# Patient Record
Sex: MALE | Race: BLACK OR AFRICAN AMERICAN | Employment: UNEMPLOYED | URBAN - METROPOLITAN AREA
[De-identification: names, ages, dates, MRNs, and addresses within clinical notes are randomized per-mention and may not be internally consistent; named-entity substitution may affect disease eponyms.]

---

## 2018-06-06 ENCOUNTER — OFFICE VISIT (OUTPATIENT)
Dept: FAMILY MEDICINE CLINIC | Age: 38
End: 2018-06-06

## 2018-06-06 VITALS
WEIGHT: 226.4 LBS | SYSTOLIC BLOOD PRESSURE: 120 MMHG | HEART RATE: 90 BPM | OXYGEN SATURATION: 95 % | TEMPERATURE: 98.5 F | RESPIRATION RATE: 12 BRPM | HEIGHT: 69 IN | DIASTOLIC BLOOD PRESSURE: 90 MMHG | BODY MASS INDEX: 33.53 KG/M2

## 2018-06-06 DIAGNOSIS — R81 GLYCOSURIA: ICD-10-CM

## 2018-06-06 DIAGNOSIS — H92.01 RIGHT EAR PAIN: ICD-10-CM

## 2018-06-06 DIAGNOSIS — R03.0 ELEVATED BLOOD PRESSURE READING: Primary | ICD-10-CM

## 2018-06-06 RX ORDER — METFORMIN HYDROCHLORIDE 1000 MG/1
TABLET ORAL
Refills: 0 | COMMUNITY
Start: 2018-05-19 | End: 2018-06-25 | Stop reason: SINTOL

## 2018-06-06 NOTE — MR AVS SNAPSHOT
[address] 
[phone] Patient: Francisca Rey MRN: [id number] OKS:[id number]/[id number] Visit Information Date & Time Provider Department Dept. Phone Encounter #  
 6/6/2018  1:30 PM Anju Bahena, [address] [place] [address] [id number] Follow-up Instructions Return in about 1 week (around 6/13/2018) for Result review. Upcoming Health Maintenance Date Due DTaP/Tdap/Td series (1 - Tdap) 8/23/2000 Influenza Age 5 to Adult 8/1/2018 Allergies as of 6/6/2018  Review Complete On: 6/6/2018 By: CRUZ West No Known Allergies Current Immunizations  Never Reviewed No immunizations on file. Not reviewed this visit You Were Diagnosed With   
  
 Codes Comments Elevated blood pressure reading    -  Primary ICD-10-CM: R03.0 ICD-9-CM: 796.2 Glycosuria     ICD-10-CM: R81 
ICD-9-CM: 791.5 Right ear pain     ICD-10-CM: H92.01 
ICD-9-CM: 388.70 BMI 33.0-33.9,adult     ICD-10-CM: Y23.69 
ICD-9-CM: V85.33 Vitals BP Pulse Temp Resp Height(growth percentile) Weight(growth percentile) 120/90 (BP 1 Location: Right arm, BP Patient Position: Sitting) 90 98.5 °F (36.9 °C) (Oral) 12 5' 9\" (1.753 m) 226 lb 6.4 oz (102.7 kg) SpO2 BMI Smoking Status 95% 33.43 kg/m2 Never Smoker Vitals History BMI and BSA Data Body Mass Index Body Surface Area  
 33.43 kg/m 2 2.24 m 2 Preferred Pharmacy Pharmacy Name Phone [address], [address] [place] [address] [phone] Your Updated Medication List  
  
   
This list is accurate as of 6/6/18  2:27 PM.  Always use your most recent med list.  
  
  
  
  
 metFORMIN 1,000 mg tablet Commonly known as:  GLUCOPHAGE  
TAKE 1 TABLET BY MOUTH 2 TIMES A DAY Follow-up Instructions Return in about 1 week (around 6/13/2018) for Result review. To-Do List   
 06/06/2018 Lab:  HEMOGLOBIN A1C W/O EAG   
  
 06/06/2018 Lab:  METABOLIC PANEL, COMPREHENSIVE Around 06/07/2018 Lab:  LIPID PANEL Patient Instructions A Healthy Lifestyle: Care Instructions Your Care Instructions A healthy lifestyle can help you feel good, stay at a healthy weight, and have plenty of energy for both work and play. A healthy lifestyle is something you can share with your whole family. A healthy lifestyle also can lower your risk for serious health problems, such as high blood pressure, heart disease, and diabetes. You can follow a few steps listed below to improve your health and the health of your family. Follow-up care is a key part of your treatment and safety. Be sure to make and go to all appointments, and call your doctor if you are having problems. It's also a good idea to know your test results and keep a list of the medicines you take. How can you care for yourself at home? · Do not eat too much sugar, fat, or fast foods. You can still have dessert and treats now and then. The goal is moderation. · Start small to improve your eating habits. Pay attention to portion sizes, drink less juice and soda pop, and eat more fruits and vegetables. ¨ Eat a healthy amount of food. A 3-ounce serving of meat, for example, is about the size of a deck of cards. Fill the rest of your plate with vegetables and whole grains. ¨ Limit the amount of soda and sports drinks you have every day. Drink more water when you are thirsty. ¨ Eat at least 5 servings of fruits and vegetables every day. It may seem like a lot, but it is not hard to reach this goal. A serving or helping is 1 piece of fruit, 1 cup of vegetables, or 2 cups of leafy, raw vegetables. Have an apple or some carrot sticks as an afternoon snack instead of a candy bar.  Try to have fruits and/or vegetables at every meal. 
 · Make exercise part of your daily routine. You may want to start with simple activities, such as walking, bicycling, or slow swimming. Try to be active 30 to 60 minutes every day. You do not need to do all 30 to 60 minutes all at once. For example, you can exercise 3 times a day for 10 or 20 minutes. Moderate exercise is safe for most people, but it is always a good idea to talk to your doctor before starting an exercise program. 
· Keep moving. Normarycaremnn Feeler the lawn, work in the garden, or [facility]. Take the stairs instead of the elevator at work. · If you smoke, quit. People who smoke have an increased risk for heart attack, stroke, cancer, and other lung illnesses. Quitting is hard, but there are ways to boost your chance of quitting tobacco for good. ¨ Use nicotine gum, patches, or lozenges. ¨ Ask your doctor about stop-smoking programs and medicines. ¨ Keep trying. In addition to reducing your risk of diseases in the future, you will notice some benefits soon after you stop using tobacco. If you have shortness of breath or asthma symptoms, they will likely get better within a few weeks after you quit. · Limit how much alcohol you drink. Moderate amounts of alcohol (up to 2 drinks a day for men, 1 drink a day for women) are okay. But drinking too much can lead to liver problems, high blood pressure, and other health problems. Family health If you have a family, there are many things you can do together to improve your health. · Eat meals together as a family as often as possible. · Eat healthy foods. This includes fruits, vegetables, lean meats and dairy, and whole grains. · Include your family in your fitness plan. Most people think of activities such as jogging or tennis as the way to fitness, but there are many ways you and your family can be more active. Anything that makes you breathe hard and gets your heart pumping is exercise. Here are some tips: ¨ Walk to do errands or to take your child to school or the bus. ¨ Go for a family bike ride after dinner instead of watching TV. Where can you learn more? Go to http://chelsie-karin.info/. Enter N681 in the search box to learn more about \"A Healthy Lifestyle: Care Instructions. \" Current as of: May 12, 2017 Content Version: 11.4 © 9592-9521 Searchdaim[employer]. Care instructions adapted under license by "[employer] (which disclaims liability or warranty for this information). If you have questions about a medical condition or this instruction, always ask your healthcare professional. [address] any warranty or liability for your use of this information. Introducing [facility]! Ana Lilia Cunha introduces [employer] patient portal. Now you can access parts of your medical record, email your doctor's office, and request medication refills online. 1. In your internet browser, go to [email or web address] 2. Click on the First Time User? Click Here link in the Sign In box. You will see the New Member Sign Up page. 3. Enter your [employer] Access Code exactly as it appears below. You will not need to use this code after youve completed the sign-up process. If you do not sign up before the expiration date, you must request a new code. · [employer] Access Code: [email or web address] Expires: 9/4/2018  2:27 PM 
 
4. Enter the last four digits of your Social Security Number (xxxx) and Date of Birth (mm/dd/yyyy) as indicated and click Submit. You will be taken to the next sign-up page. 5. Create a [employer] ID. This will be your [employer] login ID and cannot be changed, so think of one that is secure and easy to remember. 6. Create a [employer] password. You can change your password at any time. 7. Enter your Password Reset Question and Answer. This can be used at a later time if you forget your password. 8. Enter your e-mail address. You will receive e-mail notification when new information is available in [address] 9. Click Sign Up. You can now view and download portions of your medical record. 10. Click the Download Summary menu link to download a portable copy of your medical information. If you have questions, please visit the Frequently Asked Questions section of the [employer] website. Remember, [employer] is NOT to be used for urgent needs. For medical emergencies, dial 911. Now available from your iPhone and Android! Please provide this summary of care documentation to your next provider. Your primary care clinician is listed as Donald Mc. If you have any questions after today's visit, please call [phone].

## 2018-06-06 NOTE — PATIENT INSTRUCTIONS

## 2018-06-06 NOTE — PROGRESS NOTES
Patient: Gunjan Amanda MRN: [id number]  SSN: [id number]    YOB: 1980  Age: 44 y.o. Sex: male      Date of Service: 6/6/2018   Provider: CRUZ Gambino   Office Location:   [address] Vince le, [address]  Office Phone: [phone]  Office Fax: [phone]        REASON FOR VISIT:   No chief complaint on file. VITALS:   There were no vitals taken for this visit. MEDICATIONS:        ALLERGIES:   Allergies not on file     MEDICAL/SURGICAL HISTORY:  No past medical history on file. No past surgical history on file. FAMILY HISTORY:  No family history on file. SOCIAL HISTORY:  Social History   Substance Use Topics    Smoking status: Not on file    Smokeless tobacco: Not on file    Alcohol use Not on file          HISTORY OF PRESENT ILLNESS:   Gunjan Amanda is a 44 y.o. male who presents to the office to establish care as a new patient. Here today to discuss diagnosis of possible Type 2 Diabetes. Patient reports he has not had routine primary care in several years. He went to a local urgent care a few weeks ago with complaints of right ear pain, and was found to have an ear infection. Somehow in the course of this visit, he had a urine test, which he states tested positive for diabetes. He was started on metformin 500 mg BID and then increased to 1,000 mg BID. He states the medication has been causing diarrhea. He has started checking his blood glucose at home. It seems to generally be in the 60s fasting, and around 120 after he eats. He stopped the metformin because he thought these glucose readings were low. He is unsure exactly how to proceed. Patient does have some risk factors for diabetes, including his weight and a family history in his father.      REVIEW OF SYSTEMS:  ROS (sees scanned H&P form for complete negative 12 point ROS)     PHYSICAL EXAMINATION:  Physical Exam   Constitutional: He is oriented to person, place, and time and well-developed, well-nourished, and in no distress. HENT:   Head: Normocephalic and atraumatic. Right Ear: Tympanic membrane normal.   Left Ear: Tympanic membrane normal.   Nose: Nose normal.   Mouth/Throat: Uvula is midline, oropharynx is clear and moist and mucous membranes are normal.   Eyes: Conjunctivae are normal. Pupils are equal, round, and reactive to light. Right eye exhibits no discharge. Left eye exhibits no discharge. Neck: Neck supple. No thyromegaly present. Cardiovascular: Normal rate, regular rhythm and normal heart sounds. Exam reveals no gallop and no friction rub. No murmur heard. Pulmonary/Chest: Effort normal and breath sounds normal. He has no wheezes. He has no rales. Lymphadenopathy:     He has no cervical adenopathy. Neurological: He is alert and oriented to person, place, and time. Gait normal.   Skin: Skin is warm and dry. No rash noted. Psychiatric: Mood, memory and affect normal.        RESULTS:  No results found for this visit on 06/06/18. ASSESSMENT/PLAN:  Diagnoses and all orders for this visit:    1. Elevated blood pressure reading  - BP is mildly elevated today, and from the sounds of it, was elevated at recent urgent care visits.   - Likely we are looking at a new diagnosis of mild hypertension, but I'd like to obtain the urgent care records and have him get fasting labs done before discussing medication  Orders:    -     METABOLIC PANEL, COMPREHENSIVE; Future  -     LIPID PANEL; Future    2. Glycosuria   - Based on what patient is telling me, he was given a presumptive diagnosis of diabetes based on the results of a urinalysis done at urgent care. - His reported home glucose readings do not sound particularly consistent with this diagnosis. - I explained that I would like to obtain the test results from the urgent care and have him complete fasting labs as below.    - He will return once labs are done for reassessment. May hold metformin until then  Orders:    -     METABOLIC PANEL, COMPREHENSIVE; Future  -     HEMOGLOBIN A1C W/O EAG; Future    3. Right ear pain  - Resolved. 4. BMI 33.0-33.9,adult  - Healthy lifestyle handout included in AVS   - Check labs  Orders:    -     LIPID PANEL; Future  -     HEMOGLOBIN A1C W/O EAG; Future        Follow-up Disposition:  Return in about 1 week (around 6/13/2018) for Result review.        PATIENT CARE TEAM:   No care team member to display       CRUZ Guzmán   June 6, 2018    2:44 PM

## 2018-06-06 NOTE — PROGRESS NOTES
Chief Complaint   Patient presents with    Establish Care    Other     Diabetes Concern    Hypertension     Visit Vitals    /90 (BP 1 Location: Right arm, BP Patient Position: Sitting)    Pulse 90    Temp 98.5 °F (36.9 °C) (Oral)    Resp 12    Ht 5' 9\" (1.753 m)    Wt 226 lb 6.4 oz (102.7 kg)    SpO2 95%    BMI 33.43 kg/m2       Patient is not fasting. Patient in room # 6.     1. Have you been to the ER, urgent care clinic since your last visit? Hospitalized since your last visit? Yes When: 05/29/2018 [facility] [place] [facility] [address] [facility] Reason for visit: Ear pain    2. Have you seen or consulted any other health care providers outside of the [facility] since your last visit? Include any pap smears or colon screening. Yes When: 05/2018 Where: ENT, [address] [facility], [place], [address] [facility] Reason for visit: Ear pain follow-up    HM Reviewed. Flowsheet, PHQ and Learning needs completed.     Upcoming Appt. 06/26/2018, ENT on [address], [address]

## 2018-06-08 ENCOUNTER — HOSPITAL ENCOUNTER (OUTPATIENT)
Dept: LAB | Age: 38
Discharge: HOME | End: 2018-06-08
Payer: COMMERCIAL

## 2018-06-08 DIAGNOSIS — R81 GLYCOSURIA: ICD-10-CM

## 2018-06-08 DIAGNOSIS — R03.0 ELEVATED BLOOD PRESSURE READING: ICD-10-CM

## 2018-06-08 LAB
ALBUMIN SERPL-MCNC: 4 G/DL (ref 3.4–5)
ALBUMIN/GLOB SERPL: 1.2 {RATIO} (ref 0.8–1.7)
ALP SERPL-CCNC: 78 U/L (ref 45–117)
ALT SERPL-CCNC: 42 U/L (ref 16–61)
ANION GAP SERPL CALC-SCNC: 5 MMOL/L (ref 3–18)
AST SERPL-CCNC: 13 U/L (ref 15–37)
BILIRUB SERPL-MCNC: 0.3 MG/DL (ref 0.2–1)
BUN SERPL-MCNC: 15 MG/DL (ref 7–18)
BUN/CREAT SERPL: 13 (ref 12–20)
CALCIUM SERPL-MCNC: 9.2 MG/DL (ref 8.5–10.1)
CHLORIDE SERPL-SCNC: 104 MMOL/L (ref 100–108)
CHOLEST SERPL-MCNC: 257 MG/DL
CO2 SERPL-SCNC: 30 MMOL/L (ref 21–32)
CREAT SERPL-MCNC: 1.2 MG/DL (ref 0.6–1.3)
GLOBULIN SER CALC-MCNC: 3.4 G/DL (ref 2–4)
GLUCOSE SERPL-MCNC: 214 MG/DL (ref 74–99)
HBA1C MFR BLD: 7.6 % (ref 4.2–5.6)
HDLC SERPL-MCNC: 32 MG/DL (ref 40–60)
HDLC SERPL: 8 {RATIO} (ref 0–5)
LDLC SERPL CALC-MCNC: 175 MG/DL (ref 0–100)
LIPID PROFILE,FLP: ABNORMAL
POTASSIUM SERPL-SCNC: 4.2 MMOL/L (ref 3.5–5.5)
PROT SERPL-MCNC: 7.4 G/DL (ref 6.4–8.2)
SODIUM SERPL-SCNC: 139 MMOL/L (ref 136–145)
TRIGL SERPL-MCNC: 250 MG/DL (ref ?–150)
VLDLC SERPL CALC-MCNC: 50 MG/DL

## 2018-06-08 PROCEDURE — 80053 COMPREHEN METABOLIC PANEL: CPT | Performed by: PHYSICIAN ASSISTANT

## 2018-06-08 PROCEDURE — 83036 HEMOGLOBIN GLYCOSYLATED A1C: CPT | Performed by: PHYSICIAN ASSISTANT

## 2018-06-08 PROCEDURE — 80061 LIPID PANEL: CPT | Performed by: PHYSICIAN ASSISTANT

## 2018-06-08 PROCEDURE — 36415 COLL VENOUS BLD VENIPUNCTURE: CPT | Performed by: PHYSICIAN ASSISTANT

## 2018-06-14 ENCOUNTER — OFFICE VISIT (OUTPATIENT)
Dept: FAMILY MEDICINE CLINIC | Age: 38
End: 2018-06-14

## 2018-06-14 VITALS
DIASTOLIC BLOOD PRESSURE: 78 MMHG | HEART RATE: 96 BPM | WEIGHT: 222.4 LBS | RESPIRATION RATE: 12 BRPM | HEIGHT: 69 IN | TEMPERATURE: 99.2 F | BODY MASS INDEX: 32.94 KG/M2 | SYSTOLIC BLOOD PRESSURE: 102 MMHG | OXYGEN SATURATION: 95 %

## 2018-06-14 DIAGNOSIS — E78.2 MIXED HYPERLIPIDEMIA: ICD-10-CM

## 2018-06-14 DIAGNOSIS — E11.9 TYPE 2 DIABETES MELLITUS WITHOUT COMPLICATION, WITHOUT LONG-TERM CURRENT USE OF INSULIN (HCC): Primary | ICD-10-CM

## 2018-06-14 RX ORDER — INSULIN PUMP SYRINGE, 3 ML
EACH MISCELLANEOUS
Qty: 1 KIT | Refills: 0 | Status: SHIPPED | OUTPATIENT
Start: 2018-06-14

## 2018-06-14 RX ORDER — KETOCONAZOLE 20 MG/G
CREAM TOPICAL
COMMUNITY
Start: 2018-03-19 | End: 2018-07-23

## 2018-06-14 RX ORDER — LANCETS
EACH MISCELLANEOUS
Qty: 1 EACH | Refills: 11 | Status: SHIPPED | OUTPATIENT
Start: 2018-06-14

## 2018-06-14 RX ORDER — KETOCONAZOLE 20 MG/ML
SHAMPOO TOPICAL
COMMUNITY
Start: 2018-03-19 | End: 2018-07-02 | Stop reason: SDUPTHER

## 2018-06-14 RX ORDER — SIMVASTATIN 10 MG/1
10 TABLET, FILM COATED ORAL
Qty: 30 TAB | Refills: 0 | Status: SHIPPED | OUTPATIENT
Start: 2018-06-14 | End: 2018-11-05 | Stop reason: SDUPTHER

## 2018-06-14 NOTE — MR AVS SNAPSHOT
[address] [place] [address] [address] [facility] 
[phone] Patient: Alem Nolen MRN: [id number] BWM:[id number]/60/[id number] Visit Information Date & Time Provider Department Dept. Phone Encounter #  
 6/14/2018  2:30 PM Billie Shore, [address] [place] [address] [id number] Follow-up Instructions Return in about 1 month (around 7/14/2018) for DM. Upcoming Health Maintenance Date Due DTaP/Tdap/Td series (1 - Tdap) 8/23/2000 Influenza Age 5 to Adult 8/1/2018 Allergies as of 6/14/2018  Review Complete On: 6/14/2018 By: Emily Tomas LPN No Known Allergies Current Immunizations  Never Reviewed No immunizations on file. Not reviewed this visit You Were Diagnosed With   
  
 Codes Comments Type 2 diabetes mellitus without complication, without long-term current use of insulin (HCC)    -  Primary ICD-10-CM: E11.9 ICD-9-CM: 250.00 Mixed hyperlipidemia     ICD-10-CM: E78.2 ICD-9-CM: 272.2 BMI 32.0-32.9,adult     ICD-10-CM: T98.99 
ICD-9-CM: V85.32 Vitals BP Pulse Temp Resp Height(growth percentile) Weight(growth percentile) 102/78 (BP 1 Location: Right arm, BP Patient Position: Sitting) 96 99.2 °F (37.3 °C) (Oral) 12 5' 9\" (1.753 m) 222 lb 6.4 oz (100.9 kg) SpO2 BMI Smoking Status 95% 32.84 kg/m2 Never Smoker Vitals History BMI and BSA Data Body Mass Index Body Surface Area  
 32.84 kg/m 2 2.22 m 2 Preferred Pharmacy Pharmacy Name Phone [address], [address] [place] [address] [phone] Your Updated Medication List  
  
   
This list is accurate as of 6/14/18  3:18 PM.  Always use your most recent med list.  
  
  
  
  
 Blood-Glucose Meter monitoring kit Check blood glucose once daily in the morning Dx: E11.9  
  
 glucose blood VI test strips strip Commonly known as:  blood glucose test  
Check blood glucose once daily in the morning Dx: E11.9  
  
 * ketoconazole 2 % shampoo Commonly known as:  NIZORAL * ketoconazole 2 % topical cream  
Commonly known as:  NIZORAL Lancets Misc Check blood glucose once daily in the morning Dx: E11.9  
  
 metFORMIN 1,000 mg tablet Commonly known as:  GLUCOPHAGE  
TAKE 1 TABLET BY MOUTH 2 TIMES A DAY  
  
 simvastatin 10 mg tablet Commonly known as:  ZOCOR Take 1 Tab by mouth nightly. * Notice: This list has 2 medication(s) that are the same as other medications prescribed for you. Read the directions carefully, and ask your doctor or other care provider to review them with you. Prescriptions Printed Refills Blood-Glucose Meter monitoring kit 0 Sig: Check blood glucose once daily in the morning Dx: E11.9 Class: Print Lancets misc 11 Sig: Check blood glucose once daily in the morning Dx: E11.9 Class: Print  
 glucose blood VI test strips (BLOOD GLUCOSE TEST) strip 11 Sig: Check blood glucose once daily in the morning Dx: E11.9 Class: Print Prescriptions Sent to Pharmacy Refills  
 simvastatin (ZOCOR) 10 mg tablet 0 Sig: Take 1 Tab by mouth nightly. Class: Normal  
 Pharmacy: [id number], [address] [facility] #: [phone] Route: Oral  
  
Follow-up Instructions Return in about 1 month (around 7/14/2018) for DM. Patient Instructions Learning About Diabetes Food Guidelines Your Care Instructions Meal planning is important to manage diabetes. It helps keep your blood sugar at a target level (which you set with your doctor). You don't have to eat special foods. You can eat what your family eats, including sweets once in a while. But you do have to pay attention to how often you eat and how much you eat of certain foods. You may want to work with a dietitian or a certified diabetes educator (CDE) to help you plan meals and snacks. A dietitian or CDE can also help you lose weight if that is one of your goals. What should you know about eating carbs? Managing the amount of carbohydrate (carbs) you eat is an important part of healthy meals when you have diabetes. Carbohydrate is found in many foods. · Learn which foods have carbs. And learn the amounts of carbs in different foods. ¨ Bread, cereal, pasta, and rice have about 15 grams of carbs in a serving. A serving is 1 slice of bread (1 ounce), ½ cup of cooked cereal, or 1/3 cup of cooked pasta or rice. ¨ Fruits have 15 grams of carbs in a serving. A serving is 1 small fresh fruit, such as an apple or orange; ½ of a banana; ½ cup of cooked or canned fruit; ½ cup of fruit juice; 1 cup of melon or raspberries; or 2 tablespoons of dried fruit. ¨ Milk and no-sugar-added yogurt have 15 grams of carbs in a serving. A serving is 1 cup of milk or 2/3 cup of no-sugar-added yogurt. ¨ Starchy vegetables have 15 grams of carbs in a serving. A serving is ½ cup of mashed potatoes or sweet potato; 1 cup winter squash; ½ of a small baked potato; ½ cup of cooked beans; or ½ cup cooked corn or green peas. · Learn how much carbs to eat each day and at each meal. A dietitian or CDE can teach you how to keep track of the amount of carbs you eat. This is called carbohydrate counting. · If you are not sure how to count carbohydrate grams, use the Plate Method to plan meals. It is a good, quick way to make sure that you have a balanced meal. It also helps you spread carbs throughout the day. ¨ Divide your plate by types of foods. Put non-starchy vegetables on half the plate, meat or other protein food on one-quarter of the plate, and a grain or starchy vegetable in the final quarter of the plate.  To this you can add a small piece of fruit and 1 cup of milk or yogurt, depending on how many carbs you are supposed to eat at a meal. 
· Try to eat about the same amount of carbs at each meal. Do not \"save up\" your daily allowance of carbs to eat at one meal. 
· Proteins have very little or no carbs per serving. Examples of proteins are beef, chicken, turkey, fish, eggs, tofu, cheese, cottage cheese, and peanut butter. A serving size of meat is 3 ounces, which is about the size of a deck of cards. Examples of meat substitute serving sizes (equal to 1 ounce of meat) are 1/4 cup of cottage cheese, 1 egg, 1 tablespoon of peanut butter, and ½ cup of tofu. How can you eat out and still eat healthy? · Learn to estimate the serving sizes of foods that have carbohydrate. If you measure food at home, it will be easier to estimate the amount in a serving of restaurant food. · If the meal you order has too much carbohydrate (such as potatoes, corn, or baked beans), ask to have a low-carbohydrate food instead. Ask for a salad or green vegetables. · If you use insulin, check your blood sugar before and after eating out to help you plan how much to eat in the future. · If you eat more carbohydrate at a meal than you had planned, take a walk or do other exercise. This will help lower your blood sugar. What else should you know? · Limit saturated fat, such as the fat from meat and dairy products. This is a healthy choice because people who have diabetes are at higher risk of heart disease. So choose lean cuts of meat and nonfat or low-fat dairy products. Use olive or canola oil instead of butter or shortening when cooking. · Don't skip meals. Your blood sugar may drop too low if you skip meals and take insulin or certain medicines for diabetes. · Check with your doctor before you drink alcohol. Alcohol can cause your blood sugar to drop too low. Alcohol can also cause a bad reaction if you take certain diabetes medicines. Follow-up care is a key part of your treatment and safety.  Be sure to make and go to all appointments, and call your doctor if you are having problems. It's also a good idea to know your test results and keep a list of the medicines you take. Where can you learn more? Go to http://chelsie-karin.info/. Enter L925 in the search box to learn more about \"Learning About Diabetes Food Guidelines. \" Current as of: March 13, 2017 Content Version: 11.4 © 3733-4937 Wormhole. Care instructions adapted under license by [employer] (which disclaims liability or warranty for this information). If you have questions about a medical condition or this instruction, always ask your healthcare professional. [address] any warranty or liability for your use of this information. Introducing hospitals & HEALTH SERVICES! Monique Delgado introduces [employer] patient portal. Now you can access parts of your medical record, email your doctor's office, and request medication refills online. 1. In your internet browser, go to [email or web address] 2. Click on the First Time User? Click Here link in the Sign In box. You will see the New Member Sign Up page. 3. Enter your [employer] Access Code exactly as it appears below. You will not need to use this code after youve completed the sign-up process. If you do not sign up before the expiration date, you must request a new code. · [employer] Access Code: [email or web address] Expires: 9/4/2018  2:27 PM 
 
4. Enter the last four digits of your Social Security Number (xxxx) and Date of Birth (mm/dd/yyyy) as indicated and click Submit. You will be taken to the next sign-up page. 5. Create a [employer] ID. This will be your [employer] login ID and cannot be changed, so think of one that is secure and easy to remember. 6. Create a [employer] password. You can change your password at any time. 7. Enter your Password Reset Question and Answer.  This can be used at a later time if you forget your password. 8. Enter your e-mail address. You will receive e-mail notification when new information is available in [address] 9. Click Sign Up. You can now view and download portions of your medical record. 10. Click the Download Summary menu link to download a portable copy of your medical information. If you have questions, please visit the Frequently Asked Questions section of the [employer] website. Remember, [employer] is NOT to be used for urgent needs. For medical emergencies, dial 911. Now available from your iPhone and Android! Please provide this summary of care documentation to your next provider. Your primary care clinician is listed as Roxana Barbosa. If you have any questions after today's visit, please call [phone].

## 2018-06-14 NOTE — PROGRESS NOTES
Patient: Amanda Calle MRN: [id number]  SSN: [id number]    YOB: 1980  Age: 44 y.o. Sex: male      Date of Service: 6/14/2018   Provider: CRUZ Hoover   Office Location:   [address] [place], [address] [place] [address] Dr Vince le, [address]  Office Phone: [phone]  Office Fax: [phone]      REASON FOR VISIT:   Chief Complaint   Patient presents with    Elevated Blood Pressure     follow-up    Results    Leg Pain     3-4 days, left leg        VITALS:   Visit Vitals    /78 (BP 1 Location: Right arm, BP Patient Position: Sitting)  Comment: manual    Pulse 96    Temp 99.2 °F (37.3 °C) (Oral)    Resp 12    Ht 5' 9\" (1.753 m)    Wt 222 lb 6.4 oz (100.9 kg)    SpO2 95%    BMI 32.84 kg/m2        MEDICATIONS:   Current Outpatient Prescriptions on File Prior to Visit   Medication Sig Dispense Refill    metFORMIN (GLUCOPHAGE) 1,000 mg tablet TAKE 1 TABLET BY MOUTH 2 TIMES A DAY  0     No current facility-administered medications on file prior to visit. ALLERGIES:   No Known Allergies     MEDICAL/SURGICAL HISTORY:  No past medical history on file. No past surgical history on file. FAMILY HISTORY:  Family History   Problem Relation Age of Onset    Hypertension Mother     High Cholesterol Mother     Diabetes Father     Hypertension Father     High Cholesterol Father     Cancer Mother         SOCIAL HISTORY:  Social History   Substance Use Topics    Smoking status: Never Smoker    Smokeless tobacco: Never Used    Alcohol use 1.8 oz/week     3 Cans of beer per week      Comment: rarely             HISTORY OF PRESENT ILLNESS: Amanda Calle is a 44 y.o. male who presents to the office for a routine follow up visit. Patient is here today to review recent labs. He had been given a diagnosis of Type 2 diabetes by a local urgent care, but we had been skeptical of this as he stated he never had any blood tests, only urine studies.  At his visit with me last week, a fasting metabolic & lipid panel as well as A1c were ordered. A1c was found to be 7.6%, confirming new diagnosis of Type 2 DM. I have since [facility], and it appears patient had an A1c done there as well, which was 7.2%     He is here today to discuss these results. Upon review of his diet, he admits he is a very unhealthy eater. Usually will have a sausage egg & cheese on a biscuit for breakfast, a chicken breast for lunch, and a meat lovers pizza for dinner. He does not really care for vegetables. He also drinks mostly soda. REVIEW OF SYSTEMS:  Review of Systems   Constitutional: Negative for chills, fever and malaise/fatigue. Respiratory: Negative for cough, shortness of breath and wheezing. Cardiovascular: Negative for chest pain, palpitations and leg swelling. Gastrointestinal: Negative for abdominal pain, nausea and vomiting. PHYSICAL EXAMINATION:  Physical Exam   Constitutional: He is oriented to person, place, and time and well-developed, well-nourished, and in no distress. Cardiovascular: Normal rate, regular rhythm and normal heart sounds. Exam reveals no gallop and no friction rub. No murmur heard. Pulmonary/Chest: Effort normal and breath sounds normal. He has no wheezes. He has no rales. Neurological: He is alert and oriented to person, place, and time. Gait normal.   Skin: Skin is warm and dry. No rash noted.    Psychiatric: Mood, memory and affect normal.        RESULTS:  Lab Results   Component Value Date/Time    Sodium 139 06/08/2018 10:54 AM    Potassium 4.2 06/08/2018 10:54 AM    Chloride 104 06/08/2018 10:54 AM    CO2 30 06/08/2018 10:54 AM    Anion gap 5 06/08/2018 10:54 AM    Glucose 214 (H) 06/08/2018 10:54 AM    BUN 15 06/08/2018 10:54 AM    Creatinine 1.20 06/08/2018 10:54 AM    BUN/Creatinine ratio 13 06/08/2018 10:54 AM    GFR est AA >60 06/08/2018 10:54 AM    GFR est non-AA >60 06/08/2018 10:54 AM    Calcium 9.2 06/08/2018 10:54 AM    Bilirubin, total 0.3 06/08/2018 10:54 AM    AST (SGOT) 13 (L) 06/08/2018 10:54 AM    Alk. phosphatase 78 06/08/2018 10:54 AM    Protein, total 7.4 06/08/2018 10:54 AM    Albumin 4.0 06/08/2018 10:54 AM    Globulin 3.4 06/08/2018 10:54 AM    A-G Ratio 1.2 06/08/2018 10:54 AM    ALT (SGPT) 42 06/08/2018 10:54 AM      Lab Results   Component Value Date/Time    Cholesterol, total 257 (H) 06/08/2018 10:54 AM    HDL Cholesterol 32 (L) 06/08/2018 10:54 AM    LDL, calculated 175 (H) 06/08/2018 10:54 AM    VLDL, calculated 50 06/08/2018 10:54 AM    Triglyceride 250 (H) 06/08/2018 10:54 AM    CHOL/HDL Ratio 8.0 (H) 06/08/2018 10:54 AM      Lab Results   Component Value Date/Time    Hemoglobin A1c 7.6 (H) 06/08/2018 10:54 AM        ASSESSMENT/PLAN:  Diagnoses and all orders for this visit:    1. Type 2 diabetes mellitus without complication, without long-term current use of insulin ([address]  - Newly diagnosed type 2 DM  - Reassured patient that was have caught this early and I am very optimistic that with medications and some lifestyle changes, he will be able to get his A1c to goal.  -He has a LOT of opportunity to improve his diet. Encouraged him to start by cutting out soda, and trying to eat at least one serving of vegetables daily.   - He will resume metformin, 500 mg daily with his largest meal, and after 1-2 weeks, he will increase back to BID if tolerated  - Start checking AM fasting glucose daily  Orders:    -     Blood-Glucose Meter monitoring kit; Check blood glucose once daily in the morning Dx: E11.9  -     Lancets misc; Check blood glucose once daily in the morning Dx: E11.9  -     glucose blood VI test strips (BLOOD GLUCOSE TEST) strip; Check blood glucose once daily in the morning Dx: E11.9    2. Mixed hyperlipidemia  - Discussed need for low dose statin in the setting of co morbid DM. Rx sent to pharmacy  Orders:    -     simvastatin (ZOCOR) 10 mg tablet;  Take 1 Tab by mouth nightly. 3. BMI 32.0-32.9,adult  - Counseled extensively on diet and exercise   - Handout given on diabetic diet      Follow-up Disposition:  Return in about 1 month (around 7/14/2018) for DM. All questions answered. Patient expresses understanding and agrees with the plan as detailed above.     PATIENT CARE TEAM:   Patient Care Team:  CRUZ Rene as PCP - General (Physician Assistant)  None       Brenda Renema   June 14, 2018    3:30 PM

## 2018-06-14 NOTE — PROGRESS NOTES
Chief Complaint   Patient presents with    Elevated Blood Pressure     follow-up    Results    Leg Pain     3-4 days, left leg     Visit Vitals    /78 (BP 1 Location: Right arm, BP Patient Position: Sitting)    Pulse 96    Temp 99.2 °F (37.3 °C) (Oral)    Resp 12    Ht 5' 9\" (1.753 m)    Wt 222 lb 6.4 oz (100.9 kg)    SpO2 95%    BMI 32.84 kg/m2     Patient is not fasting. Patient in room # 6.     1. Have you been to the ER, urgent care clinic since your last visit? Hospitalized since your last visit? No    2. Have you seen or consulted any other health care providers outside of the [address] [place] [address] since your last visit? Include any pap smears or colon screening. No     Reviewed.

## 2018-06-14 NOTE — PATIENT INSTRUCTIONS

## 2018-06-20 ENCOUNTER — HOSPITAL ENCOUNTER (OUTPATIENT)
Dept: GENERAL RADIOLOGY | Age: 38
Discharge: HOME | End: 2018-06-20
Payer: COMMERCIAL

## 2018-06-20 ENCOUNTER — OFFICE VISIT (OUTPATIENT)
Dept: FAMILY MEDICINE CLINIC | Age: 38
End: 2018-06-20

## 2018-06-20 VITALS
OXYGEN SATURATION: 96 % | DIASTOLIC BLOOD PRESSURE: 86 MMHG | SYSTOLIC BLOOD PRESSURE: 112 MMHG | BODY MASS INDEX: 33 KG/M2 | WEIGHT: 222.8 LBS | RESPIRATION RATE: 12 BRPM | TEMPERATURE: 99.5 F | HEART RATE: 104 BPM | HEIGHT: 69 IN

## 2018-06-20 DIAGNOSIS — M79.605 LEFT LEG PAIN: ICD-10-CM

## 2018-06-20 DIAGNOSIS — M79.605 LEFT LEG PAIN: Primary | ICD-10-CM

## 2018-06-20 PROCEDURE — 73502 X-RAY EXAM HIP UNI 2-3 VIEWS: CPT

## 2018-06-20 RX ORDER — IBUPROFEN 800 MG/1
800 TABLET ORAL
Qty: 60 TAB | Refills: 0 | Status: SHIPPED | OUTPATIENT
Start: 2018-06-20 | End: 2018-07-18 | Stop reason: SDUPTHER

## 2018-06-20 NOTE — PATIENT INSTRUCTIONS

## 2018-06-20 NOTE — PROGRESS NOTES
Patient: Jenny Bonilla MRN: [id number]  SSN: [id number]    YOB: 1980  Age: 44 y.o. Sex: male      Date of Service: 6/20/2018   Provider: CRUZ Live   Office Location:   [address] [place] [address], [address]  Office Phone: [phone]  Office Fax: [phone]        REASON FOR VISIT:   Chief Complaint   Patient presents with    Leg Pain     left leg, 2 weeks        VITALS:   Visit Vitals    /86 (BP 1 Location: Right arm, BP Patient Position: Sitting)  Comment: manual    Pulse (!) 104  Comment: auscultation    Temp 99.5 °F (37.5 °C) (Oral)    Resp 12    Ht 5' 9\" (1.753 m)    Wt 222 lb 12.8 oz (101.1 kg)    SpO2 96%    BMI 32.9 kg/m2       MEDICATIONS:   Current Outpatient Prescriptions   Medication Sig Dispense Refill    ketoconazole (NIZORAL) 2 % shampoo       ketoconazole (NIZORAL) 2 % topical cream       simvastatin (ZOCOR) 10 mg tablet Take 1 Tab by mouth nightly. 30 Tab 0    metFORMIN (GLUCOPHAGE) 1,000 mg tablet TAKE 1 TABLET BY MOUTH 2 TIMES A DAY  0    Blood-Glucose Meter monitoring kit Check blood glucose once daily in the morning Dx: E11.9 1 Kit 0    Lancets misc Check blood glucose once daily in the morning Dx: E11.9 1 Each 11    glucose blood VI test strips (BLOOD GLUCOSE TEST) strip Check blood glucose once daily in the morning Dx: E11.9 100 Strip 11        ALLERGIES:   No Known Allergies     ACTIVE MEDICAL PROBLEMS:  Patient Active Problem List   Diagnosis Code    Type 2 diabetes mellitus without complication, without long-term current use of insulin ([facility]) E11.9    Mixed hyperlipidemia E78.2          HISTORY OF PRESENT ILLNESS:   Jenny Bonilla is a 44 y.o. male who presents to the office for acute care. Here today with complaints of pain in his left upper thigh/hip area x about 2 weeks. He reports he woke up with the pain one day, and does not recall any specific inciting injury.  Pain is circumferential around his proximal thigh, and is aggravated by walking, weight bearing, externally rotating or abducting his hip. He's been taking his mom's ibuprofen 800 mg with good relief. He denies any numbness, tingling, weakness. Denies any redness, bruising, or swelling to the affected area. No fevers, chills, recent travel/surgery/prolonged immobility. No personal hx of DVT though thinks his dad did have \"blood clots\" at some point. REVIEW OF SYSTEMS:  ROS as noted in HPI    PHYSICAL EXAMINATION:  Physical Exam   Constitutional: He is oriented to person, place, and time and well-developed, well-nourished, and in no distress. Cardiovascular: Normal rate, regular rhythm and normal heart sounds. Exam reveals no gallop and no friction rub. No murmur heard. Pulmonary/Chest: Effort normal and breath sounds normal. He has no wheezes. He has no rales. Musculoskeletal:        Left hip: He exhibits decreased range of motion ( pain with passive abduction and external rotation, full ROM otherwise). He exhibits normal strength, no tenderness, no bony tenderness, no swelling and no deformity. Left knee: He exhibits normal range of motion, no swelling and no deformity. No tenderness found. Neurological: He is alert and oriented to person, place, and time. Gait normal.   Skin: Skin is warm and dry. No rash noted. Psychiatric: Mood, memory and affect normal.        RESULTS:  No results found for this visit on 06/20/18. ASSESSMENT/PLAN:  Diagnoses and all orders for this visit:    1. Left leg pain  - Atraumatic hip/thigh pain  - Suspect muscle strain  - Seems to be responding well to ibuprofen, so will give him his own rx for this. - Obtain x-ray to assess hip joint for arthritis   - Low clinical suspicion for DVT but could check ultrasound if symptoms persist  Orders:    -     ibuprofen (MOTRIN) 800 mg tablet;  Take 1 Tab by mouth every eight (8) hours as needed for Pain.  -     XR HIP LT W OR WO PELV 2-3 VWS; Future        All questions answered. Patient expresses understanding and agrees with the plan as detailed above. Follow-up Disposition:  Return for as scheduled.        PATIENT CARE TEAM:   Patient Care Team:  CRUZ Adrian as PCP - General (Physician Assistant)  None       Juliet Melendrez, [address]   June 20, 2018    1:42 PM

## 2018-06-20 NOTE — PROGRESS NOTES
Chief Complaint   Patient presents with    Leg Pain     left leg, 2 weeks     Vitals:    06/20/18 1303 06/20/18 1308   BP: 112/86  Comment: manual    BP 1 Location: Right arm    BP Patient Position: Sitting    Pulse: (!) 107 (!) 104  Comment: auscultation   Resp: 12    Temp: 99.5 °F (37.5 °C)    TempSrc: Oral    SpO2: 96%    Weight: 222 lb 12.8 oz (101.1 kg)    Height: 5' 9\" (1.753 m)      Patient is not fasting. Patient in room # 5.       1. Have you been to the ER, urgent care clinic since your last visit? Hospitalized since your last visit? No    2. Have you seen or consulted any other health care providers outside of the [address] [place] [address] since your last visit? Include any pap smears or colon screening. No     Reviewed. Flowsheet Fall completed.

## 2018-06-20 NOTE — MR AVS SNAPSHOT
[address] [facility] [address] 
[phone] Patient: Ifeoma Payne MRN: [id number] Visit Information Date & Time Provider Department Dept. Phone Encounter #  
 6/20/2018  1:00 PM Wilmar Self, [address] [id number] Follow-up Instructions Return for as scheduled. Your Appointments 7/16/2018 10:00 AM  
FOLLOW UP EXAM with CRUZ Fishman [address] [facility] ([address]) Appt Note: 1 mth f/u  
 [address] [facility] [address]  
[address] [facility] [address] [facility] [address] Upcoming Health Maintenance Date Due  
 FOOT EXAM Q1 8/23/1989 MICROALBUMIN Q1 8/23/1989 EYE EXAM RETINAL OR DILATED Q1 8/23/1989 Pneumococcal 19-64 Medium Risk (1 of 1 - PPSV23) 8/23/1998 DTaP/Tdap/Td series (1 - Tdap) 8/23/2000 Influenza Age 5 to Adult 8/1/2018 HEMOGLOBIN A1C Q6M 12/8/2018 LIPID PANEL Q1 6/8/2019 Allergies as of 6/20/2018  Review Complete On: 6/20/2018 By: Alverto Quick LPN No Known Allergies Current Immunizations  Never Reviewed No immunizations on file. Not reviewed this visit You Were Diagnosed With   
  
 Codes Comments Left leg pain    -  Primary ICD-10-CM: M79.605 ICD-9-CM: 729.5 Vitals BP Pulse Temp Resp Height(growth percentile) Weight(growth percentile) 112/86 (BP 1 Location: Right arm, BP Patient Position: Sitting) (!) 104 99.5 °F (37.5 °C) (Oral) 12 5' 9\" (1.753 m) 222 lb 12.8 oz (101.1 kg) SpO2 BMI Smoking Status 96% 32.9 kg/m2 Never Smoker Vitals History BMI and BSA Data Body Mass Index Body Surface Area 32.9 kg/m 2 2.22 m 2 Preferred Pharmacy Pharmacy Name Phone  [facility] [phone] Your Updated Medication List  
  
   
This list is accurate as of 6/20/18  1:28 PM.  Always use your most recent med list.  
  
  
  
  
 Blood-Glucose Meter monitoring kit Check blood glucose once daily in the morning Dx: E11.9  
  
 glucose blood VI test strips strip Commonly known as:  blood glucose test  
Check blood glucose once daily in the morning Dx: E11.9  
  
 ibuprofen 800 mg tablet Commonly known as:  MOTRIN Take 1 Tab by mouth every eight (8) hours as needed for Pain. * ketoconazole 2 % shampoo Commonly known as:  NIZORAL * ketoconazole 2 % topical cream  
Commonly known as:  NIZORAL Lancets Misc Check blood glucose once daily in the morning Dx: E11.9  
  
 metFORMIN 1,000 mg tablet Commonly known as:  GLUCOPHAGE  
TAKE 1 TABLET BY MOUTH 2 TIMES A DAY  
  
 simvastatin 10 mg tablet Commonly known as:  ZOCOR Take 1 Tab by mouth nightly. * Notice: This list has 2 medication(s) that are the same as other medications prescribed for you. Read the directions carefully, and ask your doctor or other care provider to review them with you. Prescriptions Sent to Pharmacy Refills  
 ibuprofen (MOTRIN) 800 mg tablet 0 Sig: Take 1 Tab by mouth every eight (8) hours as needed for Pain. Class: Normal  
 Pharmacy: [id number] [place] [address], [address] [facility] #: [phone] Route: Oral  
  
Follow-up Instructions Return for as scheduled. To-Do List   
 06/20/2018 Imaging:  XR HIP LT W OR WO PELV 2-3 VWS Patient Instructions A Healthy Lifestyle: Care Instructions Your Care Instructions A healthy lifestyle can help you feel good, stay at a healthy weight, and have plenty of energy for both work and play. A healthy lifestyle is something you can share with your whole family.  
A healthy lifestyle also can lower your risk for serious health problems, such as high blood pressure, heart disease, and diabetes. You can follow a few steps listed below to improve your health and the health of your family. Follow-up care is a key part of your treatment and safety. Be sure to make and go to all appointments, and call your doctor if you are having problems. It's also a good idea to know your test results and keep a list of the medicines you take. How can you care for yourself at home? · Do not eat too much sugar, fat, or fast foods. You can still have dessert and treats now and then. The goal is moderation. · Start small to improve your eating habits. Pay attention to portion sizes, drink less juice and soda pop, and eat more fruits and vegetables. ¨ Eat a healthy amount of food. A 3-ounce serving of meat, for example, is about the size of a deck of cards. Fill the rest of your plate with vegetables and whole grains. ¨ Limit the amount of soda and sports drinks you have every day. Drink more water when you are thirsty. ¨ Eat at least 5 servings of fruits and vegetables every day. It may seem like a lot, but it is not hard to reach this goal. A serving or helping is 1 piece of fruit, 1 cup of vegetables, or 2 cups of leafy, raw vegetables. Have an apple or some carrot sticks as an afternoon snack instead of a candy bar. Try to have fruits and/or vegetables at every meal. 
· Make exercise part of your daily routine. You may want to start with simple activities, such as walking, bicycling, or slow swimming. Try to be active 30 to 60 minutes every day. You do not need to do all 30 to 60 minutes all at once. For example, you can exercise 3 times a day for 10 or 20 minutes. Moderate exercise is safe for most people, but it is always a good idea to talk to your doctor before starting an exercise program. 
· Keep moving. Red Handsome the lawn, work in the garden, or [place] [employer]. Take the stairs instead of the elevator at work. · If you smoke, quit. People who smoke have an increased risk for heart attack, stroke, cancer, and other lung illnesses. Quitting is hard, but there are ways to boost your chance of quitting tobacco for good. ¨ Use nicotine gum, patches, or lozenges. ¨ Ask your doctor about stop-smoking programs and medicines. ¨ Keep trying. In addition to reducing your risk of diseases in the future, you will notice some benefits soon after you stop using tobacco. If you have shortness of breath or asthma symptoms, they will likely get better within a few weeks after you quit. · Limit how much alcohol you drink. Moderate amounts of alcohol (up to 2 drinks a day for men, 1 drink a day for women) are okay. But drinking too much can lead to liver problems, high blood pressure, and other health problems. Family health If you have a family, there are many things you can do together to improve your health. · Eat meals together as a family as often as possible. · Eat healthy foods. This includes fruits, vegetables, lean meats and dairy, and whole grains. · Include your family in your fitness plan. Most people think of activities such as jogging or tennis as the way to fitness, but there are many ways you and your family can be more active. Anything that makes you breathe hard and gets your heart pumping is exercise. Here are some tips: 
¨ Walk to do errands or to take your child to school or the bus. ¨ Go for a family bike ride after dinner instead of watching TV. Where can you learn more? Go to http://chelsie-karin.info/. Enter S585 in the search box to learn more about \"A Healthy Lifestyle: Care Instructions. \" Current as of: May 12, 2017 Content Version: 11.4 © 1877-3424 [employer]. Care instructions adapted under license by [employer] (which disclaims liability or warranty for this information).  If you have questions about a medical condition or this instruction, always ask your healthcare professional. [place] [address] any warranty or liability for your use of this information. Introducing [facility] SERVICES! Gerson Lee introduces [employer] patient portal. Now you can access parts of your medical record, email your doctor's office, and request medication refills online. 1. In your internet browser, go to [email or web address] 2. Click on the First Time User? Click Here link in the Sign In box. You will see the New Member Sign Up page. 3. Enter your [employer] Access Code exactly as it appears below. You will not need to use this code after youve completed the sign-up process. If you do not sign up before the expiration date, you must request a new code. · [employer] Access Code: [email or web address] Expires: 9/4/2018  2:27 PM 
 
4. Enter the last four digits of your Social Security Number (xxxx) and Date of Birth (mm/dd/yyyy) as indicated and click Submit. You will be taken to the next sign-up page. 5. Create a [employer] ID. This will be your [employer] login ID and cannot be changed, so think of one that is secure and easy to remember. 6. Create a [employer] password. You can change your password at any time. 7. Enter your Password Reset Question and Answer. This can be used at a later time if you forget your password. 8. Enter your e-mail address. You will receive e-mail notification when new information is available in [address] 9. Click Sign Up. You can now view and download portions of your medical record. 10. Click the Download Summary menu link to download a portable copy of your medical information. If you have questions, please visit the Frequently Asked Questions section of the [employer] website. Remember, [employer] is NOT to be used for urgent needs. For medical emergencies, dial 911. Now available from your iPhone and Android! Please provide this summary of care documentation to your next provider. Your primary care clinician is listed as Junito Torres. If you have any questions after today's visit, please call [phone].

## 2018-06-22 ENCOUNTER — TELEPHONE (OUTPATIENT)
Dept: FAMILY MEDICINE CLINIC | Age: 38
End: 2018-06-22

## 2018-06-22 NOTE — TELEPHONE ENCOUNTER
Called home number. Left message on answering machine to return the call. This call was concerning message below per Fiona ARROYO.     ----- Message from Eldon Caceres sent at 6/22/2018  7:45 AM EDT -----  x-ray was normal. Continue with ibuprofen 800 mg as needed and we'll re-check his leg at his diabetes follow up in a few weeks.  Return sooner if symptoms are worsening

## 2018-06-22 NOTE — PROGRESS NOTES
x-ray was normal. Continue with ibuprofen 800 mg as needed and we'll re-check his leg at his diabetes follow up in a few weeks.  Return sooner if symptoms are worsening

## 2018-06-25 ENCOUNTER — TELEPHONE (OUTPATIENT)
Dept: FAMILY MEDICINE CLINIC | Age: 38
End: 2018-06-25

## 2018-06-25 RX ORDER — GLIPIZIDE 5 MG/1
5 TABLET ORAL 2 TIMES DAILY
Qty: 60 TAB | Refills: 0 | Status: SHIPPED | OUTPATIENT
Start: 2018-06-25 | End: 2018-11-05 | Stop reason: SDUPTHER

## 2018-06-25 NOTE — TELEPHONE ENCOUNTER
OK to stop metformin as he is not tolerating it. Will switch to glipizide - sent to pharmacy.    Side effects of this medication include low blood sugar, so it is important that he takes it twice daily with meals

## 2018-06-25 NOTE — TELEPHONE ENCOUNTER
Called home number. Verified name and . Spoke with patient. Per patient Metformin is causing diarrhea since yesterday, per patient going to bathroom every 30 minutes patient stated 3 times of loose stool. Per patient taking Metformin about 1 month Patient stated headaches and leg pain. Spoke with patient about medication. Patient stated has not being taking Metformin as prescribed and will not be taking it anymore. Patient stated has not been taking his blood sugar. Patient was notified of importance of checking blood sugar. Patient self checked blood sugar on while on phone with a result of 323 mg/dl. Notified patient will notify provider. Patient had no further questions or concerns.

## 2018-06-25 NOTE — TELEPHONE ENCOUNTER
Called home number. Verified name and . Spoke with patient. Notified of message below per Sandra ARROYO. Patient had no further questions or concerns.

## 2018-06-25 NOTE — TELEPHONE ENCOUNTER
Called home number. Verified name and . Spoke with patient. Patient notified of results below per Juan ARROYO. Patient understood the reason for call and had no further questions or concerns.

## 2018-07-02 ENCOUNTER — OFFICE VISIT (OUTPATIENT)
Dept: FAMILY MEDICINE CLINIC | Age: 38
End: 2018-07-02

## 2018-07-02 VITALS
OXYGEN SATURATION: 96 % | DIASTOLIC BLOOD PRESSURE: 70 MMHG | RESPIRATION RATE: 12 BRPM | BODY MASS INDEX: 32.73 KG/M2 | TEMPERATURE: 99.1 F | HEIGHT: 69 IN | HEART RATE: 98 BPM | WEIGHT: 221 LBS | SYSTOLIC BLOOD PRESSURE: 106 MMHG

## 2018-07-02 DIAGNOSIS — M79.605 LEFT LEG PAIN: Primary | ICD-10-CM

## 2018-07-02 NOTE — PROGRESS NOTES
Patient: Bradford Mazariegos MRN: [id number]  SSN: [id number]    YOB: 1980  Age: 44 y.o. Sex: male      Date of Service: 7/2/2018   Provider: CRUZ Haidre   Office Location:   [address] [facility]  [address], [address]  Office Phone: [phone]  Office Fax: [phone]        REASON FOR VISIT:   Chief Complaint   Patient presents with    Leg Pain     Left        VITALS:   Visit Vitals    /70 (BP 1 Location: Right arm, BP Patient Position: Sitting)    Pulse 98    Temp 99.1 °F (37.3 °C) (Oral)    Resp 12    Ht 5' 9\" (1.753 m)    Wt 221 lb (100.2 kg)    SpO2 96%    BMI 32.64 kg/m2       MEDICATIONS:   Current Outpatient Prescriptions   Medication Sig Dispense Refill    glipiZIDE (GLUCOTROL) 5 mg tablet Take 1 Tab by mouth two (2) times a day. 60 Tab 0    ibuprofen (MOTRIN) 800 mg tablet Take 1 Tab by mouth every eight (8) hours as needed for Pain. 60 Tab 0    ketoconazole (NIZORAL) 2 % shampoo       ketoconazole (NIZORAL) 2 % topical cream       simvastatin (ZOCOR) 10 mg tablet Take 1 Tab by mouth nightly. 30 Tab 0    Blood-Glucose Meter monitoring kit Check blood glucose once daily in the morning Dx: E11.9 1 Kit 0    Lancets misc Check blood glucose once daily in the morning Dx: E11.9 1 Each 11    glucose blood VI test strips (BLOOD GLUCOSE TEST) strip Check blood glucose once daily in the morning Dx: E11.9 100 Strip 11        ALLERGIES:   No Known Allergies     ACTIVE MEDICAL PROBLEMS:  Patient Active Problem List   Diagnosis Code    Type 2 diabetes mellitus without complication, without long-term current use of insulin ([facility]) E11.9    Mixed hyperlipidemia E78.2          HISTORY OF PRESENT ILLNESS:   Bradford Mazariegos is a 44 y.o. male who presents to the office for acute care. Patient is a newly diagnosed type 2 diabetic who is here today in follow up of acute left leg pain x 3-4 weeks.    He was initially seen for this on 6/20 and at that time reported that he woke up with the pain one day. There was specific inciting injury that he can recall. Pain is circumferential around his proximal thigh, and is aggravated by walking, weight bearing, externally rotating or abducting his hip. He's been taking ibuprofen 800 mg with some relief. He denies any numbness, tingling, weakness. Denies any redness, bruising, or swelling to the affected area. No fevers, chills, recent travel/surgery/prolonged immobility. No personal hx of DVT though thinks his dad did have \"blood clots\" at some point. REVIEW OF SYSTEMS:  Review of Systems   Constitutional: Negative for chills, fever and malaise/fatigue. Respiratory: Negative for cough, shortness of breath and wheezing. Cardiovascular: Negative for chest pain, palpitations and leg swelling. PHYSICAL EXAMINATION:  Physical Exam   Constitutional: He is oriented to person, place, and time and well-developed, well-nourished, and in no distress. Cardiovascular: Normal rate, regular rhythm and normal heart sounds. Exam reveals no gallop and no friction rub. No murmur heard. Pulmonary/Chest: Effort normal and breath sounds normal. He has no wheezes. He has no rales. Musculoskeletal:   Left hip: He exhibits decreased range of motion ( pain with passive abduction and external rotation, full ROM otherwise). He exhibits normal strength, no tenderness, no bony tenderness, no swelling and no deformity. Left knee: He exhibits normal range of motion, no swelling and no deformity. No tenderness found. Neurological: He is alert and oriented to person, place, and time. Gait normal.   Skin: Skin is warm and dry. No rash noted.    Psychiatric: Mood, memory and affect normal.        RESULTS:    Results from Hospital Encounter encounter on 06/20/18   XR HIP LT W OR WO PELV 2-3 VWS   Narrative EXAM: LEFT HIP TWO VIEWS    CLINICAL HISTORY/INDICATION: Left anterior hip pain x2 weeks, new diagnosis of  diabetes. COMPARISON: None. TECHNIQUE: AP pelvis and frog view of the left hip obtained. FINDINGS:     The bony pelvic ring is intact. The left hip joint space is unremarkable. There  is no evidence of fracture or dislocation. Mineralization is normal.         Impression IMPRESSION:    Negative hip. ASSESSMENT/PLAN:  Diagnoses and all orders for this visit:    1. Left leg pain  - Etiology is unclear as pain is quite poorly localized. Pain seems to be reproducible with weight bearing and with external rotation and abduction of the hip, so I suspect a musculoskeletal cause, possible muscle strain.   - We will obtain a venous duplex of the left leg to r/o DVT though clinical suspicion is low  - Patient also requests an MRI. We discussed that if the ultrasound is negative, it may be worthwhile for him to try physical therapy, and then if that is not helping or if symptoms continue to worsen, proceed with MRI  Orders:    -     DUPLEX LOWER EXT VENOUS LEFT; Future        All questions answered. Patient expresses understanding and agrees with the plan as detailed above. Follow-up Disposition:  Return for follow up as scheduled on 7/16 for diabetes. Check A1c and urine microalbumin at that visit. Latanya Corona        PATIENT CARE TEAM:   Patient Care Team:  CRUZ Gonzales as PCP - General (Physician Assistant)  None       Prem Gallardo, [address]   July 2, 2018    10:42 AM

## 2018-07-02 NOTE — PROGRESS NOTES
Chief Complaint   Patient presents with    Leg Pain     Left     Visit Vitals    /70 (BP 1 Location: Right arm, BP Patient Position: Sitting)    Pulse 98    Temp 99.1 °F (37.3 °C) (Oral)    Resp 12    Ht 5' 9\" (1.753 m)    Wt 221 lb (100.2 kg)    SpO2 96%    BMI 32.64 kg/m2       Patient is not fasting. Patient in room # 6.     1. Have you been to the ER, urgent care clinic since your last visit? Hospitalized since your last visit? No    2. Have you seen or consulted any other health care providers outside of the [address] [place] [address] since your last visit? Include any pap smears or colon screening. No     Reviewed. Patient self checked blood sugar 120 mg/dl on today.

## 2018-07-06 ENCOUNTER — HOSPITAL ENCOUNTER (OUTPATIENT)
Dept: VASCULAR SURGERY | Age: 38
Discharge: HOME | End: 2018-07-06
Attending: PHYSICIAN ASSISTANT
Payer: COMMERCIAL

## 2018-07-06 DIAGNOSIS — M79.605 LEFT LEG PAIN: ICD-10-CM

## 2018-07-06 PROCEDURE — 93971 EXTREMITY STUDY: CPT

## 2018-07-09 ENCOUNTER — TELEPHONE (OUTPATIENT)
Dept: FAMILY MEDICINE CLINIC | Age: 38
End: 2018-07-09

## 2018-07-09 NOTE — PROGRESS NOTES
Called home number. Verified name and . Spoke with patient. Patient notified of result below per Tameka ARROYO. Patient understood the reason for call and had no further questions or concerns.

## 2018-07-09 NOTE — PROGRESS NOTES
Called home number. Left message on answering machine to return the call. This call was concerning message below per Claudia ARROYO.

## 2018-07-18 DIAGNOSIS — M79.605 LEFT LEG PAIN: ICD-10-CM

## 2018-07-18 RX ORDER — IBUPROFEN 800 MG/1
800 TABLET ORAL
Qty: 60 TAB | Refills: 0 | Status: SHIPPED | OUTPATIENT
Start: 2018-07-18 | End: 2018-08-17 | Stop reason: SDUPTHER

## 2018-07-18 NOTE — TELEPHONE ENCOUNTER
Refill request sent to provider for approval or denial. Last refill on Motrin 800 mg. Last office visit on 07/02/2018. Next appointment on 07/23/2018.

## 2018-07-18 NOTE — TELEPHONE ENCOUNTER
This patient contacted office for the following prescriptions to be filled:    Medication requested :   Requested Prescriptions     Pending Prescriptions Disp Refills    ibuprofen (MOTRIN) 800 mg tablet 60 Tab 0     Sig: Take 1 Tab by mouth every eight (8) hours as needed for Pain.      PCP: Ryan Richmond or Print: [facility]  Mail order or Local pharmacy [phone]    Scheduled appointment if not seen by current providers in office: lov 7/2/18 nishant 7/23/18

## 2018-07-23 ENCOUNTER — OFFICE VISIT (OUTPATIENT)
Dept: FAMILY MEDICINE CLINIC | Age: 38
End: 2018-07-23

## 2018-07-23 VITALS
OXYGEN SATURATION: 95 % | HEIGHT: 69 IN | DIASTOLIC BLOOD PRESSURE: 94 MMHG | SYSTOLIC BLOOD PRESSURE: 112 MMHG | RESPIRATION RATE: 12 BRPM | TEMPERATURE: 99 F | WEIGHT: 221.8 LBS | BODY MASS INDEX: 32.85 KG/M2 | HEART RATE: 95 BPM

## 2018-07-23 DIAGNOSIS — E11.9 TYPE 2 DIABETES MELLITUS WITHOUT COMPLICATION, WITHOUT LONG-TERM CURRENT USE OF INSULIN (HCC): ICD-10-CM

## 2018-07-23 DIAGNOSIS — I10 ESSENTIAL HYPERTENSION: ICD-10-CM

## 2018-07-23 DIAGNOSIS — M79.605 LEFT LEG PAIN: ICD-10-CM

## 2018-07-23 DIAGNOSIS — E11.9 TYPE 2 DIABETES MELLITUS WITHOUT COMPLICATION, WITHOUT LONG-TERM CURRENT USE OF INSULIN (HCC): Primary | ICD-10-CM

## 2018-07-23 DIAGNOSIS — L25.9 CONTACT DERMATITIS, UNSPECIFIED CONTACT DERMATITIS TYPE, UNSPECIFIED TRIGGER: ICD-10-CM

## 2018-07-23 DIAGNOSIS — E78.2 MIXED HYPERLIPIDEMIA: ICD-10-CM

## 2018-07-23 LAB
ALBUMIN UR QL STRIP: 30 MG/L
CREATININE, URINE POC: 300 MG/DL
HBA1C MFR BLD HPLC: 8.8 %
MICROALBUMIN/CREAT RATIO POC: <30 MG/G

## 2018-07-23 RX ORDER — METFORMIN HYDROCHLORIDE 500 MG/1
TABLET ORAL DAILY
Refills: 0 | COMMUNITY
Start: 2018-05-17 | End: 2018-07-23

## 2018-07-23 RX ORDER — HYDROXYZINE HYDROCHLORIDE 10 MG/1
TABLET, FILM COATED ORAL AS NEEDED
Refills: 3 | COMMUNITY
Start: 2018-07-12 | End: 2018-07-23

## 2018-07-23 RX ORDER — CLOBETASOL PROPIONATE 0.5 MG/G
CREAM TOPICAL DAILY
Refills: 2 | COMMUNITY
Start: 2018-07-12 | End: 2018-07-23

## 2018-07-23 RX ORDER — LISINOPRIL 10 MG/1
1 TABLET ORAL DAILY
Refills: 0 | COMMUNITY
Start: 2018-05-19 | End: 2018-11-05

## 2018-07-23 RX ORDER — METFORMIN HYDROCHLORIDE 1000 MG/1
1000 TABLET ORAL 2 TIMES DAILY WITH MEALS
COMMUNITY
End: 2018-11-05 | Stop reason: SDUPTHER

## 2018-07-23 NOTE — MR AVS SNAPSHOT
[facility] 
[phone] Patient: Aye Abreu MRN: [id number] F:[birth year] Visit Information Date & Time Provider Department Dept. Phone Encounter #  
 7/23/2018  1:00 PM Bharath Leavitt, [address] [place] [address] Road [id number] Follow-up Instructions Return in about 1 month (around 8/23/2018) for diabetes checkup. Upcoming Health Maintenance Date Due  
 FOOT EXAM Q1 8/23/1989 MICROALBUMIN Q1 8/23/1989 EYE EXAM RETINAL OR DILATED Q1 8/23/1989 Pneumococcal 19-64 Medium Risk (1 of 1 - PPSV23) 8/23/1998 DTaP/Tdap/Td series (1 - Tdap) 8/23/2000 Influenza Age 5 to Adult 8/1/2018 HEMOGLOBIN A1C Q6M 12/8/2018 LIPID PANEL Q1 6/8/2019 Allergies as of 7/23/2018  Review Complete On: 7/23/2018 By: CRUZ Darling No Known Allergies Current Immunizations  Never Reviewed No immunizations on file. Not reviewed this visit You Were Diagnosed With   
  
 Codes Comments Type 2 diabetes mellitus without complication, without long-term current use of insulin (HCC)    -  Primary ICD-10-CM: E11.9 ICD-9-CM: 250.00 Essential hypertension     ICD-10-CM: I10 
ICD-9-CM: 401.9 Mixed hyperlipidemia     ICD-10-CM: E78.2 ICD-9-CM: 272.2 Contact dermatitis, unspecified contact dermatitis type, unspecified trigger     ICD-10-CM: L25.9 ICD-9-CM: 692.9 Left leg pain     ICD-10-CM: M79.605 ICD-9-CM: 729.5 Vitals BP Pulse Temp Resp Height(growth percentile) Weight(growth percentile) (!) 112/94 (BP 1 Location: Left arm, BP Patient Position: Sitting) 95 99 °F (37.2 °C) (Oral) 12 5' 9\" (1.753 m) 221 lb 12.8 oz (100.6 kg) SpO2 BMI Smoking Status 95% 32.75 kg/m2 Never Smoker Vitals History BMI and BSA Data Body Mass Index Body Surface Area 32.75 kg/m 2 2.21 m 2 Preferred Pharmacy Pharmacy Name Phone [address], [address] [facility] [phone] Your Updated Medication List  
  
   
This list is accurate as of 7/23/18  1:33 PM.  Always use your most recent med list.  
  
  
  
  
 Blood-Glucose Meter monitoring kit Check blood glucose once daily in the morning Dx: E11.9  
  
 flash glucose sensor Kit Commonly known as:  [facility] Check blood glucose twice daily Dx: E11.9  
  
 glipiZIDE 5 mg tablet Commonly known as:  Tadeo Luke Take 1 Tab by mouth two (2) times a day. glucose blood VI test strips strip Commonly known as:  blood glucose test  
Check blood glucose once daily in the morning Dx: E11.9  
  
 ibuprofen 800 mg tablet Commonly known as:  MOTRIN Take 1 Tab by mouth every eight (8) hours as needed for Pain. Lancets Misc Check blood glucose once daily in the morning Dx: E11.9  
  
 lisinopril 10 mg tablet Commonly known as:  Robertha Roup Take 1 Tab by mouth daily. metFORMIN 1,000 mg tablet Commonly known as:  GLUCOPHAGE Take 1,000 mg by mouth two (2) times daily (with meals). simvastatin 10 mg tablet Commonly known as:  ZOCOR Take 1 Tab by mouth nightly. Prescriptions Sent to Pharmacy Refills  
 flash glucose sensor (FREESTYLE LOULOU SENSOR) kit 0 Sig: Check blood glucose twice daily Dx: E11.9 Class: Normal  
 Pharmacy: [id number] [place] [address] [address] [place] [address] #: [phone] We Performed the Following AMB POC HEMOGLOBIN A1C [91678 CPT(R)] AMB POC URINE, MICROALBUMIN, SEMIQUANT (3 RESULTS) [86194 CPT(R)]  DIABETES FOOT EXAM [HM7 Custom] REFERRAL TO OPHTHALMOLOGY [REF57 Custom] Comments:  
 Please refer for routine diabetic eye exam  
 REFERRAL TO PHYSICAL THERAPY [QZH48 Custom] Follow-up Instructions Return in about 1 month (around 8/23/2018) for diabetes checkup. Referral Information Referral ID Referred By Referred To  
  
 [id number] LYNDA, [address] [facility] [address]   
   [address] [facility] [address], [address] [facility] Phone: [phone] Fax: [phone] Visits Status Start Date End Date 1 New Request 7/23/18 7/23/19 If your referral has a status of pending review or denied, additional information will be sent to support the outcome of this decision. Referral ID Referred By Referred To  
 [id number] Jose Montes MD  
   [facility] [place] [address] [place], [address] Phone: [phone] Fax: [phone] Visits Status Start Date End Date 1 New Request 7/23/18 7/23/19 If your referral has a status of pending review or denied, additional information will be sent to support the outcome of this decision. Patient Instructions Learning About Diabetes Food Guidelines Your Care Instructions Meal planning is important to manage diabetes. It helps keep your blood sugar at a target level (which you set with your doctor). You don't have to eat special foods. You can eat what your family eats, including sweets once in a while. But you do have to pay attention to how often you eat and how much you eat of certain foods. You may want to work with a dietitian or a certified diabetes educator (CDE) to help you plan meals and snacks. A dietitian or CDE can also help you lose weight if that is one of your goals. What should you know about eating carbs? Managing the amount of carbohydrate (carbs) you eat is an important part of healthy meals when you have diabetes. Carbohydrate is found in many foods. · Learn which foods have carbs. And learn the amounts of carbs in different foods. ¨ Bread, cereal, pasta, and rice have about 15 grams of carbs in a serving. A serving is 1 slice of bread (1 ounce), ½ cup of cooked cereal, or 1/3 cup of cooked pasta or rice. ¨ Fruits have 15 grams of carbs in a serving. A serving is 1 small fresh fruit, such as an apple or orange; ½ of a banana; ½ cup of cooked or canned fruit; ½ cup of fruit juice; 1 cup of melon or raspberries; or 2 tablespoons of dried fruit. ¨ Milk and no-sugar-added yogurt have 15 grams of carbs in a serving. A serving is 1 cup of milk or 2/3 cup of no-sugar-added yogurt. ¨ Starchy vegetables have 15 grams of carbs in a serving. A serving is ½ cup of mashed potatoes or sweet potato; 1 cup winter squash; ½ of a small baked potato; ½ cup of cooked beans; or ½ cup cooked corn or green peas. · Learn how much carbs to eat each day and at each meal. A dietitian or CDE can teach you how to keep track of the amount of carbs you eat. This is called carbohydrate counting. · If you are not sure how to count carbohydrate grams, use the Plate Method to plan meals. It is a good, quick way to make sure that you have a balanced meal. It also helps you spread carbs throughout the day. ¨ Divide your plate by types of foods. Put non-starchy vegetables on half the plate, meat or other protein food on one-quarter of the plate, and a grain or starchy vegetable in the final quarter of the plate. To this you can add a small piece of fruit and 1 cup of milk or yogurt, depending on how many carbs you are supposed to eat at a meal. 
· Try to eat about the same amount of carbs at each meal. Do not \"save up\" your daily allowance of carbs to eat at one meal. 
· Proteins have very little or no carbs per serving. Examples of proteins are beef, chicken, turkey, fish, eggs, tofu, cheese, cottage cheese, and peanut butter. A serving size of meat is 3 ounces, which is about the size of a deck of cards. Examples of meat substitute serving sizes (equal to 1 ounce of meat) are 1/4 cup of cottage cheese, 1 egg, 1 tablespoon of peanut butter, and ½ cup of tofu. How can you eat out and still eat healthy? · Learn to estimate the serving sizes of foods that have carbohydrate. If you measure food at home, it will be easier to estimate the amount in a serving of restaurant food. · If the meal you order has too much carbohydrate (such as potatoes, corn, or baked beans), ask to have a low-carbohydrate food instead. Ask for a salad or green vegetables. · If you use insulin, check your blood sugar before and after eating out to help you plan how much to eat in the future. · If you eat more carbohydrate at a meal than you had planned, take a walk or do other exercise. This will help lower your blood sugar. What else should you know? · Limit saturated fat, such as the fat from meat and dairy products. This is a healthy choice because people who have diabetes are at higher risk of heart disease. So choose lean cuts of meat and nonfat or low-fat dairy products. Use olive or canola oil instead of butter or shortening when cooking. · Don't skip meals. Your blood sugar may drop too low if you skip meals and take insulin or certain medicines for diabetes. · Check with your doctor before you drink alcohol. Alcohol can cause your blood sugar to drop too low. Alcohol can also cause a bad reaction if you take certain diabetes medicines. Follow-up care is a key part of your treatment and safety. Be sure to make and go to all appointments, and call your doctor if you are having problems. It's also a good idea to know your test results and keep a list of the medicines you take. Where can you learn more? Go to http://chelsie-karin.info/. Enter N353 in the search box to learn more about \"Learning About Diabetes Food Guidelines. \" Current as of: December 7, 2017 Content Version: 11.7 © 0195-9488 [employer]. Care instructions adapted under license by [employer] (which disclaims liability or warranty for this information).  If you have questions about a medical condition or this instruction, always ask your healthcare professional. [place] [address] any warranty or liability for your use of this information. Introducing [facility]! [employer] introduces [employer] patient portal. Now you can access parts of your medical record, email your doctor's office, and request medication refills online. 1. In your internet browser, go to [email or web address] 2. Click on the First Time User? Click Here link in the Sign In box. You will see the New Member Sign Up page. 3. Enter your [employer] Access Code exactly as it appears below. You will not need to use this code after youve completed the sign-up process. If you do not sign up before the expiration date, you must request a new code. · [employer] Access Code: [email or web address] Expires: 9/4/2018  2:27 PM 
 
4. Enter the last four digits of your Social Security Number (xxxx) and Date of Birth (mm/dd/yyyy) as indicated and click Submit. You will be taken to the next sign-up page. 5. Create a [employer] Juices ID. This will be your [employer] login ID and cannot be changed, so think of one that is secure and easy to remember. 6. Create a [employer] password. You can change your password at any time. 7. Enter your Password Reset Question and Answer. This can be used at a later time if you forget your password. 8. Enter your e-mail address. You will receive e-mail notification when new information is available in [address] 9. Click Sign Up. You can now view and download portions of your medical record. 10. Click the Download Summary menu link to download a portable copy of your medical information. If you have questions, please visit the Frequently Asked Questions section of the [employer] website. Remember, [employer] is NOT to be used for urgent needs. For medical emergencies, dial 911. Now available from your iPhone and Android! Please provide this summary of care documentation to your next provider. Your primary care clinician is listed as Alliance Setter. If you have any questions after today's visit, please call [phone].

## 2018-07-23 NOTE — PATIENT INSTRUCTIONS

## 2018-07-23 NOTE — TELEPHONE ENCOUNTER
Pt states he was in for an appt today and a Rx was sent in for [employer], at [address] did not have the kit, and he would like the Rx to be sent to the [facility] on [address] Contact# [phone] Fx# [phone]

## 2018-07-23 NOTE — PROGRESS NOTES
Patient: Richie Jimenez MRN: [id number]  SSN: [id number]    YOB: 1980  Age: 44 y.o. Sex: male      Date of Service: 7/23/2018   Provider: CRUZ Yao   Office Location:   [address] [place], [address]  Office Phone: [phone]  Office Fax: [phone]      REASON FOR VISIT:   Chief Complaint   Patient presents with    Diabetes    Follow-up     [facility] on 07/09/2018, Rash    Follow-up     leg pain        VITALS:   Visit Vitals    BP (!) 112/94 (BP 1 Location: Left arm, BP Patient Position: Sitting)  Comment: manual    Pulse 95    Temp 99 °F (37.2 °C) (Oral)    Resp 12    Ht 5' 9\" (1.753 m)    Wt 221 lb 12.8 oz (100.6 kg)    SpO2 95%    BMI 32.75 kg/m2        MEDICATIONS:   Current Outpatient Prescriptions on File Prior to Visit   Medication Sig Dispense Refill    ibuprofen (MOTRIN) 800 mg tablet Take 1 Tab by mouth every eight (8) hours as needed for Pain. 60 Tab 0    triamcinolone-dimethicone 0.1-5 % ktoc Apply to affected area twice daily for one week as needed. Indications: Contact Dermatitis 1 Each 0    glipiZIDE (GLUCOTROL) 5 mg tablet Take 1 Tab by mouth two (2) times a day. 60 Tab 0    simvastatin (ZOCOR) 10 mg tablet Take 1 Tab by mouth nightly. 30 Tab 0    Blood-Glucose Meter monitoring kit Check blood glucose once daily in the morning Dx: E11.9 1 Kit 0    Lancets misc Check blood glucose once daily in the morning Dx: E11.9 1 Each 11    glucose blood VI test strips (BLOOD GLUCOSE TEST) strip Check blood glucose once daily in the morning Dx: E11.9 100 Strip 11     No current facility-administered medications on file prior to visit. ALLERGIES:   No Known Allergies     MEDICAL/SURGICAL HISTORY:  Past Medical History:   Diagnosis Date    Hyperlipidemia     Type 2 diabetes mellitus ([address]       History reviewed. No pertinent surgical history.      FAMILY HISTORY:  Family History   Problem Relation Age of Onset    Hypertension Mother     High Cholesterol Mother     Diabetes Father     Hypertension Father     High Cholesterol Father     Cancer Mother         SOCIAL HISTORY:  Social History   Substance Use Topics    Smoking status: Never Smoker    Smokeless tobacco: Never Used    Alcohol use 1.8 oz/week     3 Cans of beer per week      Comment: rarely             HISTORY OF PRESENT ILLNESS: Evangelista Bowden is a 44 y.o. male who presents to the office for a routine follow up visit. Diabetes -  Relatively new diagnosis, poorly controlled   Last A1c: 7.6% on 6/8/18  Today's A1c: 8.8%  He is prescribed glipizide 5 mg BID. We discontinued metformin due to GI Side effects. However, today, patient reports he has been taking both the glipizide and the metformin, but has only been taking each one once daily. Home glucose readings: AM fastings have been averaging in the low 200s    Last urine microalbumin: never, due today   Last lipid panel: 6/8/18,  at that time. Patient has since been started on statin therapy. Last foot exam: never, due today  Last eye exam: unknown     Hypertension -  BP is stable today  Patient reports compliance with lisinopril 10 mg daily    Leg Pain -   Patient continues to complain of pain in his left proximal thigh. It is exacerbating by walking ,weight bearing, externally rotating and abducting his hip. Hip x-ray and venous duplex have been unremarkable. He takes ibuprofen 800 mg PRN with good relief, but is frustrated that pain is not getting any better. REVIEW OF SYSTEMS:  Review of Systems   Constitutional: Negative for chills, fever and malaise/fatigue. Respiratory: Negative for cough, shortness of breath and wheezing. Cardiovascular: Negative for chest pain, palpitations and leg swelling. Gastrointestinal: Negative for abdominal pain, nausea and vomiting.         PHYSICAL EXAMINATION:  Physical Exam   Constitutional: He is oriented to person, place, and time and well-developed, well-nourished, and in no distress. Cardiovascular: Normal rate, regular rhythm and normal heart sounds. Exam reveals no gallop and no friction rub. No murmur heard. Pulmonary/Chest: Effort normal and breath sounds normal. He has no wheezes. He has no rales. Neurological: He is alert and oriented to person, place, and time. Gait normal.   Skin: Skin is warm and dry. No rash noted. Psychiatric: Mood, memory and affect normal.   Diabetic Foot Exam: DP pulses 2+ symmetric, no open areas or skin breakdown noted, sensory intact to filament and positioning. Vibratory sensation not tested. Nails in good condition. RESULTS:  Results for orders placed or performed in visit on 07/23/18   AMB POC HEMOGLOBIN A1C   Result Value Ref Range    Hemoglobin A1c (POC) 8.8 %   AMB POC URINE, MICROALBUMIN, SEMIQUANT (3 RESULTS)   Result Value Ref Range    ALBUMIN, URINE POC 30 Negative mg/L    CREATININE, URINE  mg/dL    Microalbumin/creat ratio (POC) <30 <30 MG/G        ASSESSMENT/PLAN:  Diagnoses and all orders for this visit:    1. Type 2 diabetes mellitus without complication, without long-term current use of insulin ([facility])  - A1c has trended upward substantially. It seems patient has not been taking his meds properly  - For now, I am going to have him take both the glipizide and the metformin TWICE daily. He will notify me of any untoward side effects  - Foot exam and microalbumin were done today and were normal  - Referred for eye exam  Orders:    -     AMB POC HEMOGLOBIN A1C  -     AMB POC URINE, MICROALBUMIN, SEMIQUANT (3 RESULTS)  -      DIABETES FOOT EXAM  -     flash glucose sensor (FREESTYLE LOULOU SENSOR) kit; Check blood glucose twice daily Dx: E11.9  -     REFERRAL TO OPHTHALMOLOGY    2. Essential hypertension  - Stable, continue lisinopril     3.  Mixed hyperlipidemia  - Tolerating statin therapy, will plan to repeat lipid and hepatic panel at next visit for monitoring 4. Contact dermatitis, unspecified contact dermatitis type, unspecified trigger  - Resolved, d/c steroid creams     5. Left leg pain  - Will refer for physical therapy eval  Orders:    -     BRANDI Shaffer      Follow-up Disposition:  Return in about 1 month (around 8/23/2018) for diabetes checkup. All questions answered. Patient expresses understanding and agrees with the plan as detailed above.     PATIENT CARE TEAM:   Patient Care Team:  CRUZ Patricia as PCP - General (Physician Assistant)  None       Sugey Howe [place]   July 23, 2018    1:39 PM

## 2018-07-23 NOTE — PROGRESS NOTES
Chief Complaint   Patient presents with    Diabetes    Follow-up     [facility] on 07/09/2018, Rash    Follow-up     leg pain     Visit Vitals    BP (!) 112/94 (BP 1 Location: Left arm, BP Patient Position: Sitting)    Pulse 95    Temp 99 °F (37.2 °C) (Oral)    Resp 12    Ht 5' 9\" (1.753 m)    Wt 221 lb 12.8 oz (100.6 kg)    SpO2 95%    BMI 32.75 kg/m2       Patient is not fasting. Patient in room # 5.     1. Have you been to the ER, urgent care clinic since your last visit? Hospitalized since your last visit? Yes When: 07/09/2018 Where: [facility] Reason for visit: Rash to both legs    2. Have you seen or consulted any other health care providers outside of the [address] [place] [address] since your last visit? Include any pap smears or colon screening. No     Reviewed.   Verbal order for in office hgba1c and microalbumin per CRUZ Jeffers/Chiquita Olvera LPN

## 2018-07-25 ENCOUNTER — TELEPHONE (OUTPATIENT)
Dept: FAMILY MEDICINE CLINIC | Age: 38
End: 2018-07-25

## 2018-07-25 DIAGNOSIS — E11.9 TYPE 2 DIABETES MELLITUS WITHOUT COMPLICATION, WITHOUT LONG-TERM CURRENT USE OF INSULIN (HCC): ICD-10-CM

## 2018-07-25 NOTE — TELEPHONE ENCOUNTER
[facility] called and request the actual Carrol Meter set  Along with a 90 day supply of the sensor.  Please call [facility] at your earliest convenience

## 2018-07-26 NOTE — TELEPHONE ENCOUNTER
Can you see if there is any chance the pharmacy can fax me an order for what they are requesting? I am not familiar with the Jha sensor, this was just requested by the patient.

## 2018-07-26 NOTE — TELEPHONE ENCOUNTER
[facility]. Verified name and . Spoke with staff. Per staff patient needs [facility] Readers which is device and the sensor kit is the reading of glucose, 1 sensor last for 9 days. Staff understood the reason for call and had no further questions or concerns.

## 2018-07-30 DIAGNOSIS — E11.9 TYPE 2 DIABETES MELLITUS WITHOUT COMPLICATION, WITHOUT LONG-TERM CURRENT USE OF INSULIN (HCC): ICD-10-CM

## 2018-07-30 NOTE — TELEPHONE ENCOUNTER
This pharmacy faxed over request for the following prescriptions to be filled:    Medication requested :One Touch Verio Test Strip 100   PCP: [address] or Print: [facility] order or Local [phone]     Scheduled appointment if not seen by current providers in office: LOV 07/23/18 upcoming 08/27/18

## 2018-07-31 ENCOUNTER — HOSPITAL ENCOUNTER (OUTPATIENT)
Dept: PHYSICAL THERAPY | Age: 38
Discharge: HOME | End: 2018-07-31
Payer: COMMERCIAL

## 2018-07-31 PROCEDURE — 97110 THERAPEUTIC EXERCISES: CPT

## 2018-07-31 PROCEDURE — 97162 PT EVAL MOD COMPLEX 30 MIN: CPT

## 2018-07-31 NOTE — PROGRESS NOTES
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 Assessment/ key information: Pt is a 45 yo M who presents with left anterior leg pain with insidious onset 2 months. Subjective reports of pain aggravated with hip flexion and IR/ER in hip flexion as well as with walking/standing immediately. Pain is relieved with lying supine with left leg extended. Pt is severely TTP over proximal quad/adductors. No visible or palpable abnormalities noted. Strength of left LE was decreased throughout, limited by pain. Findings were inconsistently indicative of muscular pain. Resisted hip flexion/knee extension was painful during MMT but was not painful with SLR. Manually resisted hip adduction during shotgun portion of MET was not painful; however, pain was present with end-range hip flexor, quad, and butterfly stretch. Pt presented with pelvic obliquity of left PI, reporting significant pain relief following correction. Findings most consistent with irritation of malaligned musculature from pelvic obliquity. Pt will benefit from skilled PT to address flexibility and core/hip stability deficits in order to maintain pelvic alignment for improved WB tolerance and return to PLOF. Evaluation Complexity History MEDIUM  Complexity : 1-2 comorbidities / personal factors will impact the outcome/ POC ; Examination MEDIUM Complexity : 3 Standardized tests and measures addressing body structure, function, activity limitation and / or participation in recreation  ;Presentation MEDIUM Complexity : Evolving with changing characteristics  ; Clinical Decision Making MEDIUM Complexity : FOTO score of 26-74  Overall Complexity Rating: MEDIUM  Problem List: pain affecting function, decrease ROM, decrease strength, decrease ADL/ functional abilitiies, decrease activity tolerance and decrease flexibility/ joint mobility   Treatment Plan may include any combination of the following: Therapeutic exercise, Therapeutic activities, Neuromuscular re-education, Physical agent/modality, Gait/balance training, Manual therapy, Patient education, Self Care training, Functional mobility training, Home safety training and Stair training  Patient / Family readiness to learn indicated by: asking questions, trying to perform skills and interest  Persons(s) to be included in education: patient (P)  Barriers to Learning/Limitations: None  Patient Goal (s): to fix the leg  Patient Self Reported Health Status: fair  Rehabilitation Potential: good    Short Term Goals: To be accomplished in 1 weeks:  1. Pt will be compliant with initial HEP to improve therapy outcomes   Long Term Goals: To be accomplished in 6 weeks:  1. Pt will improve FOTO by 8 points in order to demonstrate functional improvement   2. Pt will improve left LE strength to 4/5 in order to improve ease of prolonged standing/ambulation  3. Pt will report 50% improvement in sx in order to improve QOL. Frequency / Duration: Patient to be seen 2-3 times per week for 6 weeks.     Patient/ CarPatient/ Caregiver education and instruction: Diagnosis, prognosis, exercises   [x]  Plan of care has been reviewed with SHILA Anthony PT 7/31/2018 1:10 PM    ________________________________________________________________________    I certify that the above Therapy Services are being furnished while the patient is under my care. I agree with the treatment plan and certify that this therapy is necessary.     Physician's Signature:____________________  Date:____________Time: _________    Please sign and return to [facility] - Kostas Beatty  [address] [place] [address]  [phone] fax

## 2018-07-31 NOTE — PROGRESS NOTES
PT DAILY TREATMENT NOTE/HIP EVAL3-16    Patient Name: Herminia Booth  Date:2018  : 1980  [x]  Patient  Verified  Payor: BLUE CROSS / Plan: [address] [place] [address] / Product Type: PPO /    In time:1:07  Out time:1:50  Total Treatment Time (min): 43  Total Timed Codes (min): 18  1:1 Treatment Time ( only): 37   Visit #: 1 of     Treatment Area: Pain in left leg [M79.605]    SUBJECTIVE  Pain Level (0-10 scale): 4/10  []constant []intermittent []improving []worsening []no change since onset    Any medication changes, allergies to medications, adverse drug reactions, diagnosis change, or new procedure performed?: [x] No    [] Yes (see summary sheet for update)  Subjective functional status/changes:     Pt is a 43 yo M who presents with left hip pain with insidious onset 2 months. Subjective reports of pain aggravated with hip flexion and IR/ER in hip flexion as well as with walking/standing immediately. Pain is relieved with lying supine with left leg extended.          Barriers: []pain []financial []time []transportation []other  Motivation: high  Substance use: []Alcohol []Tobacco []other:   FABQ Score: []low []elevate  Cognition: A & O x 4    Other:    OBJECTIVE/EXAMINATION      25 min [x]Eval                  []Re-Eval       9 min Therapeutic Exercise:  [] See flow sheet : see HEP    Rationale: increase ROM and increase strength to improve the patients ability to improve ease of prolonged WB    6 min Therapeutic Activity:  []  See flow sheet :   Rationale: Educated patient regarding findings of evaluation, anatomy of spine/pelvis using spine model, purpose of MET, heat use 10-15 minutes, self DTM with tennis ball, new therex technique and purpose, purpose of therapy, and therapy plan in order to ensure pt understanding/compliance with therapy       3 min Manual Therapy:  MET with shotgun to correct left PI   Rationale: decrease pain, increase ROM and increase tissue extensibility to improve ease of prolonged standing/ambulation             With   [] TE   [] TA   [] neuro   [] other: Patient Education: [x] Review HEP    [] Progressed/Changed HEP based on:   [] positioning   [] body mechanics   [] transfers   [] heat/ice application    [] other:      Other Objective/Functional Measures:     /102 taken manually prior to therapy     Physical Therapy Evaluation- Hip      Gait:  [x] Normal    [] Abnormal    [] Antalgic    [] NWB    Device:    Describe:         ROM/Strength        AROM                     PROM        Strength (1-5)  Hip Left Right Left Right Left Right   Flexion     3+ p! 5   Extension     3+ 3+   Abduction      4-   Adduction     3-    ER 20 30   3 p! 4+   IR 25 35   4 4   Knee Left Right Left Right Left Right   Extension     4- p! 5   Flexion     3+ p! 5     DF Right 5/5, Left 4/5    Left LE strength limited by pain       Flexibility: [] Unable to assess at this time  Quadriceps:    (L) Tightness= [] WNL   [x] Min   [] Mod   [] Severe    (R) Tightness= [] WNL   [] Min   [] Mod   [] Severe  Hip Flexor:    (L) Tightness= [] WNL   [] Min   [x] Mod   [] Severe    (R) Tightness= [] WNL   [] Min   [] Mod   [] Severe                                  Palpation  [] Min  [] Mod  [x] Severe    Location: TTP left proximal quad/adductors    Optional Tests  Curtis/ Sai Test: [] Neg    [x] Pos  Walker Test:  [] Neg    [x] Pos  Scouring Test : [x] Neg    [] Pos  Piriformis Test:  [] Neg    [x] Pos - resulted in pain over quad/adductions, no posterior pain over piriformis    (-) Supine SLR    Other tests/ comments:    Prone quad stretch: pain at 108 dgrs on left    Had pt remove jemichaela (had boxers that he kept on) for further visual/palpation examination of proximal quad/adductors as findings were inconsistent with quad or adductor muscular pain (stretching of each resulted in pain but actively using muscles with manual resistance did not consistently cause pain, evidenced by no pain with active SLR or with manually resisted hip adduction during shotgun portion of MET) - palpated only over tender area on proximal quad/adductors, no palpable abnormalities or visible skin abnormalities such as ecchymosis    Pain with hip flexor stretch, prone quad stretch, and butterfly stretch - pt instructed to always perform in pain-free range    Pain reduced with bridging and with standing following correction of pelvic obliquity      Pain Level (0-10 scale) post treatment: 2/10    ASSESSMENT/Changes in Function: See POC    Patient will continue to benefit from skilled PT services to modify and progress therapeutic interventions, address functional mobility deficits, address ROM deficits, address strength deficits, analyze and address soft tissue restrictions, analyze and cue movement patterns, analyze and modify body mechanics/ergonomics, assess and modify postural abnormalities and instruct in home and community integration to attain remaining goals.      [x]  See Plan of Care  []  See progress note/recertification  []  See Discharge Summary         Progress towards goals / Updated goals:  See POC    PLAN  [x]  Upgrade activities as tolerated     []  Continue plan of care  [x]  Update interventions per flow sheet       []  Discharge due to:_  []  Other:_      Mee Healy, PT 7/31/2018  1:10 PM

## 2018-08-02 ENCOUNTER — HOSPITAL ENCOUNTER (OUTPATIENT)
Dept: PHYSICAL THERAPY | Age: 38
Discharge: HOME | End: 2018-08-02
Payer: COMMERCIAL

## 2018-08-02 PROCEDURE — 97140 MANUAL THERAPY 1/> REGIONS: CPT

## 2018-08-02 PROCEDURE — 97110 THERAPEUTIC EXERCISES: CPT

## 2018-08-02 NOTE — PROGRESS NOTES
PT DAILY TREATMENT NOTE - [facility]     Patient Name: Liz Cabrera  Date:2018  : 1980  [x]  Patient  Verified  Payor: Maricel Landis / Plan: [address] [place] [address] / Product Type: PPO /    In time:1258  Out time:140  Total Treatment Time (min): 42  Total Timed Codes (min): 32  1:1 Treatment Time ( only): 32   Visit #: 2 of     Treatment Area: Pain in left leg [M53.852]    SUBJECTIVE  Pain Level (0-10 scale): 0/10  Any medication changes, allergies to medications, adverse drug reactions, diagnosis change, or new procedure performed?: [x] No    [] Yes (see summary sheet for update)  Subjective functional status/changes:   [] No changes reported  Pt stated that he has some soreness in his upper left thigh    OBJECTIVE    Modality rationale: decrease pain and increase tissue extensibility to improve the patients ability to increase ease with ADLs   Min Type Additional Details    [] Estim:  []Unatt       []IFC  []Premod                        []Other:  []w/ice   []w/heat  Position:  Location:    [] Estim: []Att    []TENS instruct  []NMES                    []Other:  []w/US   []w/ice   []w/heat  Position:  Location:    []  Traction: [] Cervical       []Lumbar                       [] Prone          []Supine                       []Intermittent   []Continuous Lbs:  [] before manual  [] after manual    []  Ultrasound: []Continuous   [] Pulsed                           []1MHz   []3MHz W/cm2:  Location:    []  Iontophoresis with dexamethasone         Location: [] Take home patch   [] In clinic   10 []  Ice     [x]  heat  []  Ice massage  []  Laser   []  Anodyne Position: supine  Location:left prox thigh    []  Laser with stim  []  Other:  Position:  Location:    []  Vasopneumatic Device Pressure:       [] lo [] med [] hi   Temperature: [] lo [] med [] hi   [x] Skin assessment post-treatment:  [x]intact []redness- no adverse reaction    []redness - adverse reaction:     24 min Therapeutic Exercise: [x] See flow sheet :   Rationale: increase ROM and increase strength to improve the patients ability to increase ease with ADLs    8 min Manual Therapy:  Pelvic alignment check, MET and shot gun to correct left PI   Rationale: increase ROM and increase tissue extensibility to increase ease with ADLs and walking    With   [x] TE   [] TA   [] neuro   [] other: Patient Education: [x] Review HEP    [] Progressed/Changed HEP based on:   [] positioning   [] body mechanics   [] transfers   [] heat/ice application    [] other:      Other Objective/Functional Measures:   Needed continuous cueing not to hold breath with exercises  No complaint of pain with exercises  Has decreased flexibility in left HS     Pain Level (0-10 scale) post treatment: 1/10    ASSESSMENT/Changes in Function:   Initiated therex today per flow sheet. Pt reported compliance with HEP. Pt put forth good effort with all exercises    Patient will continue to benefit from skilled PT services to address functional mobility deficits, address ROM deficits and address strength deficits to attain remaining goals. [x]  See Plan of Care  []  See progress note/recertification  []  See Discharge Summary         Progress towards goals / Updated goals:  Short Term Goals: To be accomplished in 1 weeks:  1. Pt will be compliant with initial HEP to improve therapy outcomes   Goal met. 8/2/18  Long Term Goals: To be accomplished in 6 weeks:  1. Pt will improve FOTO by 8 points in order to demonstrate functional improvement   2. Pt will improve left LE strength to 4/5 in order to improve ease of prolonged standing/ambulation  3. Pt will report 50% improvement in sx in order to improve QOL.      PLAN  []  Upgrade activities as tolerated     [x]  Continue plan of care  []  Update interventions per flow sheet       []  Discharge due to:_  []  Other:_      Elise Esteban PTA 8/2/2018  1:02 PM    Future Appointments  Date Time Provider Juan Viramontes   8/7/2018 1:00 PM Lizette Reno, PTA [facility]   8/9/2018 1:00 PM [facility] PT [address] [facility]   8/14/2018 1:00 PM Lizette Reno, PTA [facility]   8/16/2018 1:00 PM Lizette Reno, PTA [facility]   8/21/2018 1:30 PM Lizette Reno, PTA [facility]   8/23/2018 10:30 AM Mee Healy, PT [facility]   8/27/2018 11:30 AM CRUZ Woodard SAUL SCHED   8/28/2018 1:00 PM Lizette Reno, PTA [facility]   8/30/2018 10:30 AM Mee Healy, PT [facility]

## 2018-08-07 ENCOUNTER — HOSPITAL ENCOUNTER (OUTPATIENT)
Dept: PHYSICAL THERAPY | Age: 38
Discharge: HOME | End: 2018-08-07
Payer: COMMERCIAL

## 2018-08-07 PROCEDURE — 97110 THERAPEUTIC EXERCISES: CPT

## 2018-08-07 NOTE — PROGRESS NOTES
PT DAILY TREATMENT NOTE - [facility]     Patient Name: Chinedu Lester  Date:2018  : 1980  [x]  Patient  Verified  Payor: BLUE CROSS / Plan: [address] [place] [address] / Product Type: PPO /    In time:100  Out time:128  Total Treatment Time (min): 28  Total Timed Codes (min): 28  1:1 Treatment Time ( only): 28   Visit #: 3 of     Treatment Area: Pain in left leg [M79.605]    SUBJECTIVE  Pain Level (0-10 scale): 0/10  Any medication changes, allergies to medications, adverse drug reactions, diagnosis change, or new procedure performed?: [x] No    [] Yes (see summary sheet for update)  Subjective functional status/changes:   [] No changes reported  Pt stated that he is doing good today    OBJECTIVE    28 min Therapeutic Exercise:  [x] See flow sheet :   Rationale: increase ROM and increase strength to improve the patients ability to increase ease with ADLs    With   [x] TE   [] TA   [] neuro   [] other: Patient Education: [x] Review HEP    [] Progressed/Changed HEP based on:   [] positioning   [] body mechanics   [] transfers   [] heat/ice application    [] other:      Other Objective/Functional Measures:   Pt reported that he has had no pain since last session  Had no complaint of increased pain during session  No difficulty with exercises with add exercises per flow sheet next visit     Pain Level (0-10 scale) post treatment: 0/10    ASSESSMENT/Changes in Function:   Pt is slowly progressing toward goals. Pt cont with no pain. Cont with decreased strength in left LE. Patient will continue to benefit from skilled PT services to modify and progress therapeutic interventions, address functional mobility deficits, address ROM deficits and address strength deficits to attain remaining goals. [x]  See Plan of Care  []  See progress note/recertification  []  See Discharge Summary         Progress towards goals / Updated goals:  Short Term Goals: To be accomplished in 1 weeks:  1.  Pt will be compliant with initial HEP to improve therapy outcomes   Goal met. 8/2/18  Long Term Goals: To be accomplished in 6 weeks:  1. Pt will improve FOTO by 8 points in order to demonstrate functional improvement   2. Pt will improve left LE strength to 4/5 in order to improve ease of prolonged standing/ambulation  3. Pt will report 50% improvement in sx in order to improve QOL.      PLAN  []  Upgrade activities as tolerated     [x]  Continue plan of care  []  Update interventions per flow sheet       []  Discharge due to:_  []  Other:_      Brent Raza PTA 8/7/2018  1:02 PM    Future Appointments  Date Time Provider Juan Ana M   8/9/2018 1:00 PM [facility] PT [address] [facility]   8/14/2018 1:00 PM Brent Raza PTA [facility]   8/16/2018 1:00 PM Brent Raza PTA [facility]   8/21/2018 1:30 PM Brent Raza PTA [facility]   8/23/2018 10:30 AM Silvio Ocampo, PT [id number] [facility]   8/27/2018 11:30 AM CRUZ Montoya SCHED   8/28/2018 1:00 PM Brent Raza PTA [facility]   8/30/2018 10:30 AM Silvio Ocampo, PT [facility]

## 2018-08-09 ENCOUNTER — APPOINTMENT (OUTPATIENT)
Dept: PHYSICAL THERAPY | Age: 38
End: 2018-08-09
Payer: COMMERCIAL

## 2018-08-14 ENCOUNTER — HOSPITAL ENCOUNTER (OUTPATIENT)
Dept: PHYSICAL THERAPY | Age: 38
Discharge: HOME | End: 2018-08-14
Payer: COMMERCIAL

## 2018-08-14 DIAGNOSIS — E11.9 TYPE 2 DIABETES MELLITUS WITHOUT COMPLICATION, WITHOUT LONG-TERM CURRENT USE OF INSULIN (HCC): ICD-10-CM

## 2018-08-14 PROCEDURE — 97110 THERAPEUTIC EXERCISES: CPT

## 2018-08-14 NOTE — TELEPHONE ENCOUNTER
Pt states that insurance will not cover the [facility] Jha sensor. He states that they are trying to charge him $140 for it. He would like to get the strips that he had the first time but is not sure what the name of it was. He said the strips have been ordered before. His mother thinks that he need to have diabetic training because he doesn't know what he can eat. Please advise. [employer] .  He states that he test his sugars 3 times daily

## 2018-08-14 NOTE — TELEPHONE ENCOUNTER
Called home number. Verified name and . Spoke with patient. Requesting refill on test strips. Per stated pharmacy will not supply. Patient checking 3 times daily and times when he feels dizzy. Notified patient that may not be necessary to check that much. Notified that will clarify instructions and if change in instructions new order to be sent to pharmacy. Patient had no further questions or concerns.

## 2018-08-14 NOTE — PROGRESS NOTES
PT DAILY TREATMENT NOTE - [facility]     Patient Name: Richie Jimenez  Date:2018  : 1980  [x]  Patient  Verified  Payor: Vida Zaman / Plan: [address] [place] [address] / Product Type: PPO /    In time:100  Out time:132  Total Treatment Time (min): 32  Total Timed Codes (min): 32  1:1 Treatment Time ( only): 32   Visit #: 4 of     Treatment Area: Pain in left leg [M79.605]    SUBJECTIVE  Pain Level (0-10 scale): 0/10  Any medication changes, allergies to medications, adverse drug reactions, diagnosis change, or new procedure performed?: [x] No    [] Yes (see summary sheet for update)  Subjective functional status/changes:   [] No changes reported  Pt stated that he did not take his BP medication yet today. Pt was informed of the risks of not taking his medication as prescribed by his MD    OBJECTIVE    32 min Therapeutic Exercise:  [x] See flow sheet :   Rationale: increase ROM and increase strength to improve the patients ability to increase ease with ADLs    With   [x] TE   [] TA   [] neuro   [] other: Patient Education: [x] Review HEP    [] Progressed/Changed HEP based on:   [] positioning   [] body mechanics   [] transfers   [] heat/ice application    [] other:      Other Objective/Functional Measures:   Reported increased bilateral thigh pain with LTR, did not perform all reps  No other report of pain during session  Pt declined modalities     Pain Level (0-10 scale) post treatment: 0/10    ASSESSMENT/Changes in Function:   Pt is progressing well toward goals. Pt no longer has any leg pain. Left leg strength is improving. Patient will continue to benefit from skilled PT services to modify and progress therapeutic interventions, address functional mobility deficits, address ROM deficits and address strength deficits to attain remaining goals.      [x]  See Plan of Care  []  See progress note/recertification  []  See Discharge Summary         Progress towards goals / Updated goals:  Short Term Goals: To be accomplished in 1 weeks:  1. Pt will be compliant with initial HEP to improve therapy outcomes   Goal met. 8/2/18  Long Term Goals: To be accomplished in 6 weeks:  1. Pt will improve FOTO by 8 points in order to demonstrate functional improvement   2. Pt will improve left LE strength to 4/5 in order to improve ease of prolonged standing/ambulation  3. Pt will report 50% improvement in sx in order to improve QOL.      PLAN  []  Upgrade activities as tolerated     [x]  Continue plan of care  []  Update interventions per flow sheet       []  Discharge due to:_  []  Other:_      Nubia Roberts PTA 8/14/2018  1:05 PM    Future Appointments  Date Time Provider Juan Viramontes   8/16/2018 1:00 PM Nubia Roberts PTA [facility] [address]   8/21/2018 1:30 PM Nubia Roberts PTA [facility] [address]   8/23/2018 10:30 AM Shefali Duran, PT TNEBMZZ [address]   8/27/2018 11:30 AM CRUZ Nicole   8/28/2018 1:00 PM Nubia Roberts PTA [facility] [address]   8/30/2018 10:30 AM Shefali Duran PT [facility] [address]

## 2018-08-14 NOTE — TELEPHONE ENCOUNTER
I am going to send him a larger supply of test strips. For now, I think it is fine for him to check only AM fasting readings, and then as needed if he is not feeling well.

## 2018-08-16 ENCOUNTER — APPOINTMENT (OUTPATIENT)
Dept: PHYSICAL THERAPY | Age: 38
End: 2018-08-16
Payer: COMMERCIAL

## 2018-08-17 DIAGNOSIS — M79.605 LEFT LEG PAIN: ICD-10-CM

## 2018-08-17 NOTE — TELEPHONE ENCOUNTER
This patient contacted office for the following prescriptions to be filled:    Medication requested :   Requested Prescriptions     Pending Prescriptions Disp Refills    ibuprofen (MOTRIN) 800 mg tablet 60 Tab 0     Sig: Take 1 Tab by mouth every eight (8) hours as needed for Pain.        PCP:  [address] or Print: Larry Cherry  Mail order or Local pharmacy [phone]    Scheduled appointment if not seen by current providers in office: LOV 07/23/18  UPCOMING  VISIT 08/27/18

## 2018-08-20 RX ORDER — IBUPROFEN 800 MG/1
800 TABLET ORAL
Qty: 60 TAB | Refills: 0 | Status: SHIPPED | OUTPATIENT
Start: 2018-08-20 | End: 2018-11-05

## 2018-08-20 NOTE — TELEPHONE ENCOUNTER
Refill request sent to provider for approval or denial. Last refill on 07/18/2018. Last office visit on 07/23/2018. Next appointment on 08/27/2018.

## 2018-08-21 ENCOUNTER — APPOINTMENT (OUTPATIENT)
Dept: PHYSICAL THERAPY | Age: 38
End: 2018-08-21
Payer: COMMERCIAL

## 2018-08-27 ENCOUNTER — OFFICE VISIT (OUTPATIENT)
Dept: FAMILY MEDICINE CLINIC | Age: 38
End: 2018-08-27

## 2018-08-27 VITALS
SYSTOLIC BLOOD PRESSURE: 120 MMHG | DIASTOLIC BLOOD PRESSURE: 80 MMHG | TEMPERATURE: 98.9 F | BODY MASS INDEX: 32.76 KG/M2 | HEART RATE: 94 BPM | HEIGHT: 69 IN | RESPIRATION RATE: 12 BRPM | OXYGEN SATURATION: 95 % | WEIGHT: 221.2 LBS

## 2018-08-27 DIAGNOSIS — I10 ESSENTIAL HYPERTENSION: ICD-10-CM

## 2018-08-27 DIAGNOSIS — E78.2 MIXED HYPERLIPIDEMIA: ICD-10-CM

## 2018-08-27 DIAGNOSIS — E11.9 TYPE 2 DIABETES MELLITUS WITHOUT COMPLICATION, WITHOUT LONG-TERM CURRENT USE OF INSULIN (HCC): Primary | ICD-10-CM

## 2018-08-27 NOTE — PATIENT INSTRUCTIONS

## 2018-08-27 NOTE — PROGRESS NOTES
Patient: Brittni oRblero MRN: [id number]  SSN: [id number]    YOB: 1980  Age: 43 y.o. Sex: male      Date of Service: 8/27/2018   Provider: CRUZ Patricia   Office Location:   [address] [place] [address] [address], [address]  Office Phone: [phone]  Office Fax: [phone]      REASON FOR VISIT:   Chief Complaint   Patient presents with    Diabetes    Elevated Blood Pressure        VITALS:   Visit Vitals    /80 (BP 1 Location: Right arm, BP Patient Position: Sitting)  Comment: manual    Pulse 94    Temp 98.9 °F (37.2 °C) (Oral)    Resp 12    Ht 5' 9\" (1.753 m)    Wt 221 lb 3.2 oz (100.3 kg)    SpO2 95%    BMI 32.67 kg/m2        MEDICATIONS:   Current Outpatient Prescriptions on File Prior to Visit   Medication Sig Dispense Refill    ibuprofen (MOTRIN) 800 mg tablet Take 1 Tab by mouth every eight (8) hours as needed for Pain. 60 Tab 0    glucose blood VI test strips (BLOOD GLUCOSE TEST) strip Check blood glucose BID and as needed Dx: E11.9 200 Strip 11    lisinopril (PRINIVIL, ZESTRIL) 10 mg tablet Take 1 Tab by mouth daily. 0    metFORMIN (GLUCOPHAGE) 1,000 mg tablet Take 1,000 mg by mouth two (2) times daily (with meals).  glipiZIDE (GLUCOTROL) 5 mg tablet Take 1 Tab by mouth two (2) times a day. 60 Tab 0    simvastatin (ZOCOR) 10 mg tablet Take 1 Tab by mouth nightly. 30 Tab 0    Blood-Glucose Meter monitoring kit Check blood glucose once daily in the morning Dx: E11.9 1 Kit 0    Lancets misc Check blood glucose once daily in the morning Dx: E11.9 1 Each 11    flash glucose sensor (FREESTYLE LOULOU SENSOR) kit Check blood glucose twice daily Dx: E11.9 3 Each 2    flash glucose scanning reader (FREESTYLE LOULOU READER) misc 1 Device by Does Not Apply route two (2) times a day. Dx: E11.9 1 Each 0     No current facility-administered medications on file prior to visit.          ALLERGIES:   No Known Allergies MEDICAL/SURGICAL HISTORY:  Past Medical History:   Diagnosis Date    Hyperlipidemia     Type 2 diabetes mellitus ([address]       History reviewed. No pertinent surgical history. FAMILY HISTORY:  Family History   Problem Relation Age of Onset    Hypertension Mother     High Cholesterol Mother     Diabetes Father     Hypertension Father     High Cholesterol Father     Cancer Mother         SOCIAL HISTORY:  Social History   Substance Use Topics    Smoking status: Never Smoker    Smokeless tobacco: Never Used    Alcohol use 1.8 oz/week     3 Cans of beer per week      Comment: rarely             HISTORY OF PRESENT ILLNESS: Benna Cowden is a 43 y.o. male who presents to the office for a routine follow up visit. Diabetes -  Currently the patient's condition is stable, but not at goal  Last A1c: 8.8% on 7/23/18  Reports compliance with the following regimen: metformin 1000 mg BID, glipizide 5 mg BID  Home glucose readings: AM fasting ranging 140-160  He continues to eat at [employer] often and does not exercise. Drinks soda daily. Last urine microalbumin: 7/23/18  Last foot exam: 7/23/18  Last eye exam: last week at [facility]    Hypertension -   Currently, the patient's condition is well controlled . Reports compliance with the following regimen: lisinopril 10 mg daily  Home BP readings: not checking     Hyperlipidemia -   Labs from 6/8/18 revealed , HDL 32, trig 250  Patient was subsequently started on statin therapy, which he has been tolerating without adverse effect  He is due to re-check labs      Leg Pain -   Resolved with PT    Patient denies symptomatic hypo- or hyperglycemia since last visit. Denies headaches, lightheadedness, dizziness, chest pain, SOB, heart palpitations, nausea, vomiting, change in BMs, urinary frequency/urgency, skin changes/wounds, sensory disturbances         REVIEW OF SYSTEMS:  ROS as noted in HPI.     PHYSICAL EXAMINATION:  Physical Exam Constitutional: He is oriented to person, place, and time and well-developed, well-nourished, and in no distress. Cardiovascular: Normal rate, regular rhythm and normal heart sounds. Exam reveals no gallop and no friction rub. No murmur heard. Pulmonary/Chest: Effort normal and breath sounds normal. He has no wheezes. He has no rales. Neurological: He is alert and oriented to person, place, and time. Gait normal.   Skin: Skin is warm and dry. No rash noted. Psychiatric: Mood, memory and affect normal.        RESULTS:  No results found for this visit on 08/27/18. ASSESSMENT/PLAN:  Diagnoses and all orders for this visit:    1. Type 2 diabetes mellitus without complication, without long-term current use of insulin (HCC)  - Stable, but not at goal  - Discussed with patient that his options are to make some lifestyle changes, and/or to add additional meds, which he would really like to avoid  - I'd like him specifically to focus on cutting out soda and fast food, and getting some regular cardiovascular exercise a few times weekly  - Repeat labs prior to next visit in 6 weeks  Orders:    -     METABOLIC PANEL, COMPREHENSIVE; Future  -     HEMOGLOBIN A1C W/O EAG; Future  -     LIPID PANEL; Future    2. Essential hypertension  - BP at goal today  - Continue current regimen     3. Mixed hyperlipidemia  - Recently started statin therapy  - Repeat labs in about 6 weeks  Orders:    -     METABOLIC PANEL, COMPREHENSIVE; Future  -     LIPID PANEL; Future        Follow-up Disposition:  Return in about 6 weeks (around 10/8/2018) for fasting labs 1 week prior. All questions answered. Patient expresses understanding and agrees with the plan as detailed above.     PATIENT CARE TEAM:   Patient Care Team:  CRUZ Goode as PCP - General (Physician Assistant)  Danica Retana [place]   August 27, 2018    1:00 PM

## 2018-08-27 NOTE — MR AVS SNAPSHOT
[address] 
[phone] Patient: Laura Simons MRN: [id number] Visit Information Date & Time Provider Department Dept. Phone Encounter #  
 8/27/2018 11:30 AM Arias Hilliard, [address] [facility] [address] [id number] Follow-up Instructions Return in about 6 weeks (around 10/8/2018) for fasting labs 1 week prior. Upcoming Health Maintenance Date Due Pneumococcal 19-64 Medium Risk (1 of 1 - PPSV23) 8/23/1998 DTaP/Tdap/Td series (1 - Tdap) 8/23/2000 Influenza Age 5 to Adult 8/1/2018 HEMOGLOBIN A1C Q6M 1/23/2019 LIPID PANEL Q1 6/8/2019 FOOT EXAM Q1 7/23/2019 MICROALBUMIN Q1 7/23/2019 EYE EXAM RETINAL OR DILATED Q1 7/23/2019 Allergies as of 8/27/2018  Review Complete On: 8/27/2018 By: CRUZ Hall No Known Allergies Current Immunizations  Never Reviewed No immunizations on file. Not reviewed this visit You Were Diagnosed With   
  
 Codes Comments Type 2 diabetes mellitus without complication, without long-term current use of insulin (HCC)    -  Primary ICD-10-CM: E11.9 ICD-9-CM: 250.00 Essential hypertension     ICD-10-CM: I10 
ICD-9-CM: 401.9 Mixed hyperlipidemia     ICD-10-CM: E78.2 ICD-9-CM: 272.2 Vitals BP Pulse Temp Resp Height(growth percentile) Weight(growth percentile) 120/80 (BP 1 Location: Right arm, BP Patient Position: Sitting) 94 98.9 °F (37.2 °C) (Oral) 12 5' 9\" (1.753 m) 221 lb 3.2 oz (100.3 kg) SpO2 BMI Smoking Status 95% 32.67 kg/m2 Never Smoker Vitals History BMI and BSA Data Body Mass Index Body Surface Area  
 32.67 kg/m 2 2.21 m 2 Preferred Pharmacy Pharmacy Name Phone [address], [address] [place] [address] [phone] Your Updated Medication List  
  
   
 This list is accurate as of 8/27/18 12:27 PM.  Always use your most recent med list.  
  
  
  
  
 Blood-Glucose Meter monitoring kit Check blood glucose once daily in the morning Dx: E11.9  
  
 flash glucose scanning reader Misc Commonly known as:  FREESTYLE LOULOU READER  
1 Device by Does Not Apply route two (2) times a day. Dx: E11.9  
  
 flash glucose sensor Kit Commonly known as:  [address] [place] [address] Check blood glucose twice daily Dx: E11.9  
  
 glipiZIDE 5 mg tablet Commonly known as:  [place]ra [place] Take 1 Tab by mouth two (2) times a day. glucose blood VI test strips strip Commonly known as:  blood glucose test  
Check blood glucose BID and as needed Dx: E11.9  
  
 ibuprofen 800 mg tablet Commonly known as:  MOTRIN Take 1 Tab by mouth every eight (8) hours as needed for Pain. Lancets Misc Check blood glucose once daily in the morning Dx: E11.9  
  
 lisinopril 10 mg tablet Commonly known as:  Radha Pop Take 1 Tab by mouth daily. metFORMIN 1,000 mg tablet Commonly known as:  GLUCOPHAGE Take 1,000 mg by mouth two (2) times daily (with meals). simvastatin 10 mg tablet Commonly known as:  ZOCOR Take 1 Tab by mouth nightly. Follow-up Instructions Return in about 6 weeks (around 10/8/2018) for fasting labs 1 week prior. To-Do List   
 08/27/2018 Lab:  HEMOGLOBIN A1C W/O EAG   
  
 08/27/2018 Lab:  METABOLIC PANEL, COMPREHENSIVE Around 08/28/2018 Lab:  LIPID PANEL   
  
 08/28/2018 1:00 PM  
  Appointment with Vandana Davis PTA at [facility] PT [address] [facility] ([phone])  
  
 08/30/2018 10:30 AM  
  Appointment with Irene Keys PT at . [place] [address] ([phone]) Patient Instructions A Healthy Lifestyle: Care Instructions Your Care Instructions A healthy lifestyle can help you feel good, stay at a healthy weight, and have plenty of energy for both work and play. A healthy lifestyle is something you can share with your whole family. A healthy lifestyle also can lower your risk for serious health problems, such as high blood pressure, heart disease, and diabetes. You can follow a few steps listed below to improve your health and the health of your family. Follow-up care is a key part of your treatment and safety. Be sure to make and go to all appointments, and call your doctor if you are having problems. It's also a good idea to know your test results and keep a list of the medicines you take. How can you care for yourself at home? · Do not eat too much sugar, fat, or fast foods. You can still have dessert and treats now and then. The goal is moderation. · Start small to improve your eating habits. Pay attention to portion sizes, drink less juice and soda pop, and eat more fruits and vegetables. ¨ Eat a healthy amount of food. A 3-ounce serving of meat, for example, is about the size of a deck of cards. Fill the rest of your plate with vegetables and whole grains. ¨ Limit the amount of soda and sports drinks you have every day. Drink more water when you are thirsty. ¨ Eat at least 5 servings of fruits and vegetables every day. It may seem like a lot, but it is not hard to reach this goal. A serving or helping is 1 piece of fruit, 1 cup of vegetables, or 2 cups of leafy, raw vegetables. Have an apple or some carrot sticks as an afternoon snack instead of a candy bar. Try to have fruits and/or vegetables at every meal. 
· Make exercise part of your daily routine. You may want to start with simple activities, such as walking, bicycling, or slow swimming. Try to be active 30 to 60 minutes every day. You do not need to do all 30 to 60 minutes all at once. For example, you can exercise 3 times a day for 10 or 20 minutes.  Moderate exercise is safe for most people, but it is always a good idea to talk to your doctor before starting an exercise program. 
· Keep moving. Shannan Knuckles the lawn, work in the garden, or [employer]. Take the stairs instead of the elevator at work. · If you smoke, quit. People who smoke have an increased risk for heart attack, stroke, cancer, and other lung illnesses. Quitting is hard, but there are ways to boost your chance of quitting tobacco for good. ¨ Use nicotine gum, patches, or lozenges. ¨ Ask your doctor about stop-smoking programs and medicines. ¨ Keep trying. In addition to reducing your risk of diseases in the future, you will notice some benefits soon after you stop using tobacco. If you have shortness of breath or asthma symptoms, they will likely get better within a few weeks after you quit. · Limit how much alcohol you drink. Moderate amounts of alcohol (up to 2 drinks a day for men, 1 drink a day for women) are okay. But drinking too much can lead to liver problems, high blood pressure, and other health problems. Family health If you have a family, there are many things you can do together to improve your health. · Eat meals together as a family as often as possible. · Eat healthy foods. This includes fruits, vegetables, lean meats and dairy, and whole grains. · Include your family in your fitness plan. Most people think of activities such as jogging or tennis as the way to fitness, but there are many ways you and your family can be more active. Anything that makes you breathe hard and gets your heart pumping is exercise. Here are some tips: 
¨ Walk to do errands or to take your child to school or the bus. ¨ Go for a family bike ride after dinner instead of watching TV. Where can you learn more? Go to http://chelsie-karin.info/. Enter Y490 in the search box to learn more about \"A Healthy Lifestyle: Care Instructions. \" Current as of: December 7, 2017 Content Version: 11.7 © 9620-6223 Healthwise, Incorporated. Care instructions adapted under license by [employer] (which disclaims liability or warranty for this information). If you have questions about a medical condition or this instruction, always ask your healthcare professional. [address] any warranty or liability for your use of this information. Introducing [facility] HE[facility]! [facility] introduces happyview patient portal. Now you can access parts of your medical record, email your doctor's office, and request medication refills online. 1. In your internet browser, go to [email or web address] 2. Click on the First Time User? Click Here link in the Sign In box. You will see the New Member Sign Up page. 3. Enter your happyview Access Code exactly as it appears below. You will not need to use this code after youve completed the sign-up process. If you do not sign up before the expiration date, you must request a new code. · happyview Access Code: [email or web address] Expires: 9/4/2018  2:27 PM 
 
4. Enter the last four digits of your Social Security Number (xxxx) and Date of Birth (mm/dd/yyyy) as indicated and click Submit. You will be taken to the next sign-up page. 5. Create a happyview ID. This will be your happyview login ID and cannot be changed, so think of one that is secure and easy to remember. 6. Create a happyview password. You can change your password at any time. 7. Enter your Password Reset Question and Answer. This can be used at a later time if you forget your password. 8. Enter your e-mail address. You will receive e-mail notification when new information is available in [address] 9. Click Sign Up. You can now view and download portions of your medical record. 10. Click the Download Summary menu link to download a portable copy of your medical information.  
 
If you have questions, please visit the Frequently Asked Questions section of the [employer]. Remember, [employer] is NOT to be used for urgent needs. For medical emergencies, dial 911. Now available from your iPhone and Android! Please provide this summary of care documentation to your next provider. Your primary care clinician is listed as Prem Gallardo. If you have any questions after today's visit, please call [phone].

## 2018-08-28 ENCOUNTER — HOSPITAL ENCOUNTER (OUTPATIENT)
Dept: PHYSICAL THERAPY | Age: 38
Discharge: HOME | End: 2018-08-28
Payer: COMMERCIAL

## 2018-08-28 PROCEDURE — 97110 THERAPEUTIC EXERCISES: CPT

## 2018-08-28 NOTE — PROGRESS NOTES
PT DAILY TREATMENT NOTE - [facility]     Patient Name: Arcenio Ramsey  Date:2018  : 1980  [x]  Patient  Verified  Payor: BLUE CROSS / Plan: [address] [place] [address] / Product Type: PPO /    In time:108  Out time:131  Total Treatment Time (min): 23  Total Timed Codes (min): 23  1:1 Treatment Time ( only): 23   Visit #: 5 of     Treatment Area: Pain in left leg [M79.605]    SUBJECTIVE  Pain Level (0-10 scale): 0/10  Any medication changes, allergies to medications, adverse drug reactions, diagnosis change, or new procedure performed?: [x] No    [] Yes (see summary sheet for update)  Subjective functional status/changes:   [] No changes reported  Pt stated that he is doing well today    OBJECTIVE    23 min Therapeutic Exercise:  [x] See flow sheet :   Rationale: increase ROM and increase strength to improve the patients ability to increase ease with ADLs    With   [x] TE   [] TA   [] neuro   [] other: Patient Education: [x] Review HEP    [] Progressed/Changed HEP based on:   [] positioning   [] body mechanics   [] transfers   [] heat/ice application    [] other:      Other Objective/Functional Measures:        Pain Level (0-10 scale) post treatment: 0/10    ASSESSMENT/Changes in Function:   []  See Plan of Care  []  See progress note/recertification  [x]  See Discharge Summary         Progress towards goals / Updated goals:  Short Term Goals: To be accomplished in 1 weeks:  1. Pt will be compliant with initial HEP to improve therapy outcomes   Goal met. 18  Long Term Goals: To be accomplished in 6 weeks:  1. Pt will improve FOTO by 8 points in order to demonstrate functional improvement   Goal met. Increased from 60 to 98. 18  2. Pt will improve left LE strength to 4/5 in order to improve ease of prolonged standing/ambulation  Goal met. Strength for all motions in left LE are 5/5. 18  3. Pt will report 50% improvement in sx in order to improve QOL. Goal met.  Pt reports 100% improvement in sx's. 8/28/'18    PLAN  []  Upgrade activities as tolerated     []  Continue plan of care  []  Update interventions per flow sheet       [x]  Discharge due to:all goals met  []  Other:_      Vandana Davis PTA 8/28/2018  1:09 PM    Future Appointments  Date Time Provider Juan Viramontes   8/30/2018 10:30 AM Irene Keys PT [id number]   10/8/2018 11:00 AM CRUZ Rice 56

## 2018-08-29 NOTE — PROGRESS NOTES
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 Status at discharge: met    Goal: Pt will improve FOTO by 8 points in order to demonstrate functional improvement   Status at last note/certification: Goal met. Increased from 60 to 98. Status at discharge: met    Goal: Pt will improve left LE strength to 4/5 in order to improve ease of prolonged standing/ambulation  Status at last note/certification: Goal met. Strength for all motions in left LE are 5/5. Status at discharge: met    Goal: Pt will report 50% improvement in sx in order to improve QOL. Status at last note/certification: Goal met. Pt reports 100% improvement in sx's  Status at discharge: met      ASSESSMENT/RECOMMENDATIONS:    Pt has made steady progress in therapy, meeting 4/4 initial goals. He reports 100% improvement and no functional limitations at this time. DC as pt has returned to PLOF.     [x]Discontinue therapy: [x]Patient has reached or is progressing toward set goals      []Patient is non-compliant or has abdicated      []Due to lack of appreciable progress towards set goals    Aaron Nicholas, PT 2018 2:36 PM

## 2018-08-30 ENCOUNTER — APPOINTMENT (OUTPATIENT)
Dept: PHYSICAL THERAPY | Age: 38
End: 2018-08-30
Payer: COMMERCIAL

## 2018-11-03 ENCOUNTER — HOSPITAL ENCOUNTER (OUTPATIENT)
Dept: LAB | Age: 38
Discharge: HOME | End: 2018-11-03

## 2018-11-03 LAB — XX-LABCORP SPECIMEN COL,LCBCF: NORMAL

## 2018-11-03 PROCEDURE — 99001 SPECIMEN HANDLING PT-LAB: CPT | Performed by: PHYSICIAN ASSISTANT

## 2018-11-04 LAB
ALBUMIN SERPL-MCNC: 4.6 G/DL (ref 3.5–5.5)
ALBUMIN/GLOB SERPL: 1.6 {RATIO} (ref 1.2–2.2)
ALP SERPL-CCNC: 75 IU/L (ref 39–117)
ALT SERPL-CCNC: 36 IU/L (ref 0–44)
AST SERPL-CCNC: 17 IU/L (ref 0–40)
BILIRUB SERPL-MCNC: 0.3 MG/DL (ref 0–1.2)
BUN SERPL-MCNC: 14 MG/DL (ref 6–20)
BUN/CREAT SERPL: 12 (ref 9–20)
CALCIUM SERPL-MCNC: 9.9 MG/DL (ref 8.7–10.2)
CHLORIDE SERPL-SCNC: 101 MMOL/L (ref 96–106)
CHOLEST SERPL-MCNC: 289 MG/DL (ref 100–199)
CO2 SERPL-SCNC: 23 MMOL/L (ref 20–29)
CREAT SERPL-MCNC: 1.19 MG/DL (ref 0.76–1.27)
GLOBULIN SER CALC-MCNC: 2.9 G/DL (ref 1.5–4.5)
GLUCOSE SERPL-MCNC: 137 MG/DL (ref 65–99)
HBA1C MFR BLD: 8 % (ref 4.8–5.6)
HDLC SERPL-MCNC: 32 MG/DL
INTERPRETATION, 910389: NORMAL
LDLC SERPL CALC-MCNC: 212 MG/DL (ref 0–99)
Lab: NORMAL
POTASSIUM SERPL-SCNC: 4.5 MMOL/L (ref 3.5–5.2)
PROT SERPL-MCNC: 7.5 G/DL (ref 6–8.5)
SODIUM SERPL-SCNC: 139 MMOL/L (ref 134–144)
SPECIMEN STATUS REPORT, ROLRST: NORMAL
TRIGL SERPL-MCNC: 227 MG/DL (ref 0–149)
VLDLC SERPL CALC-MCNC: 45 MG/DL (ref 5–40)

## 2018-11-05 ENCOUNTER — OFFICE VISIT (OUTPATIENT)
Dept: FAMILY MEDICINE CLINIC | Age: 38
End: 2018-11-05

## 2018-11-05 VITALS
OXYGEN SATURATION: 96 % | HEART RATE: 95 BPM | DIASTOLIC BLOOD PRESSURE: 84 MMHG | SYSTOLIC BLOOD PRESSURE: 116 MMHG | HEIGHT: 69 IN | WEIGHT: 222.2 LBS | RESPIRATION RATE: 12 BRPM | BODY MASS INDEX: 32.91 KG/M2 | TEMPERATURE: 98.8 F

## 2018-11-05 DIAGNOSIS — E11.9 TYPE 2 DIABETES MELLITUS WITHOUT COMPLICATION, WITHOUT LONG-TERM CURRENT USE OF INSULIN (HCC): Primary | ICD-10-CM

## 2018-11-05 DIAGNOSIS — I10 ESSENTIAL HYPERTENSION: ICD-10-CM

## 2018-11-05 DIAGNOSIS — Z23 ENCOUNTER FOR IMMUNIZATION: ICD-10-CM

## 2018-11-05 DIAGNOSIS — E78.2 MIXED HYPERLIPIDEMIA: ICD-10-CM

## 2018-11-05 RX ORDER — SIMVASTATIN 10 MG/1
10 TABLET, FILM COATED ORAL
Qty: 30 TAB | Refills: 2 | Status: SHIPPED | OUTPATIENT
Start: 2018-11-05 | End: 2019-03-06 | Stop reason: SDUPTHER

## 2018-11-05 RX ORDER — GLIPIZIDE 5 MG/1
5 TABLET ORAL 2 TIMES DAILY
Qty: 60 TAB | Refills: 2 | Status: SHIPPED | OUTPATIENT
Start: 2018-11-05 | End: 2019-03-06 | Stop reason: SDUPTHER

## 2018-11-05 RX ORDER — METFORMIN HYDROCHLORIDE 1000 MG/1
1000 TABLET ORAL 2 TIMES DAILY WITH MEALS
Qty: 60 TAB | Refills: 2 | Status: SHIPPED | OUTPATIENT
Start: 2018-11-05 | End: 2019-03-06 | Stop reason: SINTOL

## 2018-11-05 NOTE — PROGRESS NOTES
Patient: Abraham Fernandez MRN: [id number]  SSN: [id number]    YOB: 1980  Age: 43 y.o. Sex: male      Date of Service: 11/5/2018   Provider: CRUZ Briones   Office Location:   [address] Vince mohangas, [address]  Office Phone: [phone]  Office Fax: [phone]      REASON FOR VISIT:   Chief Complaint   Patient presents with    Diabetes    Hypertension    Cholesterol Problem    Results        VITALS:   Visit Vitals  /84 (BP 1 Location: Right arm, BP Patient Position: Sitting) Comment: manual   Pulse 95   Temp 98.8 °F (37.1 °C) (Oral)   Resp 12   Ht 5' 9\" (1.753 m)   Wt 222 lb 3.2 oz (100.8 kg)   SpO2 96%   BMI 32.81 kg/m²        MEDICATIONS:   Current Outpatient Medications on File Prior to Visit   Medication Sig Dispense Refill    glucose blood VI test strips (BLOOD GLUCOSE TEST) strip Check blood glucose BID and as needed Dx: E11.9 200 Strip 11    Blood-Glucose Meter monitoring kit Check blood glucose once daily in the morning Dx: E11.9 1 Kit 0    Lancets misc Check blood glucose once daily in the morning Dx: E11.9 1 Each 11     No current facility-administered medications on file prior to visit. ALLERGIES:   No Known Allergies     MEDICAL/SURGICAL HISTORY:  Past Medical History:   Diagnosis Date    Hyperlipidemia     Type 2 diabetes mellitus ([address]       No past surgical history on file. FAMILY HISTORY:  Family History   Problem Relation Age of Onset    Hypertension Mother     High Cholesterol Mother     Diabetes Father     Hypertension Father     High Cholesterol Father     Cancer Mother         SOCIAL HISTORY:  Social History     Tobacco Use    Smoking status: Never Smoker    Smokeless tobacco: Never Used   Substance Use Topics    Alcohol use: Yes     Alcohol/week: 1.8 oz     Types: 3 Cans of beer per week     Comment: rarely    Drug use:  No             HISTORY OF PRESENT ILLNESS: Ivan LIN Maria D Silva is a 43 y.o. male who presents to the office for a routine follow up visit. Diabetes -  Currently the patient's condition is improved, but not at goal.   Last A1c: 8.0% on 11/3/18  Reports compliance with the following regimen: metformin 1000 mg BID, glipizide 5 mg daily (supposed to be BID, but patient is only taking once)   Home glucose readings: AM fasting ranging 140-160  He reports that since his last visit, he has really started making an effort with his diet. He states he is eating \"mostly baked fish and broccoli. \" He also has a new job as the  for his building, so he is more physically active. Last urine microalbumin: 7/23/18  Last foot exam: 7/23/18  Last eye exam: last week at [facility]     Hypertension -   Currently, the patient's condition is well controlled . He is not taking the lisinopril. Home BP readings: not checking      Hyperlipidemia -    Labs from 11/3/18 revealed , HDL 32, trig 227. He now mentions to me that the only pills he has been taking are his two diabetes medications, and he does not believe he has been taking his statin. REVIEW OF SYSTEMS:  Review of Systems   Constitutional: Negative for chills, fever and malaise/fatigue. Respiratory: Negative for cough, shortness of breath and wheezing. Cardiovascular: Negative for chest pain, palpitations and leg swelling. Gastrointestinal: Negative for abdominal pain, constipation, diarrhea, nausea and vomiting. PHYSICAL EXAMINATION:  Physical Exam   Constitutional: He is oriented to person, place, and time and well-developed, well-nourished, and in no distress. Cardiovascular: Normal rate, regular rhythm and normal heart sounds. Exam reveals no gallop and no friction rub. No murmur heard. Pulmonary/Chest: Effort normal and breath sounds normal. He has no wheezes. He has no rales. Neurological: He is alert and oriented to person, place, and time.  Gait normal.   Skin: Skin is warm and dry. No rash noted. Psychiatric: Mood, memory and affect normal.        RESULTS:  No results found for this visit on 11/05/18. ASSESSMENT/PLAN:  Diagnoses and all orders for this visit:    1. Type 2 diabetes mellitus without complication, without long-term current use of insulin (HCC)  - Improving with lifestyle modifications  - Continue to work on diet, exercise, and medication compliance  - Reminded that glipizide is to be BID  Orders:    -     metFORMIN (GLUCOPHAGE) 1,000 mg tablet; Take 1 Tab by mouth two (2) times daily (with meals). -     glipiZIDE (GLUCOTROL) 5 mg tablet; Take 1 Tab by mouth two (2) times a day. 2. Mixed hyperlipidemia  - Patient advised to start statin therapy  Orders:    -     simvastatin (ZOCOR) 10 mg tablet; Take 1 Tab by mouth nightly. 3. Essential hypertension  - Patient has not been taking lisinopril and BP is at goal  - OK to hold ACE inhibitor for now     4. Encounter for immunization  - Flu shot administered today with patient's consent. VIS provided. Orders:    -     INFLUENZA VIRUS VAC QUAD,SPLIT,PRESV FREE SYRINGE IM        Follow-up Disposition:  Return in about 3 months (around 2/5/2019) for POC A1c at visit. All questions answered. Patient expresses understanding and agrees with the plan as detailed above.     PATIENT CARE TEAM:   Patient Care Team:  CRUZ Sierra as PCP - General (Physician Assistant)  Danica [place]   November 5, 2018   11:52 AM

## 2018-11-05 NOTE — PATIENT INSTRUCTIONS
Vaccine Information Statement    Influenza (Flu) Vaccine (Inactivated or Recombinant): What you need to know    Many Vaccine Information Statements are available in [place] and other languages. See www.immunize.org/vis  Hojas de Información Sobre Vacunas están disponibles en Español y en muchos otros idiomas. Visite www.immunize.org/vis    1. Why get vaccinated? Influenza (flu) is a contagious disease that spreads around the [place] every year, usually between October and May. Flu is caused by influenza viruses, and is spread mainly by coughing, sneezing, and close contact. Anyone can get flu. Flu strikes suddenly and can last several days. Symptoms vary by age, but can include:   fever/chills   sore throat   muscle aches   fatigue   cough   headache    runny or stuffy nose    Flu can also lead to pneumonia and blood infections, and cause diarrhea and seizures in children. If you have a medical condition, such as heart or lung disease, flu can make it worse. Flu is more dangerous for some people. Infants and young children, people 72years of age and older, pregnant women, and people with certain health conditions or a weakened immune system are at greatest risk. Each year thousands of people in the [facility] die from flu, and many more are hospitalized. Flu vaccine can:   keep you from getting flu,   make flu less severe if you do get it, and   keep you from spreading flu to your family and other people. 2. Inactivated and recombinant flu vaccines    A dose of flu vaccine is recommended every flu season. Children 6 months through 6years of age may need two doses during the same flu season. Everyone else needs only one dose each flu season.        Some inactivated flu vaccines contain a very small amount of a mercury-based preservative called thimerosal. Studies have not shown thimerosal in vaccines to be harmful, but flu vaccines that do not contain thimerosal are available. There is no live flu virus in flu shots. They cannot cause the flu. There are many flu viruses, and they are always changing. Each year a new flu vaccine is made to protect against three or four viruses that are likely to cause disease in the upcoming flu season. But even when the vaccine doesnt exactly match these viruses, it may still provide some protection    Flu vaccine cannot prevent:   flu that is caused by a virus not covered by the vaccine, or   illnesses that look like flu but are not. It takes about 2 weeks for protection to develop after vaccination, and protection lasts through the flu season. 3. Some people should not get this vaccine    Tell the person who is giving you the vaccine:     If you have any severe, life-threatening allergies. If you ever had a life-threatening allergic reaction after a dose of flu vaccine, or have a severe allergy to any part of this vaccine, you may be advised not to get vaccinated. Most, but not all, types of flu vaccine contain a small amount of egg protein.  If you ever had Guillain-Barré Syndrome (also called GBS). Some people with a history of GBS should not get this vaccine. This should be discussed with your doctor.  If you are not feeling well. It is usually okay to get flu vaccine when you have a mild illness, but you might be asked to come back when you feel better. 4. Risks of a vaccine reaction    With any medicine, including vaccines, there is a chance of reactions. These are usually mild and go away on their own, but serious reactions are also possible. Most people who get a flu shot do not have any problems with it.      Minor problems following a flu shot include:    soreness, redness, or swelling where the shot was given     hoarseness   sore, red or itchy eyes   cough   fever   aches   headache   itching   fatigue  If these problems occur, they usually begin soon after the shot and last 1 or 2 days. More serious problems following a flu shot can include the following:     There may be a small increased risk of Guillain-Barré Syndrome (GBS) after inactivated flu vaccine. This risk has been estimated at 1 or 2 additional cases per million people vaccinated. This is much lower than the risk of severe complications from flu, which can be prevented by flu vaccine.  Young children who get the flu shot along with pneumococcal vaccine (PCV13) and/or DTaP vaccine at the same time might be slightly more likely to have a seizure caused by fever. Ask your doctor for more information. Tell your doctor if a child who is getting flu vaccine has ever had a seizure. Problems that could happen after any injected vaccine:      People sometimes faint after a medical procedure, including vaccination. Sitting or lying down for about 15 minutes can help prevent fainting, and injuries caused by a fall. Tell your doctor if you feel dizzy, or have vision changes or ringing in the ears.  Some people get severe pain in the shoulder and have difficulty moving the arm where a shot was given. This happens very rarely.  Any medication can cause a severe allergic reaction. Such reactions from a vaccine are very rare, estimated at about 1 in a million doses, and would happen within a few minutes to a few hours after the vaccination. As with any medicine, there is a very remote chance of a vaccine causing a serious injury or death. The safety of vaccines is always being monitored. For more information, visit: www.cdc.gov/vaccinesafety/    5. What if there is a serious reaction? What should I look for?  Look for anything that concerns you, such as signs of a severe allergic reaction, very high fever, or unusual behavior.     Signs of a severe allergic reaction can include hives, swelling of the face and throat, difficulty breathing, a fast heartbeat, dizziness, and weakness - usually within a few minutes to a few hours after the vaccination. What should I do?  If you think it is a severe allergic reaction or other emergency that cant wait, call 9-1-1 and get the person to the nearest hospital. Otherwise, call your doctor.  Reactions should be reported to the Vaccine Adverse Event Reporting System (VAERS). Your doctor should file this report, or you can do it yourself through  the VAERS web site at www.vaers. [facility].gov, or by calling 1-879.984.7775. VAERS does not give medical advice. 6. The National Vaccine Injury Compensation Program    The [facility] Vaccine Injury Compensation Program (VICP) is a federal program that was created to compensate people who may have been injured by certain vaccines. Persons who believe they may have been injured by a vaccine can learn about the program and about filing a claim by calling 3-181.998.3841 or visiting the [employer] website at www.[facility].gov/vaccinecompensation. There is a time limit to file a claim for compensation. 7. How can I learn more?  Ask your healthcare provider. He or she can give you the vaccine package insert or suggest other sources of information.  Call your local or state health department.  Contact the Centers for Disease Control and Prevention (CDC):  - Call 8-567.127.7002 (1-800-CDC-INFO) or  - Visit CDCs website at www.cdc.gov/flu    Vaccine Information Statement   Inactivated Influenza Vaccine   8/7/2015  [address] DAMIEN Boyd Ammon [id number]    Department of Health and Human Services  Centers for Disease Control and Prevention    Office Use Only       A Healthy Lifestyle: Care Instructions  Your Care Instructions    A healthy lifestyle can help you feel good, stay at a healthy weight, and have plenty of energy for both work and play. A healthy lifestyle is something you can share with your whole family.   A healthy lifestyle also can lower your risk for serious health problems, such as high blood pressure, heart disease, and diabetes. You can follow a few steps listed below to improve your health and the health of your family. Follow-up care is a key part of your treatment and safety. Be sure to make and go to all appointments, and call your doctor if you are having problems. It's also a good idea to know your test results and keep a list of the medicines you take. How can you care for yourself at home? · Do not eat too much sugar, fat, or fast foods. You can still have dessert and treats now and then. The goal is moderation. · Start small to improve your eating habits. Pay attention to portion sizes, drink less juice and soda pop, and eat more fruits and vegetables. ? Eat a healthy amount of food. A 3-ounce serving of meat, for example, is about the size of a deck of cards. Fill the rest of your plate with vegetables and whole grains. ? Limit the amount of soda and sports drinks you have every day. Drink more water when you are thirsty. ? Eat at least 5 servings of fruits and vegetables every day. It may seem like a lot, but it is not hard to reach this goal. A serving or helping is 1 piece of fruit, 1 cup of vegetables, or 2 cups of leafy, raw vegetables. Have an apple or some carrot sticks as an afternoon snack instead of a candy bar. Try to have fruits and/or vegetables at every meal.  · Make exercise part of your daily routine. You may want to start with simple activities, such as walking, bicycling, or slow swimming. Try to be active 30 to 60 minutes every day. You do not need to do all 30 to 60 minutes all at once. For example, you can exercise 3 times a day for 10 or 20 minutes. Moderate exercise is safe for most people, but it is always a good idea to talk to your doctor before starting an exercise program.  · Keep moving. Onnie Lisa the lawn, work in the garden, or hopTo. Take the stairs instead of the elevator at work. · If you smoke, quit.  People who smoke have an increased risk for heart attack, stroke, cancer, and other lung illnesses. Quitting is hard, but there are ways to boost your chance of quitting tobacco for good. ? Use nicotine gum, patches, or lozenges. ? Ask your doctor about stop-smoking programs and medicines. ? Keep trying. In addition to reducing your risk of diseases in the future, you will notice some benefits soon after you stop using tobacco. If you have shortness of breath or asthma symptoms, they will likely get better within a few weeks after you quit. · Limit how much alcohol you drink. Moderate amounts of alcohol (up to 2 drinks a day for men, 1 drink a day for women) are okay. But drinking too much can lead to liver problems, high blood pressure, and other health problems. Family health  If you have a family, there are many things you can do together to improve your health. · Eat meals together as a family as often as possible. · Eat healthy foods. This includes fruits, vegetables, lean meats and dairy, and whole grains. · Include your family in your fitness plan. Most people think of activities such as jogging or tennis as the way to fitness, but there are many ways you and your family can be more active. Anything that makes you breathe hard and gets your heart pumping is exercise. Here are some tips:  ? Walk to do errands or to take your child to school or the bus.  ? Go for a family bike ride after dinner instead of watching TV. Where can you learn more? Go to http://chelsie-karin.info/. Enter G967 in the search box to learn more about \"A Healthy Lifestyle: Care Instructions. \"  Current as of: December 7, 2017  Content Version: 11.8  © 0101-1411 [employer]. Care instructions adapted under license by [facility] (which disclaims liability or warranty for this information).  If you have questions about a medical condition or this instruction, always ask your healthcare professional. Zbigniew Stuart disclaims any warranty or liability for your use of this information.

## 2018-11-05 NOTE — PROGRESS NOTES
Chief Complaint   Patient presents with    Diabetes    Hypertension    Cholesterol Problem    Results     Patient is not fasting. Patient in room # 6.     1. Have you been to the ER, urgent care clinic since your last visit? Hospitalized since your last visit? No    2. Have you seen or consulted any other health care providers outside of the [address] [place] [address] since your last visit? Include any pap smears or colon screening. No    HM Reviewed. Flu due    Verbal order for in office flu injection per Denyce Collet, PA/Chiquita Olvera LPN      Kavita Coleman is a 43 y.o. male who presents for routine immunizations. He denies any symptoms , reactions or allergies that would exclude them from being immunized today. Risks and adverse reactions were discussed and the VIS was given to them. All questions were addressed. He was observed for 10 min post injection. There were no reactions observed.     Rodriguez Espinosa LPN

## 2018-11-23 NOTE — PROGRESS NOTES
Chief Complaint   Patient presents with    Diabetes    Elevated Blood Pressure     Visit Vitals    /80 (BP 1 Location: Right arm, BP Patient Position: Sitting)    Pulse 94    Temp 98.9 °F (37.2 °C) (Oral)    Resp 12    Ht 5' 9\" (1.753 m)    Wt 221 lb 3.2 oz (100.3 kg)    SpO2 95%    BMI 32.67 kg/m2     Patient is not fasting. Patient in room #6.     1. Have you been to the ER, urgent care clinic since your last visit? Hospitalized since your last visit? No    2. Have you seen or consulted any other health care providers outside of the [address] [place] [address] since your last visit? Include any pap smears or colon screening. No     Reviewed. Eye Exam, 08/2018, [address] Discussion/Summary   CBC no anemia   CMP mildly high liver test but better, mildly abnormal kidney test as previously   ESR normal   Avoid all NSAIDs due to mild renal insufficiency but stable   Continue Enbrel        Verified Results  COMP METABOLIC PANEL WITH CBCA (CPNL,CBCA) [id number] 12:17PM Sidra Esquivel     Test Name Result Flag Reference   FASTING STATUS NF hrs     SODIUM 140 mmol/L  135-145   POTASSIUM 4.1 mmol/L  3.4-5.1   CHLORIDE 104 mmol/L     CARBON DIOXIDE 23 mmol/L  21-32   ANION GAP 17 mmol/L  10-20   GLUCOSE 90 mg/dl  65-99   BUN 18 mg/dl  6-20   CREATININE 1.28 mg/dl H 0.67-1.17   GFR EST.  AMER 78     eGFR 60 - 89 mL/min/1.73m2 = Mild decrease in kidney function. GFR EST. NONAFRI AMER 67     eGFR 60 - 89 mL/min/1.73m2 = Mild decrease in kidney function.    BUN/CREATININE RATIO 14  7-25   CALCIUM 10.7 mg/dl H 8.4-10.2   BILIRUBIN TOTAL 0.6 mg/dl  0.2-1.0   GOT/AST 59 Units/L H <38   GPT/ALT 18 Units/L  <79   ALKALINE PHOSPHATASE 59 Units/L     TOTAL PROTEIN 7.8 g/dl  6.4-8.2   ALBUMIN 4.3 g/dl  3.6-5.1   GLOBULIN (CALCULATED) 3.5 g/dl  2.0-4.0   A/G RATIO 1.2  1.0-2.4   WHITE BLOOD COUNT 6.9 K/mcL  4.2-11.0   RED CELL COUNT 5.06 mil/mcL  4.50-5.90   HEMOGLOBIN 16.7 g/dl  13.0-17.0   HEMATOCRIT 45.9 %  39.0-51.0   MEAN CORPUSCULAR VOLUME 90.7 fL  78.0-100.0   MEAN CORPUSCULAR HEMOGLOBIN 33.0 pg  26.0-34.0   MEAN CORPUSCULAR HGB CONC 36.4 g/dl  32.0-36.5   RDW-CV 12.1 %  11.0-15.0   PLATELET COUNT 589 K/mcL  140-450   DIFF TYPE      AUTOMATED DIFFERENTIAL   TAYLOR% 54 %     LYM% 37 %     MON% 6 %     EOS% 3 %     BASO% 0 %     TAYLOR ABS 3.7 K/mcL  1.8-7.7   LYM ABS 2.6 K/mcL  1.0-4.8   MON ABS 0.4 K/mcL  0.3-0.9   EOS ABS 0.2 K/mcL  0.1-0.5   BASO ABS 0.0 K/mcL  0.0-0.3 RBC SED RATE [id number] 12:17PM Peggyann Every     Test Name Result Flag Reference   RBC SED RATE 17 mm/hr  0-20

## 2018-11-28 ENCOUNTER — TELEPHONE (OUTPATIENT)
Dept: FAMILY MEDICINE CLINIC | Age: 38
End: 2018-11-28

## 2018-11-28 NOTE — TELEPHONE ENCOUNTER
Called home number. Verified name and . Spoke with patient. Notified of concern with [facility] order for CPAP supplies. Notified patient of appointment needed. Patient was offered an accepted an appointment for 2018. Patient had no further questions or concerns.

## 2018-12-05 ENCOUNTER — OFFICE VISIT (OUTPATIENT)
Dept: FAMILY MEDICINE CLINIC | Age: 38
End: 2018-12-05

## 2018-12-05 VITALS
HEART RATE: 76 BPM | HEIGHT: 69 IN | OXYGEN SATURATION: 98 % | DIASTOLIC BLOOD PRESSURE: 80 MMHG | BODY MASS INDEX: 29.92 KG/M2 | RESPIRATION RATE: 16 BRPM | TEMPERATURE: 98 F | WEIGHT: 202 LBS | SYSTOLIC BLOOD PRESSURE: 116 MMHG

## 2018-12-05 DIAGNOSIS — G47.33 OBSTRUCTIVE SLEEP APNEA: Primary | ICD-10-CM

## 2018-12-05 DIAGNOSIS — R06.83 SNORING: ICD-10-CM

## 2018-12-05 NOTE — PATIENT INSTRUCTIONS
Learning About CPAP for Sleep Apnea  What is CPAP? CPAP is a small machine that you use at home every night while you sleep. It increases air pressure in your throat to keep your airway open. When you have sleep apnea, this can help you sleep better so you feel much better. CPAP stands for \"continuous positive airway pressure. \"  The CPAP machine will have one of the following:  · A mask that covers your nose and mouth  · Prongs that fit into your nose  · A mask that covers your nose only, the most common type. This type is called NCPAP. The N stands for \"nasal.\"  Why is it done? CPAP is usually the best treatment for obstructive sleep apnea. It is the first treatment choice and the most widely used. Your doctor may suggest CPAP if you have:  · Moderate to severe sleep apnea. · Sleep apnea and coronary artery disease (CAD). · Sleep apnea and heart failure. How does it help? · CPAP can help you have more normal sleep, so you feel less sleepy and more alert during the daytime. · CPAP may help keep heart failure or other heart problems from getting worse. · CPAP may help lower your blood pressure. · If you use CPAP, your bed partner may also sleep better because you are not snoring or restless. What are the side effects? Some people who use CPAP have:  · A dry or stuffy nose and a sore throat. · Irritated skin on the face. · Sore eyes. · Bloating. If you have any of these problems, work with your doctor to fix them. Here are some things you can try:  · Be sure the mask or nasal prongs fit well. · See if your doctor can adjust the pressure of your CPAP. · If your nose is dry, try a humidifier. · If your nose is runny or stuffy, try decongestant medicine or a steroid nasal spray. Be safe with medicines. Read and follow all instructions on the label. Do not use the medicine longer than the label says. If these things do not help, you might try a different type of machine.  Some machines have air pressure that adjusts on its own. Others have air pressures that are different when you breathe in than when you breathe out. This may reduce discomfort caused by too much pressure in your nose. Where can you learn more? Go to http://chelsie-karin.info/. Enter Q964 in the search box to learn more about \"Learning About CPAP for Sleep Apnea. \"  Current as of: December 6, 2017  Content Version: 11.8  © 8751-6957 [employer]. Care instructions adapted under license by [employer] (which disclaims liability or warranty for this information). If you have questions about a medical condition or this instruction, always ask your healthcare professional. [place] [address] any warranty or liability for your use of this information. Snoring: Care Instructions  Your Care Instructions  Snoring is a noise that you may make while breathing during sleep. You snore when the flow of air from your mouth or nose to your lungs makes the tissues of your throat vibrate while you sleep. This usually is caused by a blockage or narrowing in your nose, mouth, or throat (airway). Snoring can be soft, loud, raspy, harsh, hoarse, or fluttering. Your bed partner may notice that you sleep with your mouth open and that you are restless while sleeping. If snoring interferes with your or your bed partner's sleep, either or both of you may feel tired during the day. You may be able to help reduce your snoring by making changes in your activities and in the way you sleep. Follow-up care is a key part of your treatment and safety. Be sure to make and go to all appointments, and call your doctor if you are having problems. It's also a good idea to know your test results and keep a list of the medicines you take. How can you care for yourself at home? · Lose weight, if needed. Many people who snore are overweight.  Weight loss can help reduce the narrowing of the airway and might reduce or stop snoring. · Limit the use of alcohol and medicines. Drinking a lot of alcohol or taking certain medicines, especially sleeping pills or tranquilizers, before sleep may make snoring worse. · Go to bed at the same time each night, and get plenty of sleep. You may snore more when you have not had enough sleep. · Sleep on your side. Sleeping on your side may stop snoring. Try sewing a pocket in the middle of the back of your pajama top, putting a tennis ball into the pocket, and stitching it closed. This will help keep you from sleeping on your back. · Treat breathing problems. Breathing problems caused by colds or allergies can disturb airflow. This can lead to snoring. · Use a device that helps keep your airway open during sleep. This could be a device that you put in your mouth. Other examples include strips or disks that you use on your nose. · Do not smoke. Smoking can make snoring worse. If you need help quitting, talk to your doctor about stop-smoking programs and medicines. These can increase your chances of quitting for good. · Raise the head of your bed 4 to 6 inches by putting bricks under the legs of the bed. This may prevent your tongue from falling toward the back of the throat, which can make a blocked or narrow airway worse. Putting pillows under your head will not help. When should you call for help? Watch closely for changes in your health, and be sure to contact your doctor if:    · You snore, and you feel sleepy during the day.     · Your sleeping partner or you notice that you gasp, choke, or stop breathing during sleep.     · You do not get better as expected. Where can you learn more? Go to http://chelsie-karin.info/. Enter C475 in the search box to learn more about \"Snoring: Care Instructions. \"  Current as of: December 6, 2017  Content Version: 11.8  © 3562-2053 [employer], [employer].  Care instructions adapted under license by [employer] (which disclaims liability or warranty for this information). If you have questions about a medical condition or this instruction, always ask your healthcare professional. [address] any warranty or liability for your use of this information.

## 2018-12-05 NOTE — PROGRESS NOTES
Patient: Yvette Peralta MRN: [id number]  SSN: [id number]    YOB: 1980  Age: 43 y.o. Sex: male      Date of Service: 12/5/2018   Provider: CRUZ Dominguez   Office Location:   [address] [address]. [address], [address]  Office Phone: [phone]  Office Fax: [phone]        REASON FOR VISIT:   Chief Complaint   Patient presents with    Sleep Problem     history of sleep apnea    Snoring        VITALS:   Visit Vitals  /80 (BP 1 Location: Left arm, BP Patient Position: Sitting)   Pulse 76   Temp 98 °F (36.7 °C) (Oral)   Resp 16   Ht 5' 9\" (1.753 m)   Wt 202 lb (91.6 kg)   SpO2 98%   BMI 29.83 kg/m²       MEDICATIONS:   Current Outpatient Medications   Medication Sig Dispense Refill    ibuprofen (MOTRIN) 800 mg tablet Take 1 Tab by mouth every eight (8) hours as needed for Pain (and inflammation. ). 30 Tab 0    metFORMIN (GLUCOPHAGE) 1,000 mg tablet Take 1 Tab by mouth two (2) times daily (with meals). 60 Tab 2    glipiZIDE (GLUCOTROL) 5 mg tablet Take 1 Tab by mouth two (2) times a day. 60 Tab 2    simvastatin (ZOCOR) 10 mg tablet Take 1 Tab by mouth nightly. 30 Tab 2    glucose blood VI test strips (BLOOD GLUCOSE TEST) strip Check blood glucose BID and as needed Dx: E11.9 200 Strip 11    Blood-Glucose Meter monitoring kit Check blood glucose once daily in the morning Dx: E11.9 1 Kit 0    Lancets misc Check blood glucose once daily in the morning Dx: E11.9 1 Each 11        ALLERGIES:   No Known Allergies     ACTIVE MEDICAL PROBLEMS:  Patient Active Problem List   Diagnosis Code    Type 2 diabetes mellitus without complication, without long-term current use of insulin ([facility]) E11.9    Mixed hyperlipidemia E78.2    Essential hypertension I10          HISTORY OF PRESENT ILLNESS:   Ivan Guallpa is a 43 y.o. male who presents to the office for acute care.      Here today to discuss some forms that we had received from [address] requesting CPAP supplies. I had not been previously aware that patient had a diagnosis of sleep apnea, so my nurse had asked him to come in for discussion. Patient reports that he had mentioned a history of loud snoring to his previous PCP. She referred him for a sleep study, which confirmed a diagnosis of CESILIA. He was given a CPAP machine, and used it for about a week, but couldn't tolerate it. This was about 2.5 years ago, and he has not used the CPAP since. He is not sure why we received a request for supplies, because he has repeatedly told [address] that he does not need them. Records from that sleep study are not available. All pt can remember is that it was done somewhere on [facility] in [place]. REVIEW OF SYSTEMS:  ROS not performed     PHYSICAL EXAMINATION:  Physical Exam   Constitutional: He is oriented to person, place, and time and well-developed, well-nourished, and in no distress. Cardiovascular: Normal rate, regular rhythm and normal heart sounds. Exam reveals no gallop and no friction rub. No murmur heard. Pulmonary/Chest: Effort normal and breath sounds normal. He has no wheezes. He has no rales. Neurological: He is alert and oriented to person, place, and time. Gait normal.   Skin: Skin is warm and dry. No rash noted. Psychiatric: Mood, memory and affect normal.        RESULTS:  No results found for this visit on 12/05/18. ASSESSMENT/PLAN:  Diagnoses and all orders for this visit:    1. Obstructive sleep apnea  2. Snoring  - Sleep apnea supplies not needed. If he does decide to resume CPAP therapy, I'd recommend that supplies be ordered by his sleep medicine provider as I do not have any knowledge of his settings  - We will attempt to obtain his past records  - Patient was counseled on the potential long term consequences of untreated sleep apnea and was encouraged to give the CPAP another try      All questions answered.  Patient expresses understanding and agrees with the plan as detailed above. A total of 15 minutes was spent with the patient, of which >50% was spent counseling on importance of treating sleep apnea. Follow-up Disposition:  Return in about 2 months (around 2/5/2019) for routine care.        PATIENT CARE TEAM:   Patient Care Team:  CRUZ Lilly as PCP - General (Physician Assistant)  Eldon Oh   December 5, 2018   1:42 PM

## 2019-03-06 ENCOUNTER — OFFICE VISIT (OUTPATIENT)
Dept: FAMILY MEDICINE CLINIC | Age: 39
End: 2019-03-06

## 2019-03-06 VITALS
RESPIRATION RATE: 16 BRPM | SYSTOLIC BLOOD PRESSURE: 114 MMHG | WEIGHT: 217.4 LBS | DIASTOLIC BLOOD PRESSURE: 82 MMHG | OXYGEN SATURATION: 97 % | TEMPERATURE: 98.2 F | HEART RATE: 68 BPM | BODY MASS INDEX: 32.2 KG/M2 | HEIGHT: 69 IN

## 2019-03-06 DIAGNOSIS — E78.2 MIXED HYPERLIPIDEMIA: ICD-10-CM

## 2019-03-06 DIAGNOSIS — Z23 NEED FOR PNEUMOCOCCAL VACCINATION: ICD-10-CM

## 2019-03-06 DIAGNOSIS — G47.33 OBSTRUCTIVE SLEEP APNEA: ICD-10-CM

## 2019-03-06 DIAGNOSIS — E66.9 OBESITY (BMI 30-39.9): ICD-10-CM

## 2019-03-06 DIAGNOSIS — I10 ESSENTIAL HYPERTENSION: ICD-10-CM

## 2019-03-06 DIAGNOSIS — E11.9 TYPE 2 DIABETES MELLITUS WITHOUT COMPLICATION, WITHOUT LONG-TERM CURRENT USE OF INSULIN (HCC): Primary | ICD-10-CM

## 2019-03-06 LAB — HBA1C MFR BLD HPLC: 7.6 %

## 2019-03-06 RX ORDER — IBUPROFEN 800 MG/1
800 TABLET ORAL
Qty: 30 TAB | Refills: 0 | Status: SHIPPED | OUTPATIENT
Start: 2019-03-06 | End: 2019-08-14 | Stop reason: SDUPTHER

## 2019-03-06 RX ORDER — GLIPIZIDE 5 MG/1
5 TABLET ORAL 2 TIMES DAILY
Qty: 60 TAB | Refills: 2 | Status: SHIPPED | OUTPATIENT
Start: 2019-03-06 | End: 2019-05-24

## 2019-03-06 RX ORDER — SIMVASTATIN 10 MG/1
10 TABLET, FILM COATED ORAL
Qty: 30 TAB | Refills: 2 | Status: SHIPPED | OUTPATIENT
Start: 2019-03-06 | End: 2019-06-12 | Stop reason: SDUPTHER

## 2019-03-06 RX ORDER — METFORMIN HYDROCHLORIDE 1000 MG/1
1000 TABLET ORAL 2 TIMES DAILY WITH MEALS
Qty: 60 TAB | Refills: 2 | Status: CANCELLED | OUTPATIENT
Start: 2019-03-06

## 2019-03-06 RX ORDER — METFORMIN HYDROCHLORIDE 1000 MG/1
1000 TABLET, FILM COATED, EXTENDED RELEASE ORAL DAILY
Qty: 30 TAB | Refills: 2 | Status: SHIPPED | OUTPATIENT
Start: 2019-03-06 | End: 2019-03-26

## 2019-03-06 NOTE — PATIENT INSTRUCTIONS
Pneumococcal Polysaccharide Vaccine: What You Need to Know  Why get vaccinated? Vaccination can protect older adults (and some children and younger adults) from pneumococcal disease. Pneumococcal disease is caused by bacteria that can spread from person to person through close contact. It can cause ear infections, and it can also lead to more serious infections of the:  · Lungs (pneumonia),  · Blood (bacteremia), and  · Covering of the brain and spinal cord (meningitis). Meningitis can cause deafness and brain damage, and it can be fatal.  Anyone can get pneumococcal disease, but children under 3years of age, people with certain medical conditions, adults over 72years of age, and cigarette smokers are at the highest risk. About 18,000 older adults die each year from pneumococcal disease in the [place]. Treatment of pneumococcal infections with penicillin and other drugs used to be more effective. But some strains of the disease have become resistant to these drugs. This makes prevention of the disease, through vaccination, even more important. Pneumococcal polysaccharide vaccine (PPSV23)  Pneumococcal polysaccharide vaccine (PPSV23) protects against 23 types of pneumococcal bacteria. It will not prevent all pneumococcal disease. PPSV23 is recommended for:  · All adults 72years of age and older,  · Anyone 2 through 59years of age with certain long-term health problems,  · Anyone 2 through 59years of age with a weakened immune system,  · Adults 23 through 59years of age who smoke cigarettes or have asthma. Most people need only one dose of PPSV. A second dose is recommended for certain high-risk groups. People 72 and older should get a dose even if they have gotten one or more doses of the vaccine before they turned 65. Your healthcare provider can give you more information about these recommendations. Most healthy adults develop protection within 2 to 3 weeks of getting the shot.   Some people should not get this vaccine  · Anyone who has had a life-threatening allergic reaction to PPSV should not get another dose. · Anyone who has a severe allergy to any component of PPSV should not receive it. Tell your provider if you have any severe allergies. · Anyone who is moderately or severely ill when the shot is scheduled may be asked to wait until they recover before getting the vaccine. Someone with a mild illness can usually be vaccinated. · Children less than 3years of age should not receive this vaccine. · There is no evidence that PPSV is harmful to either a pregnant woman or to her fetus. However, as a precaution, women who need the vaccine should be vaccinated before becoming pregnant, if possible. Risks of a vaccine reaction  With any medicine, including vaccines, there is a chance of side effects. These are usually mild and go away on their own, but serious reactions are also possible. About half of people who get PPSV have mild side effects, such as redness or pain where the shot is given, which go away within about two days. Less than 1 out of 100 people develop a fever, muscle aches, or more severe local reactions. Problems that could happen after any vaccine:  · People sometimes faint after a medical procedure, including vaccination. Sitting or lying down for about 15 minutes can help prevent fainting, and injuries caused by a fall. Tell your doctor if you feel dizzy, or have vision changes or ringing in the ears. · Some people get severe pain in the shoulder and have difficulty moving the arm where a shot was given. This happens very rarely. · Any medication can cause a severe allergic reaction. Such reactions from a vaccine are very rare, estimated at about 1 in a million doses, and would happen within a few minutes to a few hours after the vaccination. As with any medicine, there is a very remote chance of a vaccine causing a serious injury or death.   The safety of vaccines is always being monitored. For more information, visit: www.cdc.gov/vaccinesafety/  What if there is a serious reaction? What should I look for? Look for anything that concerns you, such as signs of a severe allergic reaction, very high fever, or unusual behavior. Signs of a severe allergic reaction can include hives, swelling of the face and throat, difficulty breathing, a fast heartbeat, dizziness, and weakness. These would usually start a few minutes to a few hours after the vaccination. What should I do? If you think it is a severe allergic reaction or other emergency that can't wait, call 9-1-1 or get to the nearest hospital. Otherwise, call your doctor. Afterward, the reaction should be reported to the Vaccine Adverse Event Reporting System (VAERS). Your doctor might file this report, or you can do it yourself through the VAERS web site at www.vaers. [employer].gov, or by calling 8-785.392.5841. VAERS does not give medical advice. How can I learn more? · Ask your doctor. He or she can give you the vaccine package insert or suggest other sources of information. · Call your local or state health department. · Contact the Centers for Disease Control and Prevention (CDC):  ? Call 5-406.881.2649 (1-800-CDC-INFO) or  ? Visit CDC's website at www.cdc.gov/vaccines  Vaccine Information Statement  PPSV Vaccine  (04/24/2015)  Department of Health and Human Services  Centers for Disease Control and Prevention  Many Vaccine Information Statements are available in [place] and other languages. See www.immunize.org/vis. Hojas de información Sobre Vacunas están disponibles en español y en muchos otros idiomas. Visite Nidia.si. Care instructions adapted under license by [employer] (which disclaims liability or warranty for this information).  If you have questions about a medical condition or this instruction, always ask your healthcare professional. [place] any warranty or liability for your use of this information.

## 2019-03-06 NOTE — PROGRESS NOTES
1. Have you been to the ER, urgent care clinic since your last visit? Hospitalized since your last visit? No    2. Have you seen or consulted any other health care providers outside of the [address] [place] [address] since your last visit? Include any pap smears or colon screening. No    Patient states he has not had a pneumonia vaccine.

## 2019-03-06 NOTE — PROGRESS NOTES
Patient: Ifeoma Payne MRN: [id number]  SSN: [id number]    YOB: 1980  Age: 43 y.o. Sex: male      Date of Service: 3/6/2019   Provider: CRUZ Fishman   Office Location:   [address] [place] [address] Dr Vince le, [address]  Office Phone: [phone]  Office Fax: [phone]      REASON FOR VISIT:   Chief Complaint   Patient presents with    Diabetes    Hypertension    Cholesterol Problem        VITALS:   Visit Vitals  /82 (BP 1 Location: Left arm, BP Patient Position: Sitting)   Pulse 68   Temp 98.2 °F (36.8 °C)   Resp 16   Ht 5' 9\" (1.753 m)   Wt 217 lb 6.4 oz (98.6 kg)   SpO2 97%   BMI 32.10 kg/m²        MEDICATIONS:   Current Outpatient Medications on File Prior to Visit   Medication Sig Dispense Refill    ibuprofen (MOTRIN) 800 mg tablet Take 1 Tab by mouth every eight (8) hours as needed for Pain (and inflammation. ). 30 Tab 0    glucose blood VI test strips (BLOOD GLUCOSE TEST) strip Check blood glucose BID and as needed Dx: E11.9 200 Strip 11    Blood-Glucose Meter monitoring kit Check blood glucose once daily in the morning Dx: E11.9 1 Kit 0    Lancets misc Check blood glucose once daily in the morning Dx: E11.9 1 Each 11    metFORMIN (GLUCOPHAGE) 1,000 mg tablet Take 1 Tab by mouth two (2) times daily (with meals). 60 Tab 2    glipiZIDE (GLUCOTROL) 5 mg tablet Take 1 Tab by mouth two (2) times a day. 60 Tab 2    simvastatin (ZOCOR) 10 mg tablet Take 1 Tab by mouth nightly. 30 Tab 2     No current facility-administered medications on file prior to visit. ALLERGIES:   No Known Allergies     MEDICAL/SURGICAL HISTORY:  Past Medical History:   Diagnosis Date    Hyperlipidemia     Sleep apnea     Type 2 diabetes mellitus ([address]       No past surgical history on file.      FAMILY HISTORY:  Family History   Problem Relation Age of Onset    Hypertension Mother     High Cholesterol Mother     Diabetes Father    [facility] Hypertension Father     High Cholesterol Father     Cancer Mother         SOCIAL HISTORY:  Social History     Tobacco Use    Smoking status: Never Smoker    Smokeless tobacco: Never Used   Substance Use Topics    Alcohol use: Yes     Alcohol/week: 1.8 oz     Types: 3 Cans of beer per week     Comment: rarely    Drug use: No             HISTORY OF PRESENT ILLNESS: Ovidio Flynn is a 43 y.o. male who presents to the office for a routine follow up visit.        Diabetes -  Currently the patient's condition is improved, though still not quite at goal   Last A1c: 8.0% on 11/3/18  Today's A1c: 7.6%  Reports compliance with metformin and glipizide, however he is still only taking them once daily due to side effects of diarrhea. Home glucose readings: AM fasting generally 140s-160s. Ran out of test strips 2 weeks ago. He had successfully lost some weight through dietary changes a few months ago, but unfortunately has gained most of it back. Last urine microalbumin: 7/23/18, no microalbuminuria noted. Last foot exam: 7/23/18  Last eye exam: 8/16/18, no signs of retinopathy.      Hypertension -   Currently, the patient's condition is well controlled . He is not taking the lisinopril. Home BP readings: not checking      Hyperlipidemia -    Labs from 11/3/18 revealed , HDL 32, trig 227. It was discovered at that time that he had stopped taking his Zocor. He still has not resumed it. He states he has a hard time remembering to take meds at bedtime     CESILIA -   Prior history. Not using CPAP. Patient states he could not tolerate it and is not interested in further sleep studies at this time. REVIEW OF SYSTEMS:  Review of Systems   Constitutional: Negative for chills, fever and malaise/fatigue. Respiratory: Negative for cough, shortness of breath and wheezing. Cardiovascular: Negative for chest pain, palpitations and leg swelling. Gastrointestinal: Positive for diarrhea.  Negative for abdominal pain, constipation, nausea and vomiting. PHYSICAL EXAMINATION:  Physical Exam   Constitutional: He is oriented to person, place, and time and well-developed, well-nourished, and in no distress. Cardiovascular: Normal rate, regular rhythm and normal heart sounds. Exam reveals no gallop and no friction rub. No murmur heard. Pulmonary/Chest: Effort normal and breath sounds normal. He has no wheezes. He has no rales. Neurological: He is alert and oriented to person, place, and time. Gait normal.   Skin: Skin is warm and dry. No rash noted. Psychiatric: Mood, memory and affect normal.        RESULTS:  Results for orders placed or performed in visit on 03/06/19   AMB POC HEMOGLOBIN A1C   Result Value Ref Range    Hemoglobin A1c (POC) 7.6 %        ASSESSMENT/PLAN:  Diagnoses and all orders for this visit:    1. Type 2 diabetes mellitus without complication, without long-term current use of insulin (HCC)  - Will switch to extended release metformin to see if this improves GI side effects  - Continue glipizide   - Continue to work on diabetic diet  - Repeat labs in 3 mo  Orders:    -     glucose blood VI test strips (BLOOD GLUCOSE TEST) strip; Check blood glucose BID and as needed Dx: E11.9  -     glipiZIDE (GLUCOTROL) 5 mg tablet; Take 1 Tab by mouth two (2) times a day. -     METABOLIC PANEL, COMPREHENSIVE; Future  -     LIPID PANEL; Future  -     HEMOGLOBIN A1C W/O EAG; Future  -     metFORMIN (GLUMETZA) 1,000 mg TG24 24 hour tablet; Take 1,000 mg by mouth daily. 2. Essential hypertension  - BP at goal  - Continue current regimen     3. Mixed hyperlipidemia  - Encouraged patient to resume his statin  - Recheck lipids in 3 mo  Orders:    -     simvastatin (ZOCOR) 10 mg tablet; Take 1 Tab by mouth nightly. 4. Obstructive sleep apnea  - Patient refuses referral for sleep study  - Will monitor     5.  Obesity (BMI 30-39.9)  - Encouraged continued efforts with diet and exercise       Pneumovax 23 administered today with patient's consent. VIS provided. Follow-up Disposition: Not on File       All questions answered. Patient expresses understanding and agrees with the plan as detailed above.     PATIENT CARE TEAM:   Patient Care Team:  CRUZ Olmos as PCP - General (Physician Assistant)  None       Eldon West   March 6, 2019   1:50 PM

## 2019-06-12 ENCOUNTER — OFFICE VISIT (OUTPATIENT)
Dept: FAMILY MEDICINE CLINIC | Age: 39
End: 2019-06-12

## 2019-06-12 ENCOUNTER — HOSPITAL ENCOUNTER (OUTPATIENT)
Dept: LAB | Age: 39
Discharge: HOME | End: 2019-06-12
Payer: COMMERCIAL

## 2019-06-12 VITALS
SYSTOLIC BLOOD PRESSURE: 128 MMHG | BODY MASS INDEX: 31.84 KG/M2 | TEMPERATURE: 98.7 F | DIASTOLIC BLOOD PRESSURE: 80 MMHG | HEART RATE: 77 BPM | OXYGEN SATURATION: 98 % | WEIGHT: 215 LBS | RESPIRATION RATE: 16 BRPM | HEIGHT: 69 IN

## 2019-06-12 DIAGNOSIS — I10 ESSENTIAL HYPERTENSION: ICD-10-CM

## 2019-06-12 DIAGNOSIS — E66.9 OBESITY (BMI 30-39.9): ICD-10-CM

## 2019-06-12 DIAGNOSIS — E78.2 MIXED HYPERLIPIDEMIA: ICD-10-CM

## 2019-06-12 DIAGNOSIS — E11.9 TYPE 2 DIABETES MELLITUS WITHOUT COMPLICATION, WITHOUT LONG-TERM CURRENT USE OF INSULIN (HCC): ICD-10-CM

## 2019-06-12 DIAGNOSIS — G47.33 OBSTRUCTIVE SLEEP APNEA: ICD-10-CM

## 2019-06-12 LAB
ALBUMIN SERPL-MCNC: 4.4 G/DL (ref 3.4–5)
ALBUMIN/GLOB SERPL: 1.3 {RATIO} (ref 0.8–1.7)
ALP SERPL-CCNC: 88 U/L (ref 45–117)
ALT SERPL-CCNC: 34 U/L (ref 16–61)
ANION GAP SERPL CALC-SCNC: 5 MMOL/L (ref 3–18)
AST SERPL-CCNC: 10 U/L (ref 15–37)
BILIRUB SERPL-MCNC: 0.4 MG/DL (ref 0.2–1)
BUN SERPL-MCNC: 14 MG/DL (ref 7–18)
BUN/CREAT SERPL: 12 (ref 12–20)
CALCIUM SERPL-MCNC: 9.6 MG/DL (ref 8.5–10.1)
CHLORIDE SERPL-SCNC: 103 MMOL/L (ref 100–108)
CHOLEST SERPL-MCNC: 260 MG/DL
CO2 SERPL-SCNC: 29 MMOL/L (ref 21–32)
CREAT SERPL-MCNC: 1.2 MG/DL (ref 0.6–1.3)
GLOBULIN SER CALC-MCNC: 3.3 G/DL (ref 2–4)
GLUCOSE SERPL-MCNC: 328 MG/DL (ref 74–99)
HBA1C MFR BLD: 10.1 % (ref 4.2–5.6)
HDLC SERPL-MCNC: 27 MG/DL (ref 40–60)
HDLC SERPL: 9.6 {RATIO} (ref 0–5)
LDLC SERPL CALC-MCNC: ABNORMAL MG/DL (ref 0–100)
LIPID PROFILE,FLP: ABNORMAL
POTASSIUM SERPL-SCNC: 4.3 MMOL/L (ref 3.5–5.5)
PROT SERPL-MCNC: 7.7 G/DL (ref 6.4–8.2)
SODIUM SERPL-SCNC: 137 MMOL/L (ref 136–145)
TRIGL SERPL-MCNC: 639 MG/DL (ref ?–150)
VLDLC SERPL CALC-MCNC: ABNORMAL MG/DL

## 2019-06-12 PROCEDURE — 80061 LIPID PANEL: CPT

## 2019-06-12 PROCEDURE — 80053 COMPREHEN METABOLIC PANEL: CPT

## 2019-06-12 PROCEDURE — 83036 HEMOGLOBIN GLYCOSYLATED A1C: CPT

## 2019-06-12 PROCEDURE — 36415 COLL VENOUS BLD VENIPUNCTURE: CPT

## 2019-06-12 RX ORDER — SIMVASTATIN 10 MG/1
10 TABLET, FILM COATED ORAL
Qty: 90 TAB | Refills: 0 | Status: SHIPPED | OUTPATIENT
Start: 2019-06-12 | End: 2019-09-11 | Stop reason: SDUPTHER

## 2019-06-12 RX ORDER — GLIPIZIDE 5 MG/1
5 TABLET ORAL 2 TIMES DAILY
Qty: 180 TAB | Refills: 0 | Status: SHIPPED | OUTPATIENT
Start: 2019-06-12 | End: 2019-09-11 | Stop reason: SDUPTHER

## 2019-06-12 NOTE — PATIENT INSTRUCTIONS

## 2019-06-12 NOTE — PROGRESS NOTES
Patient: Loan Huitron MRN: [id number]  SSN: [id number]    YOB: 1980  Age: 43 y.o. Sex: male      Date of Service: 6/12/2019   Provider: CRUZ Rice   Office Location:   [address] [place] [address] Dr Vince le, [address] [facility] [address]  Office Phone: [phone]  Office Fax: [phone]      REASON FOR VISIT:   Chief Complaint   Patient presents with    Cholesterol Problem    Diabetes    Hypertension        VITALS:   Visit Vitals  /80 (BP 1 Location: Left arm, BP Patient Position: Sitting)   Pulse 77   Temp 98.7 °F (37.1 °C) (Oral)   Resp 16   Ht 5' 9\" (1.753 m)   Wt 215 lb (97.5 kg)   SpO2 98%   BMI 31.75 kg/m²        MEDICATIONS:   Current Outpatient Medications on File Prior to Visit   Medication Sig Dispense Refill    metFORMIN (GLUCOPHAGE) 1,000 mg tablet Take 1,000 mg by mouth two (2) times daily (with meals).  simvastatin (ZOCOR) 10 mg tablet Take 1 Tab by mouth nightly. 30 Tab 2    methocarbamol (ROBAXIN) 750 mg tablet Take 1 Tab by mouth four (4) times daily. 12 Tab 0    glucose blood VI test strips (BLOOD GLUCOSE TEST) strip Check blood glucose BID and as needed Dx: E11.9 200 Strip 11    ibuprofen (MOTRIN) 800 mg tablet Take 1 Tab by mouth every eight (8) hours as needed for Pain (and inflammation. ). 30 Tab 0    Blood-Glucose Meter monitoring kit Check blood glucose once daily in the morning Dx: E11.9 1 Kit 0    Lancets misc Check blood glucose once daily in the morning Dx: E11.9 1 Each 11     No current facility-administered medications on file prior to visit. ALLERGIES:   No Known Allergies     MEDICAL/SURGICAL HISTORY:  Past Medical History:   Diagnosis Date    Hyperlipidemia     Hypertension     Sleep apnea     Type 2 diabetes mellitus ([facility]       History reviewed. No pertinent surgical history.      FAMILY HISTORY:  Family History   Problem Relation Age of Onset    Hypertension Mother     High Cholesterol Mother  Diabetes Father     Hypertension Father     High Cholesterol Father     Cancer Mother         SOCIAL HISTORY:  Social History     Tobacco Use    Smoking status: Never Smoker    Smokeless tobacco: Never Used   Substance Use Topics    Alcohol use: Yes     Alcohol/week: 2.4 oz     Types: 4 Shots of liquor per week     Comment: rarely    Drug use: No             HISTORY OF PRESENT ILLNESS: Bruce Elliott is a 43 y.o. male who presents to the office for a routine follow up visit. Diabetes -  Currently the patient's condition is poorly controlled   Last A1c: 7.6% on 3/6/19  Today's A1c: 10.1%  At his last visit, his metformin was changed to an extended release formulation, but he still was not able to tolerate it due to side effects of GI upset and diarrhea. He is no longer taking glipizide for unclear reasons. Home glucose readings: have been in the 300s lately   Last urine microalbumin: 7/23/18, no microalbuminuria noted. Last foot exam: 7/23/18  Last eye exam: 8/16/18, no signs of retinopathy.      Hypertension -   Currently, the patient's condition is well controlled off meds. Hyperlipidemia -    Fasting lipid panel drawn this morning revealed triglycerides elevated in the 600s. LDL was unable to be calculated as a result. Patient reports compliance with Zocor 10 mg daily      CESILIA -   Prior history. Not using CPAP. Patient states he could not tolerate it and is not interested in further sleep studies at this time. REVIEW OF SYSTEMS:  Review of Systems   Constitutional: Negative for chills and fever. Respiratory: Negative for cough, shortness of breath and wheezing. Cardiovascular: Negative for chest pain, palpitations and leg swelling. Gastrointestinal: Negative for abdominal pain, constipation, diarrhea ( resolved after stopping metformin), nausea and vomiting.         PHYSICAL EXAMINATION:  Physical Exam   Constitutional: He is oriented to person, place, and time and well-developed, well-nourished, and in no distress. Cardiovascular: Normal rate, regular rhythm and normal heart sounds. Exam reveals no gallop and no friction rub. No murmur heard. Pulmonary/Chest: Effort normal and breath sounds normal. He has no wheezes. He has no rales. Neurological: He is alert and oriented to person, place, and time. Gait normal.   Skin: Skin is warm and dry. No rash noted. Psychiatric: Mood, memory and affect normal.   Diabetic Foot Exam: DP pulses 2+ symmetric, no open areas or skin breakdown noted, sensory intact to filament and positioning. Vibratory sensation not tested. Nails in good condition. RESULTS:  Lab Results   Component Value Date/Time    Sodium 137 06/12/2019 07:14 AM    Potassium 4.3 06/12/2019 07:14 AM    Chloride 103 06/12/2019 07:14 AM    CO2 29 06/12/2019 07:14 AM    Anion gap 5 06/12/2019 07:14 AM    Glucose 328 (H) 06/12/2019 07:14 AM    BUN 14 06/12/2019 07:14 AM    Creatinine 1.20 06/12/2019 07:14 AM    BUN/Creatinine ratio 12 06/12/2019 07:14 AM    GFR est AA >60 06/12/2019 07:14 AM    GFR est non-AA >60 06/12/2019 07:14 AM    Calcium 9.6 06/12/2019 07:14 AM    Bilirubin, total 0.4 06/12/2019 07:14 AM    AST (SGOT) 10 (L) 06/12/2019 07:14 AM    Alk. phosphatase 88 06/12/2019 07:14 AM    Protein, total 7.7 06/12/2019 07:14 AM    Albumin 4.4 06/12/2019 07:14 AM    Globulin 3.3 06/12/2019 07:14 AM    A-G Ratio 1.3 06/12/2019 07:14 AM    ALT (SGPT) 34 06/12/2019 07:14 AM      Lab Results   Component Value Date/Time    Cholesterol, total 260 (H) 06/12/2019 07:14 AM    HDL Cholesterol 27 (L) 06/12/2019 07:14 AM    LDL, calculated  06/12/2019 07:14 AM     LDL AND VLDL CHOLESTEROL NOT CALCULATED WHEN TRIGLYCERIDES >400 MG/DL OR HDL CHOLESTEROL <20 MG/DL    VLDL, calculated  06/12/2019 07:14 AM     Calculation not valid with this patient's other Lipid values.     Triglyceride 639 (H) 06/12/2019 07:14 AM    CHOL/HDL Ratio 9.6 (H) 06/12/2019 07:14 AM      Lab Results Component Value Date/Time    Hemoglobin A1c 10.1 (H) 06/12/2019 07:14 AM    Hemoglobin A1c (POC) 7.6 03/06/2019 01:51 PM        ASSESSMENT/PLAN:  Diagnoses and all orders for this visit:    1. Uncontrolled type 2 diabetes mellitus without complication (HCC)  - Dramatic increase in A1c since last visit. Patient has stopped taking all of his diabetes meds  - Will d/c metformin for good as he has not been able to tolerate this. - Explained that she should still be taking his glipizide  - Suggested adding a GLP-1 agonist like Trulicity, but patient is very wary of injections  - Will instead try Januvia for now  - Patient instructed to CALL the office if he experiences any adverse side effects or if home glucose readings are not improving with change in meds  Orders:    -     glipiZIDE (GLUCOTROL) 5 mg tablet; Take 1 Tab by mouth two (2) times a day. -     SITagliptin (JANUVIA) 50 mg tablet; Take 1 Tab by mouth daily. -      DIABETES FOOT EXAM    2. Essential hypertension  - Well controlled off medications   - will continue to monitor      3. Mixed hyperlipidemia  - Continue low dose statin for now  - Will re-check lipid panel once glucose is under better control and reassess at that time  Orders:    -     simvastatin (ZOCOR) 10 mg tablet; Take 1 Tab by mouth nightly. 4. Obstructive sleep apnea  - declines referral     5. Obesity (BMI 30-39.9)  - Continue to work on diet and weight loss    Follow-up and Dispositions    · Return in about 2 months (around 8/12/2019) for DM,Chol and POC A1C in office . All questions answered. Patient expresses understanding and agrees with the plan as detailed above.     PATIENT CARE TEAM:   Patient Care Team:  CRUZ Alvarez as PCP - General (Physician Assistant)  Stiven Bello MD (Ophthalmology)       CRUZ Lyman   June 12, 2019   2:36 PM

## 2019-06-12 NOTE — PROGRESS NOTES
1. Have you been to the ER, urgent care clinic since your last visit? Hospitalized since your last visit? Yes 05- for hands freezing and hurting     2. Have you seen or consulted any other health care providers outside of the [address] [place] [address] since your last visit? Include any pap smears or colon screening.  No         Annual eye exam:    Pneumococcal vaccine:  03-  Flu vaccine:  11-  Patient instructed to remove shoes: Yes    Blood Sugars at home axp 328 lbs

## 2019-07-08 ENCOUNTER — TELEPHONE (OUTPATIENT)
Dept: FAMILY MEDICINE CLINIC | Age: 39
End: 2019-07-08

## 2019-07-09 NOTE — TELEPHONE ENCOUNTER
Please first confirm that he is taking his medications as directed (Januvia and glipizide)  If so, would recommend scheduling follow up with me this week or next, as it is likely time to discuss injectable medication (insulin vs. a GLP-1 agonist like Trulicity or Victoza)

## 2019-07-10 NOTE — TELEPHONE ENCOUNTER
Spoke with patient. He states he only takes Januvia 50 mg. Patient has been taking Januvia 50 mg BID. Advised patient that his elevated blood sugars are the result of him taking his diabetic medications incorrectly. Patient asked if he picked up Glipizide, Januvia, and Simvastatin. He confirmed by verifying names on bottles during phone conversation. Patient advised that Januvia is to be taken ONCE daily and Glipizide is to be taken TWICE daily. Patient repeated medications and instructions back to me. He will start taking medications as prescribed tomorrow and keep blood sugar log. He will contact [facility] if he does not notice a difference in blood sugars otherwise he will follow up 8/12/19 as scheduled.

## 2019-08-14 ENCOUNTER — OFFICE VISIT (OUTPATIENT)
Dept: FAMILY MEDICINE CLINIC | Age: 39
End: 2019-08-14

## 2019-08-14 VITALS
RESPIRATION RATE: 16 BRPM | HEART RATE: 77 BPM | WEIGHT: 205 LBS | BODY MASS INDEX: 30.36 KG/M2 | HEIGHT: 69 IN | SYSTOLIC BLOOD PRESSURE: 132 MMHG | TEMPERATURE: 98.7 F | DIASTOLIC BLOOD PRESSURE: 75 MMHG | OXYGEN SATURATION: 100 %

## 2019-08-14 DIAGNOSIS — I10 ESSENTIAL HYPERTENSION: ICD-10-CM

## 2019-08-14 DIAGNOSIS — E78.2 MIXED HYPERLIPIDEMIA: ICD-10-CM

## 2019-08-14 RX ORDER — IBUPROFEN 800 MG/1
800 TABLET ORAL
Qty: 30 TAB | Refills: 0 | Status: SHIPPED | OUTPATIENT
Start: 2019-08-14 | End: 2019-11-11

## 2019-08-14 NOTE — PROGRESS NOTES
1. Have you been to the ER, urgent care clinic since your last visit? Hospitalized since your last visit? Yes August 08,2019 [facility] for dental pain     2. Have you seen or consulted any other health care providers outside of the [address] [place] [address] since your last visit? Include any pap smears or colon screening. No       Annual eye exam: 08-  Pneumococcal vaccine: 2019  Flu vaccine:  2018  Patient instructed to remove shoes:  Yes

## 2019-08-14 NOTE — PATIENT INSTRUCTIONS

## 2019-08-14 NOTE — PROGRESS NOTES
Patient: Mary Kay Fuller MRN: [id number]  SSN: [id number]    YOB: 1980  Age: 43 y.o. Sex: male      Date of Service: 8/14/2019   Provider: CRUZ Goddard   Office Location:   [address] Vince le, [address]  Office Phone: [phone]  Office Fax: [phone]      REASON FOR VISIT:   Chief Complaint   Patient presents with    Diabetes        VITALS:   Visit Vitals  /75 (BP 1 Location: Left arm, BP Patient Position: Sitting)   Pulse 77   Temp 98.7 °F (37.1 °C) (Oral)   Resp 16   Ht 5' 9\" (1.753 m)   Wt 205 lb (93 kg)   SpO2 100%   BMI 30.27 kg/m²        MEDICATIONS:   Current Outpatient Medications on File Prior to Visit   Medication Sig Dispense Refill    glipiZIDE (GLUCOTROL) 5 mg tablet Take 1 Tab by mouth two (2) times a day. 180 Tab 0    SITagliptin (JANUVIA) 50 mg tablet Take 1 Tab by mouth daily. 90 Tab 0    simvastatin (ZOCOR) 10 mg tablet Take 1 Tab by mouth nightly. 90 Tab 0    glucose blood VI test strips (BLOOD GLUCOSE TEST) strip Check blood glucose BID and as needed Dx: E11.9 200 Strip 11    Blood-Glucose Meter monitoring kit Check blood glucose once daily in the morning Dx: E11.9 1 Kit 0    Lancets misc Check blood glucose once daily in the morning Dx: E11.9 1 Each 11     No current facility-administered medications on file prior to visit. ALLERGIES:   No Known Allergies     MEDICAL/SURGICAL HISTORY:  Past Medical History:   Diagnosis Date    Hyperlipidemia     Hypertension     Sleep apnea     Type 2 diabetes mellitus ([address]       History reviewed. No pertinent surgical history.      FAMILY HISTORY:  Family History   Problem Relation Age of Onset    Hypertension Mother     High Cholesterol Mother     Diabetes Father     Hypertension Father     High Cholesterol Father     Cancer Mother         SOCIAL HISTORY:  Social History     Tobacco Use    Smoking status: Never Smoker    Smokeless tobacco: Never Used   Substance Use Topics    Alcohol use: Yes     Alcohol/week: 4.0 standard drinks     Types: 4 Shots of liquor per week     Comment: rarely    Drug use: No           HISTORY OF PRESENT ILLNESS: Marshall Victor is a 43 y.o. male who presents to the office for a routine follow up visit. Diabetes -  Currently the patient's condition is very poorly controlled and worsening   Last A1c: 10.1% on 6/12/19  Today's A1c: 12.8%  He is prescribed glipizide 5 mg BID and Januvia 50 mg daily. Betito Morrow He was unable to tolerate metformin even in the extended release formulation due to side effects of diarrhea. Compliance with BID dosing of glipizide has also been somewhat of a challenge as patient admits he often only remembers to take it once a day. Patient has not been compliant with diabetic diet. He states that lately he has been mostly eating [employer] chicken sandwiches and drinking sweet tea. He know this is probably affecting his blood sugar    Home glucose readings: almost always in the 300s   Last urine microalbumin: 7/23/18, no microalbuminuria noted.   Last foot exam: 6/12/19  Last eye exam: 8/16/18, no signs of retinopathy at that time.      Hypertension -   Currently, the patient's condition is well controlled off meds.      Hyperlipidemia -    Fasting lipid panel drawn this morning revealed triglycerides elevated in the 600s. LDL was unable to be calculated as a result. This was felt to be due to his hyperglycemia, and the plan is to repeat a lipid panel once his diabetes is under better control. Patient reports compliance with Zocor 10 mg daily.      CESILIA -   Prior history. Not using CPAP. Patient states he could not tolerate it and is not interested in further sleep studies at this time.     REVIEW OF SYSTEMS:  Review of Systems   Constitutional: Negative for chills, fever and malaise/fatigue. Respiratory: Negative for cough, shortness of breath and wheezing.     Cardiovascular: Negative for chest pain, palpitations and leg swelling. PHYSICAL EXAMINATION:  Physical Exam   Constitutional: He is oriented to person, place, and time and well-developed, well-nourished, and in no distress. Cardiovascular: Normal rate, regular rhythm and normal heart sounds. Exam reveals no gallop and no friction rub. No murmur heard. Pulmonary/Chest: Effort normal and breath sounds normal. He has no wheezes. He has no rales. Neurological: He is alert and oriented to person, place, and time. Gait normal.   Skin: Skin is warm and dry. No rash noted. Psychiatric: Mood, memory and affect normal.        RESULTS:  No results found for this visit on 08/14/19. ASSESSMENT/PLAN:  Diagnoses and all orders for this visit:    1. Uncontrolled type 2 diabetes mellitus without complication ([address]  - Poorly controlled and worsening. This can be partially attributed to dietary non-compliance. Patient requested a referral to a nutritionist to help him make better food choices, and this was ordered for him  - Will also d/c Januvia and switch to weekly Trulicity. Patient is somewhat hesitant about injectable medications, but is open-minded to once weekly dosing. He initially requested to be put back on metformin (as this had been working well) but when I asked whether he'd rather have diarrhea every day or give himself a shot once a week, he agreed to try the Trulicity. He was counseled on potential adverse side effects  - Will have him return in 4 weeks for follow up, and if doing well, will increase his dose to 1.5 mg weekly  - check urine micro at next visit   Orders:    -     dulaglutide (TRULICITY) 4.99 IO/6.5 mL sub-q pen; 0.5 mL by SubCUTAneous route every seven (7) days.  -     AMB POC HEMOGLOBIN A1C  -     REFERRAL TO NUTRITION    2. Essential hypertension  - Stable off meds. Will continue to monitor.      3. Mixed hyperlipidemia  - Will re-check lipids in a few months once diabetes is under better control  - Continue statin for now     Other orders  -     ibuprofen (MOTRIN) 800 mg tablet; Take 1 Tab by mouth every eight (8) hours as needed for Pain (and inflammation. ). Follow-up and Dispositions    · Return in about 1 month (around 9/14/2019) for diabetes and urine art in ofice . A total of 25 minutes was spent with the patient, of which >50% was spent in counseling/coordinating care regarding diabetes management. All questions answered. Patient expresses understanding and agrees with the plan as detailed above.     PATIENT CARE TEAM:   Patient Care Team:  CRUZ Hnanah as PCP - General (Physician Assistant)  Luisa Araiza MD (Ophthalmology)       CRUZ Diaz   August 14, 2019   5:10 PM

## 2019-09-11 ENCOUNTER — OFFICE VISIT (OUTPATIENT)
Dept: FAMILY MEDICINE CLINIC | Age: 39
End: 2019-09-11

## 2019-09-11 VITALS
WEIGHT: 208 LBS | RESPIRATION RATE: 16 BRPM | SYSTOLIC BLOOD PRESSURE: 122 MMHG | OXYGEN SATURATION: 98 % | TEMPERATURE: 98 F | BODY MASS INDEX: 30.81 KG/M2 | DIASTOLIC BLOOD PRESSURE: 76 MMHG | HEART RATE: 76 BPM | HEIGHT: 69 IN

## 2019-09-11 DIAGNOSIS — Z23 ENCOUNTER FOR IMMUNIZATION: ICD-10-CM

## 2019-09-11 DIAGNOSIS — E78.2 MIXED HYPERLIPIDEMIA: ICD-10-CM

## 2019-09-11 PROBLEM — E11.21 TYPE 2 DIABETES WITH NEPHROPATHY (HCC): Status: ACTIVE | Noted: 2019-09-11

## 2019-09-11 LAB
ALBUMIN UR QL STRIP: 150 MG/L
CREATININE, URINE POC: 300 MG/DL
MICROALBUMIN/CREAT RATIO POC: ABNORMAL MG/G

## 2019-09-11 RX ORDER — SIMVASTATIN 10 MG/1
10 TABLET, FILM COATED ORAL
Qty: 90 TAB | Refills: 0 | Status: SHIPPED | OUTPATIENT
Start: 2019-09-11 | End: 2019-11-11

## 2019-09-11 RX ORDER — GLIPIZIDE 5 MG/1
5 TABLET ORAL 2 TIMES DAILY
Qty: 180 TAB | Refills: 0 | Status: SHIPPED | OUTPATIENT
Start: 2019-09-11 | End: 2020-02-11 | Stop reason: SDUPTHER

## 2019-09-11 NOTE — PROGRESS NOTES
1. Have you been to the ER, urgent care clinic since your last visit? Hospitalized since your last visit? No    2. Have you seen or consulted any other health care providers outside of the [address] [place] [address] since your last visit? Include any pap smears or colon screening. No       Physician order obtained. Patient completed adult immunization consent form. Allergies, contraindications and recommendations reviewed with patient. Seasonal influenza vaccine administered IM right deltoid. Patient tolerated well. Patient remained in office for 10 minutes after injection and no adverse reactions were noted.     Lot # [id number]  Exp Date: 06-  Nati Mak # [id number]

## 2019-09-11 NOTE — PATIENT INSTRUCTIONS
Vaccine Information Statement    Influenza (Flu) Vaccine (Inactivated or Recombinant): What You Need to Know    Many Vaccine Information Statements are available in Serbian and other languages. See www.immunize.org/vis  Hojas de información sobre vacunas están disponibles en español y en muchos otros idiomas. Visite www.immunize.org/vis    1. Why get vaccinated? Influenza vaccine can prevent influenza (flu). Flu is a contagious disease that spreads around the [facility] every year, usually between October and May. Anyone can get the flu, but it is more dangerous for some people. Infants and young children, people 72years of age and older, pregnant women, and people with certain health conditions or a weakened immune system are at greatest risk of flu complications. Pneumonia, bronchitis, sinus infections and ear infections are examples of flu-related complications. If you have a medical condition, such as heart disease, cancer or diabetes, flu can make it worse. Flu can cause fever and chills, sore throat, muscle aches, fatigue, cough, headache, and runny or stuffy nose. Some people may have vomiting and diarrhea, though this is more common in children than adults. Each year thousands of people in the [facility] die from flu, and many more are hospitalized. Flu vaccine prevents millions of illnesses and flu-related visits to the doctor each year. 2. Influenza vaccines     CDC recommends everyone 10months of age and older get vaccinated every flu season. Children 6 months through 6years of age may need 2 doses during a single flu season. Everyone else needs only 1 dose each flu season. It takes about 2 weeks for protection to develop after vaccination. There are many flu viruses, and they are always changing. Each year a new flu vaccine is made to protect against three or four viruses that are likely to cause disease in the upcoming flu season.  Even when the vaccine doesnt exactly match these viruses, it may still provide some protection. Influenza vaccine does not cause flu. Influenza vaccine may be given at the same time as other vaccines. 3. Talk with your health care provider    Tell your vaccine provider if the person getting the vaccine:   Has had an allergic reaction after a previous dose of influenza vaccine, or has any severe, life-threatening allergies.  Has ever had Guillain-Barré Syndrome (also called GBS). In some cases, your health care provider may decide to postpone influenza vaccination to a future visit. People with minor illnesses, such as a cold, may be vaccinated. People who are moderately or severely ill should usually wait until they recover before getting influenza vaccine. Your health care provider can give you more information. 4. Risks of a reaction     Soreness, redness, and swelling where shot is given, fever, muscle aches, and headache can happen after influenza vaccine.  There may be a very small increased risk of Guillain-Barré Syndrome (GBS) after inactivated influenza vaccine (the flu shot). Noemi Ortega children who get the flu shot along with pneumococcal vaccine (PCV13), and/or DTaP vaccine at the same time might be slightly more likely to have a seizure caused by fever. Tell your health care provider if a child who is getting flu vaccine has ever had a seizure. People sometimes faint after medical procedures, including vaccination. Tell your provider if you feel dizzy or have vision changes or ringing in the ears. As with any medicine, there is a very remote chance of a vaccine causing a severe allergic reaction, other serious injury, or death. 5. What if there is a serious problem? An allergic reaction could occur after the vaccinated person leaves the clinic.  If you see signs of a severe allergic reaction (hives, swelling of the face and throat, difficulty breathing, a fast heartbeat, dizziness, or weakness), call 9-1-1 and get the person to the nearest hospital.    For other signs that concern you, call your health care provider. Adverse reactions should be reported to the Vaccine Adverse Event Reporting System (VAERS). Your health care provider will usually file this report, or you can do it yourself. Visit the VAERS website at www.vaers. [facility].gov or call 4-244.678.3303. VAERS is only for reporting reactions, and VAERS staff do not give medical advice. 6. The National Vaccine Injury Compensation Program    The [facility] Vaccine Injury Compensation Program (VICP) is a federal program that was created to compensate people who may have been injured by certain vaccines. Visit the VICP website at www.[facility].gov/vaccinecompensation or call 6-736.143.1579 to learn about the program and about filing a claim. There is a time limit to file a claim for compensation. 7. How can I learn more?  Ask your health care provider.  Call your local or state health department.  Contact the Centers for Disease Control and Prevention (CDC):  - Call 1-174.269.6538 (9-741-ZOF-INFO) or  - Visit CDCs influenza website at www.cdc.gov/flu    Vaccine Information Statement (Interim)  Inactivated Influenza Vaccine   8/15/2019  42 DAMIEN Martins [id number]-[id number]   Department of Health and Human Services  Centers for Disease Control and Prevention    Office Use Only

## 2019-09-11 NOTE — PROGRESS NOTES
Patient: Gena Baker MRN: [id number]  SSN: [id number]    YOB: 1980  Age: 35 y.o. Sex: male      Date of Service: 9/11/2019   Provider: CRUZ Ledezma   Office Location:   [address] [place], [address]  Office Phone: [phone]  Office Fax: [phone]      REASON FOR VISIT:   Chief Complaint   Patient presents with    Diabetes        VITALS:   Visit Vitals  /76 (BP 1 Location: Left arm, BP Patient Position: Sitting)   Pulse 76   Temp 98 °F (36.7 °C) (Oral)   Resp 16   Ht 5' 9\" (1.753 m)   Wt 208 lb (94.3 kg)   SpO2 98%   BMI 30.72 kg/m²        MEDICATIONS:   Current Outpatient Medications on File Prior to Visit   Medication Sig Dispense Refill    glipiZIDE (GLUCOTROL) 5 mg tablet Take 1 Tab by mouth two (2) times a day. 180 Tab 0    simvastatin (ZOCOR) 10 mg tablet Take 1 Tab by mouth nightly. 90 Tab 0    ibuprofen (MOTRIN) 800 mg tablet Take 1 Tab by mouth every eight (8) hours as needed for Pain (and inflammation. ). 30 Tab 0    glucose blood VI test strips (BLOOD GLUCOSE TEST) strip Check blood glucose BID and as needed Dx: E11.9 200 Strip 11    Blood-Glucose Meter monitoring kit Check blood glucose once daily in the morning Dx: E11.9 1 Kit 0    Lancets misc Check blood glucose once daily in the morning Dx: E11.9 1 Each 11     No current facility-administered medications on file prior to visit. ALLERGIES:   No Known Allergies     MEDICAL/SURGICAL HISTORY:  Past Medical History:   Diagnosis Date    Hyperlipidemia     Hypertension     Sleep apnea     Type 2 diabetes mellitus ([facility] [address]       History reviewed. No pertinent surgical history.      FAMILY HISTORY:  Family History   Problem Relation Age of Onset    Hypertension Mother     High Cholesterol Mother     Diabetes Father     Hypertension Father     High Cholesterol Father     Cancer Mother         SOCIAL HISTORY:  Social History     Tobacco Use  Smoking status: Never Smoker    Smokeless tobacco: Never Used   Substance Use Topics    Alcohol use: Yes     Alcohol/week: 4.0 standard drinks     Types: 4 Shots of liquor per week     Comment: rarely    Drug use: No           HISTORY OF PRESENT ILLNESS: Sarah Reed is a 35 y.o. male who presents to the office for a routine follow up visit. Diabetes -  Currently the patient's condition is very poorly controlled. Last A1c: 12.8% on 8/14/19  He was subsequently started on Trulicity 3.95 mg weekly in addition to his glipizide 5 mg BID. He was unable to tolerate metformin even in the extended release formulation due to side effects of diarrhea. He was on Januvia for a brief period of time which did not seem to be helping much. Of note, medication compliance has been somewhat of a challenge for Mr. Rubalcava in the past.     Today, he reports he is doing very well on the Trulicity. AM fasting glucose readings have improved dramatically to 130-170. He has been doing his injections on Wednesdays, and states that usually by Sunday he notices his glucose readings trending a bit higher. He is tolerating the medication without adverse side effects. Last urine microalbumin: 7/23/18, no microalbuminuria noted. Due to repeat today. Last lipid panel: 6/12/19, LDL unable to be calculated at that time due to hypertriglyceridemia. Plan is to repeat lipid panel once hyperglycemia has stabilized. Last foot exam: 6/12/19  Last eye exam: 8/16/18, no signs of retinopathy at that time. REVIEW OF SYSTEMS:  Review of Systems   Constitutional: Negative for chills, fever and malaise/fatigue. Respiratory: Negative for cough, shortness of breath and wheezing. Cardiovascular: Negative for chest pain, palpitations and leg swelling. Gastrointestinal: Negative for abdominal pain, constipation, diarrhea, nausea and vomiting.         PHYSICAL EXAMINATION:  Physical Exam   Constitutional: He is oriented to person, place, and time and well-developed, well-nourished, and in no distress. Cardiovascular: Normal rate, regular rhythm and normal heart sounds. Exam reveals no gallop and no friction rub. No murmur heard. Pulmonary/Chest: Effort normal and breath sounds normal. He has no wheezes. He has no rales. Neurological: He is alert and oriented to person, place, and time. Gait normal.   Skin: Skin is warm and dry. No rash noted. Psychiatric: Mood, memory and affect normal.        RESULTS:  Results for orders placed or performed in visit on 09/11/19   AMB POC URINE, MICROALBUMIN, SEMIQUANT (3 RESULTS)   Result Value Ref Range    ALBUMIN, URINE  Negative mg/L    CREATININE, URINE  mg/dL    Microalbumin/creat ratio (POC)  <30 MG/G        ASSESSMENT/PLAN:  Diagnoses and all orders for this visit:    1. Uncontrolled type 2 diabetes mellitus with microalbuminuria (HCC)  - Seems to be tolerating Trulicity well, with significant improvement in home glucose readings  - Plan to increase dose to 1.5 mg weekly and repeat labs in 2 mo, just prior to next follow up visit  - Continue to work on diet, exercise, and medication compliance   Orders:    -     AMB POC URINE, MICROALBUMIN, SEMIQUANT (3 RESULTS)  -     METABOLIC PANEL, COMPREHENSIVE; Future  -     LIPID PANEL; Future  -     HEMOGLOBIN A1C W/O EAG; Future  -     dulaglutide (TRULICITY) 1.5 TA/0.0 mL sub-q pen; 0.5 mL by SubCUTAneous route every seven (7) days.  -     glipiZIDE (GLUCOTROL) 5 mg tablet; Take 1 Tab by mouth two (2) times a day. -     glucose blood VI test strips (BLOOD GLUCOSE TEST) strip; Check blood glucose BID and as needed Dx: E11.9    2. Mixed hyperlipidemia  - Continue Zocor at current dose for now  - Re-check lipids prior to next visit to reassess LDL now that glycemic control seems to be improving  Orders:    -     simvastatin (ZOCOR) 10 mg tablet; Take 1 Tab by mouth nightly.     3. Encounter for immunization  - Flu shot administered today with patient's consent. VIS provided. Orders:    -     INFLUENZA VIRUS VAC QUAD,SPLIT,PRESV FREE SYRINGE IM  -     OH IMMUNIZ ADMIN,1 SINGLE/COMB VAC/TOXOID      Follow-up and Dispositions    · Return in about 2 months (around 11/11/2019) for routine care and please have dating labs done 3-5 days prior. All questions answered. Patient expresses understanding and agrees with the plan as detailed above.     PATIENT CARE TEAM:   Patient Care Team:  CRUZ Rosa as PCP - General (Physician Assistant)  Ravinder Lancaster MD (Ophthalmology)       CRUZ Ledezma   September 11, 2019   12:14 PM

## 2019-10-09 ENCOUNTER — DOCUMENTATION ONLY (OUTPATIENT)
Dept: FAMILY MEDICINE CLINIC | Age: 39
End: 2019-10-09

## 2019-10-09 NOTE — PROGRESS NOTES
Pt states that he cancelled his appt with the nutritionist because his insurance doesn't cover it and he can't pay out of pocket.

## 2019-10-10 ENCOUNTER — APPOINTMENT (OUTPATIENT)
Dept: NUTRITION | Age: 39
End: 2019-10-10

## 2019-11-06 NOTE — PROGRESS NOTES
1. Have you been to the ER, urgent care clinic since your last visit? Hospitalized since your last visit? No    2. Have you seen or consulted any other health care providers outside of the [facility] since your last visit? Include any pap smears or colon screening.  No       Annual eye exam: 08/16/2018 confirmed with Puja Jarquin at [facility] (no appointment on file)   Pneumococcal vaccine: 03/06/2019  Flu vaccine: 09/11/2019  Patient instructed to remove shoes: yes

## 2019-11-09 ENCOUNTER — HOSPITAL ENCOUNTER (OUTPATIENT)
Dept: LAB | Age: 39
Discharge: HOME | End: 2019-11-09
Payer: COMMERCIAL

## 2019-11-09 LAB
ALBUMIN SERPL-MCNC: 4.2 G/DL (ref 3.4–5)
ALBUMIN/GLOB SERPL: 1.1 {RATIO} (ref 0.8–1.7)
ALP SERPL-CCNC: 91 U/L (ref 45–117)
ALT SERPL-CCNC: 26 U/L (ref 16–61)
ANION GAP SERPL CALC-SCNC: 7 MMOL/L (ref 3–18)
AST SERPL-CCNC: 13 U/L (ref 10–38)
BILIRUB SERPL-MCNC: 0.4 MG/DL (ref 0.2–1)
BUN SERPL-MCNC: 16 MG/DL (ref 7–18)
BUN/CREAT SERPL: 13 (ref 12–20)
CALCIUM SERPL-MCNC: 9.6 MG/DL (ref 8.5–10.1)
CHLORIDE SERPL-SCNC: 105 MMOL/L (ref 100–111)
CHOLEST SERPL-MCNC: 290 MG/DL
CO2 SERPL-SCNC: 30 MMOL/L (ref 21–32)
CREAT SERPL-MCNC: 1.25 MG/DL (ref 0.6–1.3)
GLOBULIN SER CALC-MCNC: 3.7 G/DL (ref 2–4)
GLUCOSE SERPL-MCNC: 109 MG/DL (ref 74–99)
HBA1C MFR BLD: 7.9 % (ref 4.2–5.6)
HDLC SERPL-MCNC: 36 MG/DL (ref 40–60)
HDLC SERPL: 8.1 {RATIO} (ref 0–5)
LDLC SERPL CALC-MCNC: 231.8 MG/DL (ref 0–100)
LIPID PROFILE,FLP: ABNORMAL
POTASSIUM SERPL-SCNC: 4.4 MMOL/L (ref 3.5–5.5)
PROT SERPL-MCNC: 7.9 G/DL (ref 6.4–8.2)
SODIUM SERPL-SCNC: 142 MMOL/L (ref 136–145)
TRIGL SERPL-MCNC: 111 MG/DL (ref ?–150)
VLDLC SERPL CALC-MCNC: 22.2 MG/DL

## 2019-11-09 PROCEDURE — 80053 COMPREHEN METABOLIC PANEL: CPT

## 2019-11-09 PROCEDURE — 36415 COLL VENOUS BLD VENIPUNCTURE: CPT

## 2019-11-09 PROCEDURE — 83036 HEMOGLOBIN GLYCOSYLATED A1C: CPT

## 2019-11-09 PROCEDURE — 80061 LIPID PANEL: CPT

## 2019-11-11 ENCOUNTER — OFFICE VISIT (OUTPATIENT)
Dept: FAMILY MEDICINE CLINIC | Age: 39
End: 2019-11-11

## 2019-11-11 VITALS
HEART RATE: 92 BPM | TEMPERATURE: 98.3 F | BODY MASS INDEX: 30.81 KG/M2 | SYSTOLIC BLOOD PRESSURE: 112 MMHG | WEIGHT: 208 LBS | OXYGEN SATURATION: 95 % | DIASTOLIC BLOOD PRESSURE: 84 MMHG | HEIGHT: 69 IN | RESPIRATION RATE: 14 BRPM

## 2019-11-11 DIAGNOSIS — E66.9 OBESITY (BMI 30-39.9): ICD-10-CM

## 2019-11-11 DIAGNOSIS — G47.33 OBSTRUCTIVE SLEEP APNEA: ICD-10-CM

## 2019-11-11 DIAGNOSIS — I10 ESSENTIAL HYPERTENSION: ICD-10-CM

## 2019-11-11 DIAGNOSIS — E78.2 MIXED HYPERLIPIDEMIA: ICD-10-CM

## 2019-11-11 RX ORDER — ATORVASTATIN CALCIUM 20 MG/1
20 TABLET, FILM COATED ORAL DAILY
Qty: 90 TAB | Refills: 0 | Status: SHIPPED | OUTPATIENT
Start: 2019-11-11 | End: 2020-02-11 | Stop reason: SDUPTHER

## 2019-11-11 NOTE — PATIENT INSTRUCTIONS
[facility] Address: [address], [place], [facility] [address] Phone: [phone] *call to make appointment for your diabetic eye exam

## 2019-11-11 NOTE — PROGRESS NOTES
SUBJECTIVE  Chief Complaint   Patient presents with    Diabetes    Cholesterol Problem     Patient presents for follow up on their diabetes. Labs are up to date. We have reviewed the most recent labs. We also reviewed their cardiac risk factors. The patient is checking home blood sugars. The are consistently 100-115 fasting. The patient has not seen the eye doctor in the past 1 year. Denies any blurred vision. Denies any polyuria, polydipsia, or polyphagia. Denies any weight loss. They are notcurrently taking medications as prescribed. He knows he takes his trulicity and one other \"small pink pill. \"  He tells me he is taking simvastatin but told my nurse otherwise. He says he has a bag at home of 20+ medications that he holds on to. He is unsure why. ROS:  History obtained from the patient  · Respiratory: no cough, shortness of breath, or wheezing  · Cardiovascular: no chest pain, palpitations, or dyspnea on exertion  · Gastrointestinal: no abdominal pain, N/V, change in bowel habits, or black or bloody stools  · Neurological: no numbness or tingling    OBJECTIVE  Blood pressure 112/84, pulse 92, temperature 98.3 °F (36.8 °C), temperature source Oral, resp. rate 14, height 5' 9\" (1.753 m), weight 208 lb (94.3 kg), SpO2 95 %. General:  alert, cooperative, well appearing, in no apparent distress. CV:  The heart sounds are regular in rate and rhythm. There is a normal S1 and S2. There or no murmurs. Lungs: Inspiratory and expiratory efforts are full and unlabored. Lung sounds are clear and equal to auscultation throughout all lung fields without wheezing, rales, or rhonchi. Extremities: There is no clubbing, cyanosis, or edema. The feet are without lesions or ulcerations. Skin: no rashes, no jaundice. Psych: normal affect. Mood good. Oriented x 3. Judgement and insight intact.      Results for orders placed or performed during the hospital encounter of 39/32/02   METABOLIC PANEL, COMPREHENSIVE   Result Value Ref Range    Sodium 142 136 - 145 mmol/L    Potassium 4.4 3.5 - 5.5 mmol/L    Chloride 105 100 - 111 mmol/L    CO2 30 21 - 32 mmol/L    Anion gap 7 3.0 - 18 mmol/L    Glucose 109 (H) 74 - 99 mg/dL    BUN 16 7.0 - 18 MG/DL    Creatinine 1.25 0.6 - 1.3 MG/DL    BUN/Creatinine ratio 13 12 - 20      GFR est AA >60 >60 ml/min/1.73m2    GFR est non-AA >60 >60 ml/min/1.73m2    Calcium 9.6 8.5 - 10.1 MG/DL    Bilirubin, total 0.4 0.2 - 1.0 MG/DL    ALT (SGPT) 26 16 - 61 U/L    AST (SGOT) 13 10 - 38 U/L    Alk. phosphatase 91 45 - 117 U/L    Protein, total 7.9 6.4 - 8.2 g/dL    Albumin 4.2 3.4 - 5.0 g/dL    Globulin 3.7 2.0 - 4.0 g/dL    A-G Ratio 1.1 0.8 - 1.7     LIPID PANEL   Result Value Ref Range    LIPID PROFILE          Cholesterol, total 290 (H) <200 MG/DL    Triglyceride 111 <150 MG/DL    HDL Cholesterol 36 (L) 40 - 60 MG/DL    LDL, calculated 231.8 (H) 0 - 100 MG/DL    VLDL, calculated 22.2 MG/DL    CHOL/HDL Ratio 8.1 (H) 0 - 5.0     HEMOGLOBIN A1C W/O EAG   Result Value Ref Range    Hemoglobin A1c 7.9 (H) 4.2 - 5.6 %     ASSESSMENT / PLAN    ICD-10-CM ICD-9-CM    1. Uncontrolled type 2 diabetes mellitus with microalbuminuria (HCC) E11.29 250.42 dulaglutide (TRULICITY) 1.5 ARIAS/7.9 mL sub-q pen    N36.25 038.36 METABOLIC PANEL, COMPREHENSIVE    R80.9  HEMOGLOBIN A1C W/O EAG   2. Mixed hyperlipidemia E78.2 272.2 LIPID PANEL   3. Essential hypertension T12 699.9 METABOLIC PANEL, COMPREHENSIVE      LIPID PANEL   4. Obstructive sleep apnea G47.33 327.23    5. Obesity (BMI 30-39. 9) E66.9 278.00      Uncontrolled diabetes - since his home blood sugars are improving based on his readings, I will recheck his A1c in 3 months. If it is still over 7%, we will have to get more aggressive with med management. It is also not completely understood what he is taking besides his Trulicity. Hypercholesterolemia - Uncontrolled. Discontinue Zocor (not sure if he is taking anyway).   Start atorvastatin 20mg nightly. Check LFTs and lipids in 3 months. HTN - controlled. Diet and exercise. CESILIA - not using CPAP. Obesity- diet and exercise. We had a discussion about his meds at home - bring meds to office visit for a review and proper med rec. He should not misti medications which it sounds like he is. All chart history elements were reviewed by me at the time of the visit even though marked at time of note closure. Patient understands our medical plan. Patient has provided input and agrees with goals. Alternatives have been explained and offered. All questions answered. The patient is to call if condition worsens or fails to improve. Follow-up and Dispositions    · Return in about 3 months (around 2/11/2020) for routine care with [facility]. Fasting labs due 3-7 days.

## 2019-11-20 ENCOUNTER — OFFICE VISIT (OUTPATIENT)
Dept: FAMILY MEDICINE CLINIC | Age: 39
End: 2019-11-20

## 2019-11-20 VITALS
DIASTOLIC BLOOD PRESSURE: 87 MMHG | OXYGEN SATURATION: 99 % | RESPIRATION RATE: 20 BRPM | SYSTOLIC BLOOD PRESSURE: 122 MMHG | HEART RATE: 103 BPM | HEIGHT: 69 IN | BODY MASS INDEX: 31.16 KG/M2 | TEMPERATURE: 98.5 F | WEIGHT: 210.4 LBS

## 2019-11-20 DIAGNOSIS — K08.89 TOOTH PAIN: Primary | ICD-10-CM

## 2019-11-20 DIAGNOSIS — S00.502A SUPERFICIAL INJURY OF GINGIVA WITH INFECTION, INITIAL ENCOUNTER: ICD-10-CM

## 2019-11-20 DIAGNOSIS — K05.10 SUPERFICIAL INJURY OF GINGIVA WITH INFECTION, INITIAL ENCOUNTER: ICD-10-CM

## 2019-11-20 RX ORDER — KETOROLAC TROMETHAMINE 30 MG/ML
60 INJECTION, SOLUTION INTRAMUSCULAR; INTRAVENOUS ONCE
Qty: 1 VIAL | Refills: 0
Start: 2019-11-20 | End: 2019-11-20 | Stop reason: CLARIF

## 2019-11-20 RX ORDER — KETOROLAC TROMETHAMINE 30 MG/ML
30 INJECTION, SOLUTION INTRAMUSCULAR; INTRAVENOUS ONCE
Qty: 1 VIAL | Refills: 0
Start: 2019-11-20 | End: 2019-11-20

## 2019-11-20 NOTE — PATIENT INSTRUCTIONS
Ketorolac (Toradol) - (By mouth) Why this medicine is used:  
Treats severe pain. Contact a nurse or doctor right away if you have: · Blistering, peeling, red skin rash · Yellow skin or eyes · Bloody vomit or vomit that looks like coffee grounds; bloody or black, tarry stools · Dark urine or pale stools, change in how much or how often you urinate · Nausea, vomiting, loss of appetite, stomach pain Common side effects: 
· Changes in your vision, ringing in your ears · Diarrhea, constipation, indigestion · Headache © 2017 [address]  Information is for End User's use only and may not be sold, redistributed or otherwise used for commercial purposes.

## 2019-11-20 NOTE — PROGRESS NOTES
Marguerite Garcia presents today for   Chief Complaint   Patient presents with    Dental Pain     upper right side, patient states he has tooth pain. He says he was seen by an oral surgeon yesterday, had xrays taken. After examination was told there was nothing found to be wrong with his teeth. He was prescribed Ibuprofen 600, Metaxalone 800mg, Clindamycin 150 mg and Norco 5/325, none of which is helping per his statement. Ivan Rubalcava preferred language for health care discussion is english/other. Is someone accompanying this pt? no    Is the patient using any DME equipment during [address]? no    Depression Screening:  3 most recent PHQ Screens 6/12/2019   Little interest or pleasure in doing things -   Feeling down, depressed, irritable, or hopeless More than half the days   Total Score PHQ 2 -       Learning Assessment:  Learning Assessment 3/6/2019   PRIMARY LEARNER Patient   HIGHEST LEVEL OF EDUCATION - PRIMARY LEARNER  GRADUATED HIGH SCHOOL OR GED   BARRIERS PRIMARY LEARNER NONE   CO-LEARNER CAREGIVER No   PRIMARY LANGUAGE ENGLISH    NEED No   LEARNER PREFERENCE PRIMARY LISTENING   LEARNING SPECIAL TOPICS No   ANSWERED BY patient   RELATIONSHIP SELF       Abuse Screening:  Abuse Screening Questionnaire 3/6/2019   Do you ever feel afraid of your partner? N   Are you in a relationship with someone who physically or mentally threatens you? N   Is it safe for you to go home? Y       Generalized Anxiety  No flowsheet data found. Health Maintenance Due   Topic Date Due    DTaP/Tdap/Td series (1 - Tdap) 08/23/1990   . Health Maintenance reviewed and discussed and ordered per Provider.

## 2019-11-20 NOTE — LETTER
NOTIFICATION RETURN TO WORK / SCHOOL 
 
11/20/2019 3:49 PM 
 
Mr. Jessa Lamar [address] [facility] [place] [id number] To Whom It May Concern: 
 
Jessa Lamar is currently under the care of Jeff Han. He will return to work/school on: 11/22/19 If there are questions or concerns please have the patient contact our office.  
 
 
 
Sincerely, 
 
 
Yony Barnett NP

## 2019-11-21 NOTE — PROGRESS NOTES
HPI  Edward Edwards is a 35 y.o. male. Accompanied by his mother. Chief Complaint   Patient presents with    Dental Pain     upper right side, patient states he has tooth pain. He says he was seen by an oral surgeon yesterday, had xrays taken. After examination was told there was nothing found to be wrong with his teeth. He was prescribed Ibuprofen 600, Metaxalone 800mg, Clindamycin 150 mg and Norco 5/325, none of which is helping per his statement. Patient states he was eating a shrimp and his tooth started hurting after taking a bite. He was seen by oral surgery yesterday and they informed him nothing was wrong with his tooth. He reports they did complete x-rays of his jaw and this was negative. He was given an antibiotic for a gum infection along with pain medication. He reports he started taking the antibiotic and pain medications last night and this has not helped. Reports he has not had any medication that were prescribed by the oral surgeon since 8 am this morning. Reports the medication upsets his stomach. He reports he has difficulty talking and eating since his tooth started hurting. His pain is 10/10 and non-radiating. The pain is sharp and achy. Past Medical History  Past Medical History:   Diagnosis Date    Hyperlipidemia     Hypertension     Obesity     Sleep apnea     Type 2 diabetes mellitus ([facility]        Surgical History  No past surgical history on file. Medications  Current Outpatient Medications   Medication Sig Dispense Refill    dulaglutide (TRULICITY) 1.5 HS/2.1 mL sub-q pen 0.5 mL by SubCUTAneous route every seven (7) days.  4 Pen 2    glucose blood VI test strips (BLOOD GLUCOSE TEST) strip Check blood glucose BID and as needed Dx: E11.9 200 Strip 11    Blood-Glucose Meter monitoring kit Check blood glucose once daily in the morning Dx: E11.9 1 Kit 0    Lancets misc Check blood glucose once daily in the morning Dx: E11.9 1 Each 11    atorvastatin (LIPITOR) 20 mg tablet Take 1 Tab by mouth daily. 90 Tab 0    glipiZIDE (GLUCOTROL) 5 mg tablet Take 1 Tab by mouth two (2) times a day. 180 Tab 0       Allergies  No Known Allergies    Family History  Family History   Problem Relation Age of Onset    Hypertension Mother     High Cholesterol Mother     Diabetes Father     Hypertension Father     High Cholesterol Father     Cancer Mother        Social History  Social History     Socioeconomic History    Marital status: SINGLE     Spouse name: Not on file    Number of children: Not on file    Years of education: Not on file    Highest education level: Not on file   Occupational History    Not on file   Social Needs    Financial resource strain: Not on file    Food insecurity:     Worry: Not on file     Inability: Not on file    Transportation needs:     Medical: Not on file     Non-medical: Not on file   Tobacco Use    Smoking status: Never Smoker    Smokeless tobacco: Never Used   Substance and Sexual Activity    Alcohol use:  Yes     Alcohol/week: 4.0 standard drinks     Types: 4 Shots of liquor per week     Comment: rarely    Drug use: Yes     Frequency: 2.0 times per week     Types: Marijuana    Sexual activity: Yes     Partners: Female     Birth control/protection: None   Lifestyle    Physical activity:     Days per week: Not on file     Minutes per session: Not on file    Stress: Not on file   Relationships    Social connections:     Talks on phone: Not on file     Gets together: Not on file     Attends Spiritism service: Not on file     Active member of club or organization: Not on file     Attends meetings of clubs or organizations: Not on file     Relationship status: Not on file    Intimate partner violence:     Fear of current or ex partner: Not on file     Emotionally abused: Not on file     Physically abused: Not on file     Forced sexual activity: Not on file   Other Topics Concern    Not on file   Social History Narrative    ** Merged History Encounter **            Problem List  Patient Active Problem List   Diagnosis Code    Type 2 diabetes mellitus without complication, without long-term current use of insulin ([facility] E11.9    Mixed hyperlipidemia E78.2    Essential hypertension I10    Obstructive sleep apnea G47.33    Type 2 diabetes with nephropathy ([facility] E11.21       Review of Systems  Review of Systems   Constitutional: Negative for chills and fever. HENT: Negative for ear pain and sore throat. Respiratory: Negative for cough and shortness of breath. Cardiovascular: Negative for chest pain. Vital Signs  Vitals:    11/20/19 1427   BP: 122/87   Pulse: (!) 103   Resp: 20   Temp: 98.5 °F (36.9 °C)   TempSrc: Oral   SpO2: 99%   Weight: 210 lb 6.4 oz (95.4 kg)   Height: 5' 9\" (1.753 m)   PainSc:  10 - Worst pain ever   PainLoc: Teeth       Physical Exam  Physical Exam  HENT:      Head:      Jaw: Tenderness, swelling and pain on movement present. Comments: Right upper jaw area with swelling and tenderness on light palpation. Right upper gum with swelling and tenderness with no visible lesions or redness. Right Ear: Tympanic membrane normal.      Left Ear: Tympanic membrane normal.      Mouth/Throat:      Mouth: Mucous membranes are moist.   Neck:      Musculoskeletal: Normal range of motion. Cardiovascular:      Rate and Rhythm: Regular rhythm. Tachycardia present. Pulmonary:      Effort: Pulmonary effort is normal.      Breath sounds: Normal breath sounds. Diagnostics  Orders Placed This Encounter    KETOROLAC TROMETHAMINE INJ     Order Specific Question:   Dose     Answer:   60mg/2ml     Order Specific Question:   Site     Answer:   RIGHT GLUTEUS     Order Specific Question:   Expiration Date     Answer:   7/31/2021     Order Specific Question:   Lot#     Answer:   [id number]     Order Specific Question:        Answer:   Roman Roche     Order Specific Question:   Charge Quantity?      Answer:   1     Order Specific Question:   Perfomed by/Witnessed by: Answer:   Jhonny Cabot CMS     Order Specific Question:   NDC#     Answer:   55925-270-19 [[id number]]    [place] THER/PROPH/DIAG INJECTION, SUBCUT/IM    DISCONTD: ketorolac tromethamine (TORADOL) 60 mg/2 mL soln     Si mL by IntraMUSCular route once for 1 dose. Dispense:  1 Vial     Refill:  0    DISCONTD: ketorolac (TORADOL) 30 mg/mL (1 mL) injection     Si mL by IntraVENous route once for 1 dose. Dispense:  1 Vial     Refill:  0       Results  Results for orders placed or performed during the hospital encounter of    METABOLIC PANEL, COMPREHENSIVE   Result Value Ref Range    Sodium 142 136 - 145 mmol/L    Potassium 4.4 3.5 - 5.5 mmol/L    Chloride 105 100 - 111 mmol/L    CO2 30 21 - 32 mmol/L    Anion gap 7 3.0 - 18 mmol/L    Glucose 109 (H) 74 - 99 mg/dL    BUN 16 7.0 - 18 MG/DL    Creatinine 1.25 0.6 - 1.3 MG/DL    BUN/Creatinine ratio 13 12 - 20      GFR est AA >60 >60 ml/min/1.73m2    GFR est non-AA >60 >60 ml/min/1.73m2    Calcium 9.6 8.5 - 10.1 MG/DL    Bilirubin, total 0.4 0.2 - 1.0 MG/DL    ALT (SGPT) 26 16 - 61 U/L    AST (SGOT) 13 10 - 38 U/L    Alk. phosphatase 91 45 - 117 U/L    Protein, total 7.9 6.4 - 8.2 g/dL    Albumin 4.2 3.4 - 5.0 g/dL    Globulin 3.7 2.0 - 4.0 g/dL    A-G Ratio 1.1 0.8 - 1.7     LIPID PANEL   Result Value Ref Range    LIPID PROFILE          Cholesterol, total 290 (H) <200 MG/DL    Triglyceride 111 <150 MG/DL    HDL Cholesterol 36 (L) 40 - 60 MG/DL    LDL, calculated 231.8 (H) 0 - 100 MG/DL    VLDL, calculated 22.2 MG/DL    CHOL/HDL Ratio 8.1 (H) 0 - 5.0     HEMOGLOBIN A1C W/O EAG   Result Value Ref Range    Hemoglobin A1c 7.9 (H) 4.2 - 5.6 %         Assessment and Plan  Diagnoses and all orders for this visit:    1. Tooth pain  -     KETOROLAC TROMETHAMINE INJ  -     [place] THER/PROPH/DIAG INJECTION, SUBCUT/IM    2.  Superficial injury of gingiva with infection, initial encounter    Patient to take Gopal Harrington as prescribed by oral surgeon and to avoid ibuprofen for the next 24 hours. Toradal injection ineffective with reports of pain that remained 10/10 20mins post injection. Patient reports he is able to open his mouth and he able to move his jaw around better post injection. Letter for work given. Instructed to take antibiotics as prescribed as patient is a diabetic. Instructed to eat prior to taking antibiotic. Discussed eating soups and soft foods. After care summary printed and reviewed with patient. Plan reviewed with patient. Questions answered. Patient verbalized understanding of plan and is in agreement with plan. Patient to follow up with PCP in one week or earlier if symptoms worsen.      [place], TARAH

## 2020-02-11 ENCOUNTER — OFFICE VISIT (OUTPATIENT)
Dept: FAMILY MEDICINE CLINIC | Age: 40
End: 2020-02-11

## 2020-02-11 VITALS
HEIGHT: 69 IN | SYSTOLIC BLOOD PRESSURE: 126 MMHG | DIASTOLIC BLOOD PRESSURE: 82 MMHG | WEIGHT: 208 LBS | TEMPERATURE: 98.3 F | BODY MASS INDEX: 30.81 KG/M2 | HEART RATE: 102 BPM | OXYGEN SATURATION: 98 % | RESPIRATION RATE: 16 BRPM

## 2020-02-11 DIAGNOSIS — I10 ESSENTIAL HYPERTENSION: ICD-10-CM

## 2020-02-11 DIAGNOSIS — E78.2 MIXED HYPERLIPIDEMIA: ICD-10-CM

## 2020-02-11 RX ORDER — GLIPIZIDE 5 MG/1
5 TABLET ORAL 2 TIMES DAILY
Qty: 180 TAB | Refills: 0 | Status: SHIPPED | OUTPATIENT
Start: 2020-02-11 | End: 2020-04-07 | Stop reason: DRUGHIGH

## 2020-02-11 RX ORDER — ATORVASTATIN CALCIUM 20 MG/1
20 TABLET, FILM COATED ORAL DAILY
Qty: 90 TAB | Refills: 0 | Status: SHIPPED | OUTPATIENT
Start: 2020-02-11 | End: 2020-05-12 | Stop reason: SDUPTHER

## 2020-02-11 NOTE — PROGRESS NOTES
Patient: Tiffanie Adorno MRN: [id number]  SSN: [id number]    YOB: 1980  Age: 35 y.o. Sex: male      Date of Service: 2/11/2020   Provider: Lorice Galeazzi, PA   Office Location:   [address] [place] [address] [address], [address]  Office Phone: [phone]  Office Fax: [phone]      REASON FOR VISIT:   Chief Complaint   Patient presents with    Cholesterol Problem    Diabetes    Hypertension        VITALS:   Visit Vitals  /82 (BP 1 Location: Left arm, BP Patient Position: Sitting)   Pulse (!) 102   Temp 98.3 °F (36.8 °C) (Oral)   Resp 16   Ht 5' 9\" (1.753 m)   SpO2 98%   BMI 31.07 kg/m²        MEDICATIONS:   Current Outpatient Medications on File Prior to Visit   Medication Sig Dispense Refill    atorvastatin (LIPITOR) 20 mg tablet Take 1 Tab by mouth daily. 90 Tab 0    dulaglutide (TRULICITY) 1.5 TH/3.7 mL sub-q pen 0.5 mL by SubCUTAneous route every seven (7) days. 4 Pen 2    glipiZIDE (GLUCOTROL) 5 mg tablet Take 1 Tab by mouth two (2) times a day. 180 Tab 0    glucose blood VI test strips (BLOOD GLUCOSE TEST) strip Check blood glucose BID and as needed Dx: E11.9 (Patient taking differently: Check blood glucose BID and as needed Dx: E11.9 (Brand Livongo)) 200 Strip 11    Blood-Glucose Meter monitoring kit Check blood glucose once daily in the morning Dx: E11.9 (Patient taking differently: Check blood glucose once daily in the morning Dx: E11.9  (Brand Livongo)) 1 Kit 0    Lancets misc Check blood glucose once daily in the morning Dx: E11.9 1 Each 11     No current facility-administered medications on file prior to visit. ALLERGIES:   No Known Allergies     MEDICAL/SURGICAL HISTORY:  Past Medical History:   Diagnosis Date    Hyperlipidemia     Hypertension     Obesity     Sleep apnea     Type 2 diabetes mellitus ([facility] [address]       History reviewed. No pertinent surgical history.      FAMILY HISTORY:  Family History   Problem Relation Age of Onset    Hypertension Mother     High Cholesterol Mother     Diabetes Father     Hypertension Father     High Cholesterol Father     Cancer Mother         SOCIAL HISTORY:  Social History     Tobacco Use    Smoking status: Never Smoker    Smokeless tobacco: Never Used   Substance Use Topics    Alcohol use: Yes     Alcohol/week: 4.0 standard drinks     Types: 4 Shots of liquor per week     Comment: rarely    Drug use: Yes     Frequency: 2.0 times per week     Types: Marijuana             HISTORY OF PRESENT ILLNESS: Patrick Callahan is a 35 y.o. male who presents to the office for a routine follow up visit. Here today for diabetes follow up. He has been doing very well on Trulicity, but ran out of meds a few weeks ago, and states his blood sugar readings have been trending up ever since. He has not been taking his glipizide for unclear reasons. He forgot to have his fasting labs drawn in preparation for this appointment. His statin was recently changed from Zocor to Lipitor. He has been tolerating this change without adverse effect. Again, he forgot to have his fasting labs drawn prior to this appointment. BP is at goal.     Denies any complaints today. REVIEW OF SYSTEMS:  Review of Systems   Respiratory: Negative for cough, shortness of breath and wheezing. Cardiovascular: Negative for chest pain, palpitations and leg swelling. Gastrointestinal: Negative for abdominal pain, nausea and vomiting. PHYSICAL EXAMINATION:  Physical Exam  Cardiovascular:      Rate and Rhythm: Normal rate and regular rhythm. Heart sounds: Normal heart sounds. No murmur. No friction rub. No gallop. Pulmonary:      Effort: Pulmonary effort is normal.      Breath sounds: Normal breath sounds. No wheezing or rales. Skin:     General: Skin is warm and dry. Findings: No rash. Neurological:      Mental Status: He is alert and oriented to person, place, and time.       Gait: Gait is intact. Psychiatric:         Mood and Affect: Mood and affect normal.         Cognition and Memory: Memory normal.          RESULTS:  Lab Results   Component Value Date/Time    Sodium 142 11/09/2019 10:27 AM    Potassium 4.4 11/09/2019 10:27 AM    Chloride 105 11/09/2019 10:27 AM    CO2 30 11/09/2019 10:27 AM    Anion gap 7 11/09/2019 10:27 AM    Glucose 109 (H) 11/09/2019 10:27 AM    BUN 16 11/09/2019 10:27 AM    Creatinine 1.25 11/09/2019 10:27 AM    BUN/Creatinine ratio 13 11/09/2019 10:27 AM    GFR est AA >60 11/09/2019 10:27 AM    GFR est non-AA >60 11/09/2019 10:27 AM    Calcium 9.6 11/09/2019 10:27 AM    Bilirubin, total 0.4 11/09/2019 10:27 AM    AST (SGOT) 13 11/09/2019 10:27 AM    Alk. phosphatase 91 11/09/2019 10:27 AM    Protein, total 7.9 11/09/2019 10:27 AM    Albumin 4.2 11/09/2019 10:27 AM    Globulin 3.7 11/09/2019 10:27 AM    A-G Ratio 1.1 11/09/2019 10:27 AM    ALT (SGPT) 26 11/09/2019 10:27 AM      Lab Results   Component Value Date/Time    Cholesterol, total 290 (H) 11/09/2019 10:27 AM    HDL Cholesterol 36 (L) 11/09/2019 10:27 AM    LDL, calculated 231.8 (H) 11/09/2019 10:27 AM    VLDL, calculated 22.2 11/09/2019 10:27 AM    Triglyceride 111 11/09/2019 10:27 AM    CHOL/HDL Ratio 8.1 (H) 11/09/2019 10:27 AM      Lab Results   Component Value Date/Time    Hemoglobin A1c 7.9 (H) 11/09/2019 10:27 AM    Hemoglobin A1c (POC) 7.6 03/06/2019 01:51 PM        ASSESSMENT/PLAN:  Diagnoses and all orders for this visit:    1. Uncontrolled type 2 diabetes mellitus with microalbuminuria ([address]  - Doing well on Trulicity   - Update labs ASAP  - Resume glipizide  - further instructions to be given based on lab results   Orders:    -     dulaglutide (TRULICITY) 1.5 CI/3.2 mL sub-q pen; 0.5 mL by SubCUTAneous route every seven (7) days.  -     glipiZIDE (GLUCOTROL) 5 mg tablet; Take 1 Tab by mouth two (2) times a day.     2. Mixed hyperlipidemia  - Continue Lipitor  - Have fasting labs drawn ASAP  Orders:    - atorvastatin (LIPITOR) 20 mg tablet; Take 1 Tab by mouth daily. 3. Essential hypertension  - BP stable off meds          Follow-up and Dispositions    · Return in about 3 months (around 5/11/2020) for [address] and fasting labs due ASAP . All questions answered. Patient expresses understanding and agrees with the plan as detailed above.     PATIENT CARE TEAM:   Patient Care Team:  CRUZ Naylor as PCP - General (Physician Assistant)  CRUZ Naylor as PCP - [facility] Empaneled Provider  Lani Monet MD (Ophthalmology)       CRUZ [place]   February 11, 2020   8:54 AM

## 2020-02-11 NOTE — PROGRESS NOTES
1. Have you been to the ER, urgent care clinic since your last visit? Hospitalized since your last visit? No    2. Have you seen or consulted any other health care providers outside of the [address] [place] [address] since your last visit? Include any pap smears or colon screening.  No     Annual eye exam: 08-  Pneumococcal vaccine: 2019  Flu vaccine: 2019  Patient instructed to remove shoes: 06-

## 2020-04-03 ENCOUNTER — TELEPHONE (OUTPATIENT)
Dept: FAMILY MEDICINE CLINIC | Age: 40
End: 2020-04-03

## 2020-04-03 NOTE — LETTER
4/6/2020 8:28 AM 
 
Mr. Alcides Vickers [address] [facility] [address] [facility] [id number] To Whom It May Concern: 
 
 
Alcides Vickers is a patient of our practice and is under my care for the following condition(s): 
 
Type 2 Diabetes In accordance with the CDC's high-risk criteria, this condition may place the patient at a higher risk for serious complications from RVIKS-23. Please consider allowing the appropriate accommodations to limit his exposure to others through the duration of the pandemic. If you have any questions regarding this matter, please have the patient contact our office at [phone].  
 
Sincerely, 
 
 
 
 
Milan Vazquez PA-C

## 2020-04-03 NOTE — TELEPHONE ENCOUNTER
Patient called in to request a cheaper brand of trulicity. He states he no longer has insurance & cannot afford $800 for the medication. To discuss this further the patient can be reached at [phone].

## 2020-04-03 NOTE — TELEPHONE ENCOUNTER
Pt states that he needs a letter for his job stating that he has diabetes and needs to stay home from work because of the Covid 19. Advised that Jaynejohn Davis is out of the office till Monday and it would be addressed then. Please advise.

## 2020-04-06 NOTE — TELEPHONE ENCOUNTER
Please schedule virtual visit to discuss further. Our options for his meds are dependent on how well his DM is controlled. Ideally, I'd like him to get his labs done beforehand. If he feels comfortable going to the lab, I'd encourage him to wear a mask and keep a safe distance from other patients.

## 2020-04-07 ENCOUNTER — VIRTUAL VISIT (OUTPATIENT)
Dept: FAMILY MEDICINE CLINIC | Age: 40
End: 2020-04-07

## 2020-04-07 RX ORDER — GLIPIZIDE 10 MG/1
10 TABLET ORAL 2 TIMES DAILY
Qty: 60 TAB | Refills: 0 | Status: SHIPPED | OUTPATIENT
Start: 2020-04-07 | End: 2020-05-12 | Stop reason: DRUGHIGH

## 2020-04-07 NOTE — PROGRESS NOTES
Consent: Benja Rendon, who was seen by synchronous (real-time) audio-video technology, and/or his healthcare decision maker, is aware that this patient-initiated, Telehealth encounter on 4/7/2020 is a billable service, with coverage as determined by his insurance carrier. He is aware that he may receive a bill and has provided verbal consent to proceed: Yes. Assessment & Plan:   Diagnoses and all orders for this visit:    1. Uncontrolled type 2 diabetes mellitus with microalbuminuria ([facility]  - Previously well controlled on Trulicity, now unable to afford meds due to loss of health insurance  - [address] [place] [address] application, and will mail to patient to complete his portion. Explained that I am not certain if he qualifies, as it depends on his household income. - While we await a response, will increase his glipizide to 10 mg BID. Unfortunately he's been unable to tolerate metformin due to GI side effects, and other PO medications will also likely prove to be unaffordable without health insurance. Patient is very resistant to the idea of insulin.  - He will have his fasting labs done ASAP  Orders:    -     glipiZIDE (GLUCOTROL) 10 mg tablet; Take 1 Tab by mouth two (2) times a day. Follow-up and Dispositions    · Return for follow up as scheduled (convert to [employer] visit). 712  Subjective:   Benja Rendon is a 35 y.o. male who was seen for No chief complaint on file. Prior to Admission medications    Medication Sig Start Date End Date Taking? Authorizing Provider   dulaglutide (TRULICITY) 1.5 QW/2.8 mL sub-q pen 0.5 mL by SubCUTAneous route every seven (7) days. 2/11/20   Jayy Flores PA   atorvastatin (LIPITOR) 20 mg tablet Take 1 Tab by mouth daily. 2/11/20   Heather Flores PA   glipiZIDE (GLUCOTROL) 5 mg tablet Take 1 Tab by mouth two (2) times a day.  2/11/20   Heather Flores PA   glucose blood VI test strips (BLOOD GLUCOSE TEST) strip Check blood glucose BID and as needed Dx: E11.9  Patient taking differently: Check blood glucose BID and as needed Dx: E11.9 (Brand Livongo) 9/11/19   Heather Flores PA   Blood-Glucose Meter monitoring kit Check blood glucose once daily in the morning Dx: E11.9  Patient taking differently: Check blood glucose once daily in the morning Dx: E11.9  (Brand Livongo) 6/14/18   CRUZ Romero   Lancets misc Check blood glucose once daily in the morning Dx: E11.9 6/14/18   Balta Flores PA     No Known Allergies    Patient is being seen today via telemedicine to discuss his diabetes medications. He reports that his DM had been well controlled on Trulicity 1.5 mg weekly and glipizide 5 mg BID. Unfortunately, his wife recently lost her job, and subsequently their health insurance. He is not eligible for health insurance through his employer until open enrollment in the fall. He has now been without his Trulicity for about a month. He states that his home glucose readings have been in the 200s, whereas they had previously been averaging 120-130. He reports feeling well and is asymptomatic of his hyperglycemia. Review of Systems   Constitutional: Negative for chills, fever and malaise/fatigue. Respiratory: Negative for cough, shortness of breath and wheezing. Cardiovascular: Negative for chest pain. Gastrointestinal: Negative for abdominal pain, diarrhea, nausea and vomiting. Neurological: Negative for dizziness, tingling and headaches. Objective: There were no vitals taken for this visit.    General: alert, cooperative, no distress   Mental  status: normal mood, behavior, speech, dress, motor activity, and thought processes, able to follow commands   HENT: NCAT   Neck: no visualized mass   Resp: no respiratory distress   Neuro: no gross deficits   Skin: no discoloration or lesions of concern on visible areas   Psychiatric: normal affect, consistent with stated mood, no evidence of hallucinations Additional exam findings: We discussed the expected course, resolution and complications of the diagnosis(es) in detail. Medication risks, benefits, costs, interactions, and alternatives were discussed as indicated. I advised him to contact the office if his condition worsens, changes or fails to improve as anticipated. He expressed understanding with the diagnosis(es) and plan. Olena Hashimoto is a 35 y.o. male being evaluated by a video visit encounter for concerns as above. A caregiver was present when appropriate. Due to this being a TeleHealth encounter (During LJQCD-26 public health emergency), evaluation of the following organ systems was limited: Vitals/Constitutional/EENT/Resp/CV/GI//MS/Neuro/Skin/Heme-Lymph-Imm. Pursuant to the emergency declaration under the [facility]5 waiver authority and the [employer] Act, this Virtual  Visit was conducted, with patient's (and/or legal guardian's) consent, to reduce the patient's risk of exposure to COVID-19 and provide necessary medical care. Services were provided through a video synchronous discussion virtually to substitute for in-person clinic visit. Patient and provider were located at their individual homes.         CRUZ Garcia   4/7/2020  9:49 AM

## 2020-04-08 ENCOUNTER — HOSPITAL ENCOUNTER (OUTPATIENT)
Dept: LAB | Age: 40
Discharge: HOME | End: 2020-04-08
Payer: SELF-PAY

## 2020-04-08 DIAGNOSIS — E78.2 MIXED HYPERLIPIDEMIA: ICD-10-CM

## 2020-04-08 DIAGNOSIS — I10 ESSENTIAL HYPERTENSION: ICD-10-CM

## 2020-04-08 LAB
ALBUMIN SERPL-MCNC: 4.2 G/DL (ref 3.4–5)
ALBUMIN/GLOB SERPL: 1.1 {RATIO} (ref 0.8–1.7)
ALP SERPL-CCNC: 72 U/L (ref 45–117)
ALT SERPL-CCNC: 32 U/L (ref 16–61)
ANION GAP SERPL CALC-SCNC: 5 MMOL/L (ref 3–18)
AST SERPL-CCNC: 13 U/L (ref 10–38)
BILIRUB SERPL-MCNC: 0.4 MG/DL (ref 0.2–1)
BUN SERPL-MCNC: 16 MG/DL (ref 7–18)
BUN/CREAT SERPL: 14 (ref 12–20)
CALCIUM SERPL-MCNC: 9.2 MG/DL (ref 8.5–10.1)
CHLORIDE SERPL-SCNC: 107 MMOL/L (ref 100–111)
CHOLEST SERPL-MCNC: 269 MG/DL
CO2 SERPL-SCNC: 27 MMOL/L (ref 21–32)
CREAT SERPL-MCNC: 1.13 MG/DL (ref 0.6–1.3)
GLOBULIN SER CALC-MCNC: 3.8 G/DL (ref 2–4)
GLUCOSE SERPL-MCNC: 159 MG/DL (ref 74–99)
HBA1C MFR BLD: 6.9 % (ref 4.2–5.6)
HDLC SERPL-MCNC: 32 MG/DL (ref 40–60)
HDLC SERPL: 8.4 {RATIO} (ref 0–5)
LDLC SERPL CALC-MCNC: 199.6 MG/DL (ref 0–100)
LIPID PROFILE,FLP: ABNORMAL
POTASSIUM SERPL-SCNC: 4.4 MMOL/L (ref 3.5–5.5)
PROT SERPL-MCNC: 8 G/DL (ref 6.4–8.2)
SODIUM SERPL-SCNC: 139 MMOL/L (ref 136–145)
TRIGL SERPL-MCNC: 187 MG/DL (ref ?–150)
VLDLC SERPL CALC-MCNC: 37.4 MG/DL

## 2020-04-08 PROCEDURE — 80053 COMPREHEN METABOLIC PANEL: CPT

## 2020-04-08 PROCEDURE — 80061 LIPID PANEL: CPT

## 2020-04-08 PROCEDURE — 83036 HEMOGLOBIN GLYCOSYLATED A1C: CPT

## 2020-04-08 PROCEDURE — 36415 COLL VENOUS BLD VENIPUNCTURE: CPT

## 2020-04-20 ENCOUNTER — TELEPHONE (OUTPATIENT)
Dept: FAMILY MEDICINE CLINIC | Age: 40
End: 2020-04-20

## 2020-04-20 NOTE — TELEPHONE ENCOUNTER
Sugars have been 350. Legs are tingling, feeling a little dizzy sometimes. Filled out paperwork for Trulicity, but hasn't heard back. Has been out of the Trulicity for two months now. Called to see if we have any samples and I explained we do not have samples here at the office, but that I would send a message back to [address] [place] [address] and nurse to see what she recommends.

## 2020-04-20 NOTE — TELEPHONE ENCOUNTER
Nurses to please check status of Trulicity [employer]). Per the document scanned on 4/15, it looks like he was approved. If he is not able to resume the medication soon, we may need to start him on insulin.

## 2020-04-20 NOTE — TELEPHONE ENCOUNTER
Spoke with Andry Melvin to inform him that [employer] does state he qualifies for assistance, rx has been received via fax and pending shipment. Ivan voice concerns about elevated blood sugars and when can he return to work?

## 2020-04-20 NOTE — TELEPHONE ENCOUNTER
A hard copy RX needs to be faxed to [address] to [phone]. Once received order will be shipped.

## 2020-04-20 NOTE — TELEPHONE ENCOUNTER
I'm hoping that he can get his Trulicity within the next week. In the meantime, he should manage his blood sugars by taking his glipizide, strictly following a low carb diet, and drinking plenty of water.    If his glucose readings still remain in the 300s, we can consider having him resume metformin temporarily while he waits for his Trulicity shipment

## 2020-04-20 NOTE — TELEPHONE ENCOUNTER
Spoke with Ivan to inform him that PCP CRUZ Saenz was hoping that he can get his Trulicity within the next week. In the meantime, he should manage his blood sugars by taking his glipizide, strictly following a low carb diet, and drinking plenty of water. Margarito Rivers is aware if his glucose readings still remain in the 300s, we can consider having him resume metformin temporarily while he waits for his Trulicity shipment. Ivan voice understanding and will call on Thursday April 23,2020 with an update on glucose levels.

## 2020-05-12 ENCOUNTER — VIRTUAL VISIT (OUTPATIENT)
Dept: FAMILY MEDICINE CLINIC | Age: 40
End: 2020-05-12

## 2020-05-12 DIAGNOSIS — E78.2 MIXED HYPERLIPIDEMIA: ICD-10-CM

## 2020-05-12 RX ORDER — ATORVASTATIN CALCIUM 20 MG/1
20 TABLET, FILM COATED ORAL DAILY
Qty: 90 TAB | Refills: 0 | Status: SHIPPED | OUTPATIENT
Start: 2020-05-12 | End: 2020-09-22 | Stop reason: DRUGHIGH

## 2020-05-12 RX ORDER — GLYBURIDE 2.5 MG/1
2.5 TABLET ORAL
Qty: 90 TAB | Refills: 0 | Status: SHIPPED | OUTPATIENT
Start: 2020-05-12 | End: 2020-09-22 | Stop reason: SDUPTHER

## 2020-05-12 NOTE — PROGRESS NOTES
Natalie Tello is a 35 y.o. male who was seen by synchronous (real-time) audio-video technology on 5/12/2020. Consent: Natalie Tello, who was seen by synchronous (real-time) audio-video technology, and/or his healthcare decision maker, is aware that this patient-initiated, Telehealth encounter on 5/12/2020 is a billable service, with coverage as determined by his insurance carrier. He is aware that he may receive a bill and has provided verbal consent to proceed: NA - Consent obtained within past 12 months. Assessment & Plan:   Diagnoses and all orders for this visit:    1. Uncontrolled type 2 diabetes mellitus with microalbuminuria (HCC)  - Seems to be improving now that patient has resumed Trulicity, though explained ongoing need for oral medication as his blood sugars are not at goal.   - Switch to glyburide as below  - Continue to work on diet & exercise   - Repeat fasting labs prior to next visit  Orders:    -     glyBURIDE (DIABETA) 2.5 mg tablet; Take 1 Tab by mouth Daily (before breakfast). -     METABOLIC PANEL, COMPREHENSIVE; Future  -     LIPID PANEL; Future  -     HEMOGLOBIN A1C W/O EAG; Future    2. Mixed hyperlipidemia  - Poorly controlled, discovered today that patient has not been taking his medication as prescribed  - Counseled on importance of compliance with Lipitor  - repeat fasting labs prior to next visit  Orders:    -     atorvastatin (LIPITOR) 20 mg tablet; Take 1 Tab by mouth daily. Follow-up and Dispositions    · Return in about 3 months (around 8/12/2020) for routine care (Dr. Cayla Hansen) fasting labs due prior. 712  Subjective:   Natalie Tello is a 35 y.o. male who was seen for No chief complaint on file. Prior to Admission medications    Medication Sig Start Date End Date Taking? Authorizing Provider   dulaglutide (TRULICITY) 1.5 ARIAS/9.1 mL sub-q pen 0.5 mL by SubCUTAneous route every seven (7) days.  4/20/20   CRUZ Acevedo   glipiZIDE (GLUCOTROL) 10 mg tablet Take 1 Tab by mouth two (2) times a day. 4/7/20   Beronica Flores PA   atorvastatin (LIPITOR) 20 mg tablet Take 1 Tab by mouth daily. 2/11/20   Beronica Flores PA   glucose blood VI test strips (BLOOD GLUCOSE TEST) strip Check blood glucose BID and as needed Dx: E11.9  Patient taking differently: Check blood glucose BID and as needed Dx: E11.9 (Brand Livongo) 9/11/19   Heather Flores PA   Blood-Glucose Meter monitoring kit Check blood glucose once daily in the morning Dx: E11.9  Patient taking differently: Check blood glucose once daily in the morning Dx: E11.9  (Brand Livongo) 6/14/18   CRUZ Anne   Lancets misc Check blood glucose once daily in the morning Dx: E11.9 6/14/18   CRUZ Kelly     No Known Allergies    Diabetes -  Type 2, improving   Last A1c: 6.9% on 4/11/20  Patient is taking Trulicity 1.5 mg weekly. He has not been taking his glipizide because he states \"it makes me go to the bathroom,\" however my suspicion is that he has confused this with the metformin he had previously been prescribed. It seems patient has not been taking any of his oral meds for quite some time   Home glucose readings:  AM fasting ranges from 130-180. Patient denies symptomatic hypo- or hyperglycemia since last visit. Denies headaches, lightheadedness, dizziness, chest pain, SOB, heart palpitations, nausea, vomiting, change in BMs, urinary frequency/urgency, skin changes/wounds, sensory disturbances     Hyperlipidemia -   Recent LDL noted to be elevated at 199. Patient admits he has not been taking his cholesterol medication. He does not have a reason why. ROS as noted above       Objective: There were no vitals taken for this visit.    General: alert, cooperative, no distress   Mental  status: normal mood, behavior, speech, dress, motor activity, and thought processes, able to follow commands   HENT: NCAT   Neck: no visualized mass   Resp: no respiratory distress   Neuro: no gross deficits   Skin: no discoloration or lesions of concern on visible areas   Psychiatric: normal affect, consistent with stated mood, no evidence of hallucinations     Lab Results   Component Value Date/Time    Sodium 139 04/08/2020 07:59 AM    Potassium 4.4 04/08/2020 07:59 AM    Chloride 107 04/08/2020 07:59 AM    CO2 27 04/08/2020 07:59 AM    Anion gap 5 04/08/2020 07:59 AM    Glucose 159 (H) 04/08/2020 07:59 AM    BUN 16 04/08/2020 07:59 AM    Creatinine 1.13 04/08/2020 07:59 AM    BUN/Creatinine ratio 14 04/08/2020 07:59 AM    GFR est AA >60 04/08/2020 07:59 AM    GFR est non-AA >60 04/08/2020 07:59 AM    Calcium 9.2 04/08/2020 07:59 AM    Bilirubin, total 0.4 04/08/2020 07:59 AM    AST (SGOT) 13 04/08/2020 07:59 AM    Alk. phosphatase 72 04/08/2020 07:59 AM    Protein, total 8.0 04/08/2020 07:59 AM    Albumin 4.2 04/08/2020 07:59 AM    Globulin 3.8 04/08/2020 07:59 AM    A-G Ratio 1.1 04/08/2020 07:59 AM    ALT (SGPT) 32 04/08/2020 07:59 AM      Lab Results   Component Value Date/Time    Cholesterol, total 269 (H) 04/08/2020 07:59 AM    HDL Cholesterol 32 (L) 04/08/2020 07:59 AM    LDL, calculated 199.6 (H) 04/08/2020 07:59 AM    VLDL, calculated 37.4 04/08/2020 07:59 AM    Triglyceride 187 (H) 04/08/2020 07:59 AM    CHOL/HDL Ratio 8.4 (H) 04/08/2020 07:59 AM      Lab Results   Component Value Date/Time    Hemoglobin A1c 6.9 (H) 04/08/2020 07:59 AM    Hemoglobin A1c (POC) 7.6 03/06/2019 01:51 PM          We discussed the expected course, resolution and complications of the diagnosis(es) in detail. Medication risks, benefits, costs, interactions, and alternatives were discussed as indicated. I advised him to contact the office if his condition worsens, changes or fails to improve as anticipated. He expressed understanding with the diagnosis(es) and plan. Marco Leal is a 35 y.o. male who was evaluated by a video visit encounter for concerns as above.  Patient identification was verified prior to start of the visit. A caregiver was present when appropriate. Due to this being a TeleHealth encounter (During [facility]-79 public health emergency), evaluation of the following organ systems was limited: Vitals/Constitutional/EENT/Resp/CV/GI//MS/Neuro/Skin/Heme-Lymph-Imm. Pursuant to the emergency declaration under the [address] [place] [address], [facility] access hospital[facility] waiver authority and the [employer] Act, this Virtual  Visit was conducted, with patient's (and/or legal guardian's) consent, to reduce the patient's risk of exposure to COVID-19 and provide necessary medical care. Services were provided through a video synchronous discussion virtually to substitute for in-person clinic visit. Patient and provider were located at their individual homes.       CRUZ Cooley  5/12/2020  12:20 PM

## 2020-06-01 NOTE — PROGRESS NOTES
Please scheduled for the first week of August for follow-up. Labs prior. He is aware of labs since Taylor went over these at his May virtual visit.

## 2020-06-02 NOTE — PROGRESS NOTES
[phone] 2 patient identifiers verified with Marquis Zhu, labs reviewed once again, appointment scheduled for 08- (in person) and he is aware to have labs done 3-5 days prior

## 2020-08-21 NOTE — PROGRESS NOTES
1. Have you been to the ER, urgent care clinic since your last visit? Hospitalized since your last visit? No    2. Have you seen or consulted any other health care providers outside of the [address] [place] [address] since your last visit? Include any pap smears or colon screening.  No Patient desires to schedule bilateral tubal salpingectomy for permanent sterilization with any doctor.  Prefers a Friday

## 2020-09-01 NOTE — TELEPHONE ENCOUNTER
This patient contacted office for the following prescriptions to be filled:    Medication requested :   Requested Prescriptions     Pending Prescriptions Disp Refills    dulaglutide (TRULICITY) 1.5 GT/7.1 mL sub-q pen 4 Syringe 5     Si.5 mL by SubCUTAneous route every seven (7) days.      PCP: [address] [place] [address] Drive or Print: Mayco gooden or Local pharmacy [facility]    Scheduled appointment if not seen by current providers in office:  LOV 2020 f/u [address]

## 2020-09-02 NOTE — TELEPHONE ENCOUNTER
Pt called inquiring about his insulin refill and states that he is going to fall out because he is out of this medication. Advised yesterday that there is a 72 hour turn around on refills and that I would let the dr know that he was out of the medication. He didn't say that he was out yesterday when requested. Advised that he needs to request the refill before he runs out the next time .  Pt voiced understanding

## 2020-09-04 ENCOUNTER — TELEPHONE (OUTPATIENT)
Dept: FAMILY MEDICINE CLINIC | Age: 40
End: 2020-09-04

## 2020-09-04 NOTE — TELEPHONE ENCOUNTER
Fax received from [facility] requesting a prior authorization on patient's Trulicity. Prior authorization has been approved. Case ID # [id number]. Valid 09/04/2020-12/31/2099. Pharmacy notified of prior auth approval via fax ([phone]).

## 2020-09-21 ENCOUNTER — HOSPITAL ENCOUNTER (OUTPATIENT)
Dept: LAB | Age: 40
Discharge: HOME | End: 2020-09-21

## 2020-09-21 LAB — XX-LABCORP SPECIMEN COL,LCBCF: NORMAL

## 2020-09-21 PROCEDURE — 99001 SPECIMEN HANDLING PT-LAB: CPT

## 2020-09-22 ENCOUNTER — TELEPHONE (OUTPATIENT)
Dept: FAMILY MEDICINE CLINIC | Age: 40
End: 2020-09-22

## 2020-09-22 ENCOUNTER — VIRTUAL VISIT (OUTPATIENT)
Dept: FAMILY MEDICINE CLINIC | Age: 40
End: 2020-09-22
Payer: COMMERCIAL

## 2020-09-22 DIAGNOSIS — Z91.199 MEDICALLY NONCOMPLIANT: ICD-10-CM

## 2020-09-22 DIAGNOSIS — K62.5 BRBPR (BRIGHT RED BLOOD PER RECTUM): ICD-10-CM

## 2020-09-22 DIAGNOSIS — E78.2 MIXED HYPERLIPIDEMIA: ICD-10-CM

## 2020-09-22 LAB
ALBUMIN SERPL-MCNC: 5 G/DL (ref 4–5)
ALBUMIN/GLOB SERPL: 1.9 {RATIO} (ref 1.2–2.2)
ALP SERPL-CCNC: 76 IU/L (ref 39–117)
ALT SERPL-CCNC: 25 IU/L (ref 0–44)
AST SERPL-CCNC: 14 IU/L (ref 0–40)
BILIRUB SERPL-MCNC: 0.2 MG/DL (ref 0–1.2)
BUN SERPL-MCNC: 17 MG/DL (ref 6–24)
BUN/CREAT SERPL: 15 (ref 9–20)
CALCIUM SERPL-MCNC: 9.8 MG/DL (ref 8.7–10.2)
CHLORIDE SERPL-SCNC: 103 MMOL/L (ref 96–106)
CHOLEST SERPL-MCNC: 302 MG/DL (ref 100–199)
CO2 SERPL-SCNC: 21 MMOL/L (ref 20–29)
CREAT SERPL-MCNC: 1.13 MG/DL (ref 0.76–1.27)
GLOBULIN SER CALC-MCNC: 2.7 G/DL (ref 1.5–4.5)
GLUCOSE SERPL-MCNC: 132 MG/DL (ref 65–99)
HBA1C MFR BLD: 7.8 % (ref 4.8–5.6)
HDLC SERPL-MCNC: 36 MG/DL
INTERPRETATION, 910389: NORMAL
LDLC SERPL CALC-MCNC: 210 MG/DL (ref 0–99)
Lab: NORMAL
POTASSIUM SERPL-SCNC: 5 MMOL/L (ref 3.5–5.2)
PROT SERPL-MCNC: 7.7 G/DL (ref 6–8.5)
SODIUM SERPL-SCNC: 142 MMOL/L (ref 134–144)
TRIGL SERPL-MCNC: 275 MG/DL (ref 0–149)
VLDLC SERPL CALC-MCNC: 56 MG/DL (ref 5–40)

## 2020-09-22 PROCEDURE — 3051F HG A1C>EQUAL 7.0%<8.0%: CPT | Performed by: FAMILY MEDICINE

## 2020-09-22 PROCEDURE — 99214 OFFICE O/P EST MOD 30 MIN: CPT | Performed by: FAMILY MEDICINE

## 2020-09-22 RX ORDER — GLYBURIDE 5 MG/1
5 TABLET ORAL
Qty: 90 TAB | Refills: 0 | Status: SHIPPED | OUTPATIENT
Start: 2020-09-22 | End: 2020-12-10 | Stop reason: SDUPTHER

## 2020-09-22 RX ORDER — ATORVASTATIN CALCIUM 40 MG/1
40 TABLET, FILM COATED ORAL
Qty: 90 TAB | Refills: 0 | Status: SHIPPED | OUTPATIENT
Start: 2020-09-22 | End: 2020-12-10 | Stop reason: SDUPTHER

## 2020-09-22 NOTE — PROGRESS NOTES
1. Have you been to the ER, urgent care clinic since your last visit? Hospitalized since your last visit? No    2. Have you seen or consulted any other health care providers outside of the [address] [place] [address] since your last visit? Include any pap smears or colon screening.  No

## 2020-09-22 NOTE — TELEPHONE ENCOUNTER
[employer] is requesting a prior auth for RX Trulicity 1.5 MG /4.6 ML pen . Please call [phone] .  Thank you

## 2020-09-22 NOTE — PROGRESS NOTES
Ivan Rubalcava, who was evaluated through a synchronous (real-time) audio-video encounter, and/or his healthcare decision maker, is aware that it is a billable service, with coverage as determined by his insurance carrier. He provided verbal consent to proceed: Yes, and patient identification was verified. It was conducted pursuant to the emergency declaration under the [facility], [address] [place] [address] and the [employer] and [employer] Act. A caregiver was present when appropriate. Ability to conduct physical exam was limited. I was in the office. The patient was at home. SUBJECTIVE    Chief Complaint   Patient presents with    Diabetes    Cholesterol Problem    Other     denies abd pain,      Patient presents for establishing care. Diabetes -  Type 2, worsening. Off glyburide and not sure why. Patient is taking Trulicity 1.5 mg weekly. Home glucose readings:  AM fasting ranges from 180s. Patient denies symptomatic hypo- or hyperglycemia since last visit. Denies headaches, lightheadedness, dizziness, chest pain, SOB, heart palpitations, nausea, vomiting, change in BMs, urinary frequency/urgency, skin changes/wounds, sensory disturbances      Says he does not exercise and eats unhealthy but wants his diabetes to go away     Hyperlipidemia -   Recent LDL noted to be elevated. Patient still admits he has not been taking his cholesterol medication. He does not have a reason why. ROS:  History obtained from the patient   · General: negative for - chills, fever, weight changes or malaise  · Respiratory: no cough, shortness of breath, or wheezing  · Cardiovascular: no chest pain, palpitations, or dyspnea on exertion  · Gastrointestinal: no abdominal pain, N/V, change in bowel habits, or black stools. Reports a recent episode of BRBPR.    · Musculoskeletal: no back pain, joint pain, joint stiffness, muscle pain or muscle weakness  · Neurological: no numbness, tingling, headache or dizziness  · Endo:  No polyuria or polydipsia. OBJECTIVE    General:  Alert, cooperative, well appearing, in no apparent distress. Psych: normal affect. Mood good. Oriented x 3. Judgement and insight intact. Results for Miesha Varghese (MRN [id number]) as of 9/22/2020 14:10   Ref. Range 9/21/2020 09:44   Sodium Latest Ref Range: 134 - 144 mmol/L 142   Potassium Latest Ref Range: 3.5 - 5.2 mmol/L 5.0   Chloride Latest Ref Range: 96 - 106 mmol/L 103   CO2 Latest Ref Range: 20 - 29 mmol/L 21   Glucose Latest Ref Range: 65 - 99 mg/dL 132 (H)   BUN Latest Ref Range: 6 - 24 mg/dL 17   Creatinine Latest Ref Range: 0.76 - 1.27 mg/dL 1.13   BUN/Creatinine ratio Latest Ref Range: 9 - 20  15   Calcium Latest Ref Range: 8.7 - 10.2 mg/dL 9.8   GFR est non-AA Latest Ref Range: >59 mL/min/1.73 80   GFR est AA Latest Ref Range: >59 mL/min/1.73 93   Bilirubin, total Latest Ref Range: 0.0 - 1.2 mg/dL 0.2   Protein, total Latest Ref Range: 6.0 - 8.5 g/dL 7.7   Albumin Latest Ref Range: 4.0 - 5.0 g/dL 5.0   A-G Ratio Latest Ref Range: 1.2 - 2.2  1.9   ALT Latest Ref Range: 0 - 44 IU/L 25   AST Latest Ref Range: 0 - 40 IU/L 14   Alk. phosphatase Latest Ref Range: 39 - 117 IU/L 76   Triglyceride Latest Ref Range: 0 - 149 mg/dL 275 (H)   Cholesterol, total Latest Ref Range: 100 - 199 mg/dL 302 (H)   HDL Cholesterol Latest Ref Range: >39 mg/dL 36 (L)   LDL Cholesterol Latest Ref Range: 0 - 99 mg/dL 210 (H)   Hemoglobin A1c, (calculated) Latest Ref Range: 4.8 - 5.6 % 7.8 (H)   [facility] SPECIMEN COL Unknown Rpt   VLDL Cholesterol Jose Ramon Latest Ref Range: 5 - 40 mg/dL 56 (H)       ASSESSMENT / PLAN    ICD-10-CM ICD-9-CM    1.  Uncontrolled type 2 diabetes mellitus with microalbuminuria (HCC)  E11.29 250.42 glyBURIDE (DIABETA) 5 mg tablet    E11.65 583.81 dulaglutide (TRULICITY) 1.5 FV/8.0 mL sub-q pen    R80.9  CBC WITH AUTOMATED DIFF      HEMOGLOBIN A1C WITH EAG      LIPID PANEL      METABOLIC PANEL, COMPREHENSIVE      MICROALBUMIN, UR, RAND W/ MICROALB/CREAT RATIO   2. Mixed hyperlipidemia  E78.2 272.2 atorvastatin (LIPITOR) 40 mg tablet      LIPID PANEL      METABOLIC PANEL, COMPREHENSIVE   3. BRBPR (bright red blood per rectum)  K62.5 569.3 REFERRAL TO GASTROENTEROLOGY      CBC WITH AUTOMATED DIFF   4. Medically noncompliant  Z91.19 V15.81      Uncontrolled type 2 diabetes mellitus with microalbuminuria ([facility]  - He is noncompliant with his oral medication his blood sugars are not at goal.   - re-prescribe glyburide  - Work on diet & exercise   - Repeat fasting labs prior to next visit  -need to obtain eye exam but he cannot tell me the eye doctor's name     Mixed hyperlipidemia  - Poorly controlled  - patient has not been taking his medication as prescribed  - Counseled on importance of compliance with Lipitor  - repeat fasting labs prior to next visit    BRBPR   -refer to GI for work-up  -recheck CBC with next labs. -no signs of blood loss by symptom review    Medical noncompliance  -advised on better compliance. All chart history elements were reviewed by me at the time of the visit even though marked at time of note closure. Patient understands our medical plan. Patient has provided input and agrees with goals. Alternatives have been explained and offered. All questions answered. The patient is to call if condition worsens or fails to improve. RTC in 11 weeks, fasting labs prior.

## 2020-09-22 NOTE — PATIENT INSTRUCTIONS

## 2020-09-23 NOTE — TELEPHONE ENCOUNTER
Prior authorization approved. Case [id number]. The coverage is valid 9/23/2020-9/23/2021. [employer]

## 2020-09-23 NOTE — TELEPHONE ENCOUNTER
Prior authorization submitted via covermymeds. Per automated message: [employer] is reviewing your PA request and will respond within 24 hours for Medicaid or up to 72 hours for non-Medicaid plans, based on the required timeframe determined by state or federal regulations.

## 2020-10-09 ENCOUNTER — TELEPHONE (OUTPATIENT)
Dept: FAMILY MEDICINE CLINIC | Age: 40
End: 2020-10-09

## 2020-10-09 NOTE — TELEPHONE ENCOUNTER
Spoke with Ivan advised that Dr. Ollie Dan states that he may try Nyquil at night and Mucinex DM in the daytime until his appt.   Ivan voice understanding and made aware that he is also overdue for his DM eye exam

## 2020-10-09 NOTE — TELEPHONE ENCOUNTER
Pt states that he has a bad dry cough for the past 2 day and it's hurting his throat. He states that he has missed the past 2 days of work and has tried OTC med that has not helped. Please advise.

## 2020-10-09 NOTE — TELEPHONE ENCOUNTER
Patient has been schedule for a virtual visit on Monday 10- at 11:00 am. Kelsea Bowen would still like advise (see previous message)

## 2020-10-12 ENCOUNTER — VIRTUAL VISIT (OUTPATIENT)
Dept: FAMILY MEDICINE CLINIC | Age: 40
End: 2020-10-12
Payer: COMMERCIAL

## 2020-10-12 DIAGNOSIS — J40 BRONCHITIS: Primary | ICD-10-CM

## 2020-10-12 PROCEDURE — 99213 OFFICE O/P EST LOW 20 MIN: CPT | Performed by: FAMILY MEDICINE

## 2020-10-12 NOTE — PROGRESS NOTES
Ivan Rubalcava, who was evaluated through a synchronous (real-time) audio-video encounter, and/or his healthcare decision maker, is aware that it is a billable service, with coverage as determined by his insurance carrier. He provided verbal consent to proceed: Yes, and patient identification was verified. It was conducted pursuant to the emergency declaration under the [address] [place] [address] authority and the [employer] Act. A caregiver was present when appropriate. Ability to conduct physical exam was limited. I was in the office. The patient was at home. SUBJECTIVE  Chief Complaint   Patient presents with    Cough     The patient presents complaining of a 5-6 day history of cough. The cough is described as imp[roving from late last week. Was coughing up mucus. The patient does have nasal congestion and drainage. The patient denies sinus pressure. The patient denies fevers. The patient denies shortness of breath, chest pain, chest tightness, or wheezing. The patient has tried [place] Cold and Theraflu with significant relief. No known close COVID-19 contacts. ROS:  HEENT:  No loss of taste or smell. Cardiac:  No chest pain or palpitations. GI: The patient denies any nausea or vomiting. No diarrhea or abdominal pain. OBJECTIVE    General:  Alert, cooperative, well appearing, in no apparent distress. Lungs: Inspiratory and expiratory efforts are full and unlabored. Lung sounds are clear and equal to auscultation throughout all lung fields without rhonchi, wheezing or rales. Psych: normal affect. Mood good. Oriented x 3. Judgement and insight intact. ASSESSMENT / PLAN    ICD-10-CM ICD-9-CM    1. Bronchitis  J40 490      Monitor symptoms to resolution. We will also use OTC meds as directed as he already is.   Since we are in the midst of a pandemic, he will stay home for at least 10 days and quarantine. Work note faxed per his request.  The patient was instructed to follow-up if their condition worsened or persisted. All chart history elements were reviewed by me at the time of the visit even though marked at time of note closure. Patient understands our medical plan. Patient has provided input and agrees with goals. Alternatives have been explained and offered. All questions answered. The patient is to call if condition worsens or fails to improve. RTC as scheduled.

## 2020-10-12 NOTE — PATIENT INSTRUCTIONS
Bronchitis: Care Instructions Your Care Instructions Bronchitis is inflammation of the bronchial tubes, which carry air to the lungs. The tubes swell and produce mucus, or phlegm. The mucus and inflamed bronchial tubes make you cough. You may have trouble breathing. Most cases of bronchitis are caused by viruses like those that cause colds. Antibiotics usually do not help and they may be harmful. Bronchitis usually develops rapidly and lasts about 2 to 3 weeks in otherwise healthy people. Follow-up care is a key part of your treatment and safety. Be sure to make and go to all appointments, and call your doctor if you are having problems. It's also a good idea to know your test results and keep a list of the medicines you take. How can you care for yourself at home? · Take all medicines exactly as prescribed. Call your doctor if you think you are having a problem with your medicine. · Get some extra rest. 
· Take an over-the-counter pain medicine, such as acetaminophen (Tylenol), ibuprofen (Advil, Motrin), or naproxen (Aleve) to reduce fever and relieve body aches. Read and follow all instructions on the label. · Do not take two or more pain medicines at the same time unless the doctor told you to. Many pain medicines have acetaminophen, which is Tylenol. Too much acetaminophen (Tylenol) can be harmful. · Take an over-the-counter cough medicine that contains dextromethorphan to help quiet a dry, hacking cough so that you can sleep. Avoid cough medicines that have more than one active ingredient. Read and follow all instructions on the label. · Breathe moist air from a humidifier, hot shower, or sink filled with hot water. The heat and moisture will thin mucus so you can cough it out. · Do not smoke. Smoking can make bronchitis worse. If you need help quitting, talk to your doctor about stop-smoking programs and medicines. These can increase your chances of quitting for good. When should you call for help? Call 911 anytime you think you may need emergency care. For example, call if: 
  · You have severe trouble breathing. Call your doctor now or seek immediate medical care if: 
  · You have new or worse trouble breathing.  
  · You cough up dark brown or bloody mucus (sputum).  
  · You have a new or higher fever.  
  · You have a new rash. Watch closely for changes in your health, and be sure to contact your doctor if: 
  · You cough more deeply or more often, especially if you notice more mucus or a change in the color of your mucus.  
  · You are not getting better as expected. Where can you learn more? Go to http://www.gray.com/ Enter H333 in the search box to learn more about \"Bronchitis: Care Instructions. \" Current as of: February 24, 2020               Content Version: 12.6 © 5862-6484 [employer]. Care instructions adapted under license by [employer] (which disclaims liability or warranty for this information). If you have questions about a medical condition or this instruction, always ask your healthcare professional. [address] any warranty or liability for your use of this information.

## 2020-10-12 NOTE — LETTER
10/12/2020 11:13 AM 
 
Mr. Reji Solorio Daron. [address] [facility] [address] [place] [id number] To Whom It May Concern, We saw Mr. Rubalcava virtually for his URI symptoms. We diagnosed him with bronchitis. While I doubt this is covid-related, we are taking precautions by recommended that he stays home for at least 10 days from onset of symptoms. He is okay to return to work on Monday 10/19/2020 as long as he is feeling significantly better without fevers. Please excuse from his first reported absence on 10/7/2020. Sincerely, Noelle Palacio MD

## 2020-12-09 ENCOUNTER — HOSPITAL ENCOUNTER (OUTPATIENT)
Dept: LAB | Age: 40
Discharge: HOME | End: 2020-12-09

## 2020-12-09 LAB — XX-LABCORP SPECIMEN COL,LCBCF: NORMAL

## 2020-12-09 PROCEDURE — 99001 SPECIMEN HANDLING PT-LAB: CPT

## 2020-12-10 ENCOUNTER — VIRTUAL VISIT (OUTPATIENT)
Dept: FAMILY MEDICINE CLINIC | Age: 40
End: 2020-12-10
Payer: COMMERCIAL

## 2020-12-10 DIAGNOSIS — E78.2 MIXED HYPERLIPIDEMIA: ICD-10-CM

## 2020-12-10 DIAGNOSIS — R11.0 NAUSEA: ICD-10-CM

## 2020-12-10 DIAGNOSIS — K62.5 BRBPR (BRIGHT RED BLOOD PER RECTUM): ICD-10-CM

## 2020-12-10 LAB
ALBUMIN SERPL-MCNC: 4.6 G/DL (ref 4–5)
ALBUMIN/CREAT UR: 24 MG/G CREAT (ref 0–29)
ALBUMIN/GLOB SERPL: 1.4 {RATIO} (ref 1.2–2.2)
ALP SERPL-CCNC: 85 IU/L (ref 39–117)
ALT SERPL-CCNC: 38 IU/L (ref 0–44)
AST SERPL-CCNC: 18 IU/L (ref 0–40)
BASOPHILS # BLD AUTO: 0 X10E3/UL (ref 0–0.2)
BASOPHILS NFR BLD AUTO: 0 %
BILIRUB SERPL-MCNC: 0.3 MG/DL (ref 0–1.2)
BUN SERPL-MCNC: 13 MG/DL (ref 6–24)
BUN/CREAT SERPL: 11 (ref 9–20)
CALCIUM SERPL-MCNC: 9.8 MG/DL (ref 8.7–10.2)
CHLORIDE SERPL-SCNC: 101 MMOL/L (ref 96–106)
CHOLEST SERPL-MCNC: 247 MG/DL (ref 100–199)
CO2 SERPL-SCNC: 24 MMOL/L (ref 20–29)
CREAT SERPL-MCNC: 1.16 MG/DL (ref 0.76–1.27)
CREAT UR-MCNC: 220.6 MG/DL
EOSINOPHIL # BLD AUTO: 0.2 X10E3/UL (ref 0–0.4)
EOSINOPHIL NFR BLD AUTO: 2 %
ERYTHROCYTE [DISTWIDTH] IN BLOOD BY AUTOMATED COUNT: 13.6 % (ref 11.6–15.4)
EST. AVERAGE GLUCOSE BLD GHB EST-MCNC: 203 MG/DL
GLOBULIN SER CALC-MCNC: 3.2 G/DL (ref 1.5–4.5)
GLUCOSE SERPL-MCNC: 178 MG/DL (ref 65–99)
HBA1C MFR BLD: 8.7 % (ref 4.8–5.6)
HCT VFR BLD AUTO: 47.5 % (ref 37.5–51)
HDLC SERPL-MCNC: 32 MG/DL
HGB BLD-MCNC: 15.6 G/DL (ref 13–17.7)
IMM GRANULOCYTES # BLD AUTO: 0 X10E3/UL (ref 0–0.1)
IMM GRANULOCYTES NFR BLD AUTO: 0 %
INTERPRETATION, 910389: NORMAL
LDLC SERPL CALC-MCNC: 158 MG/DL (ref 0–99)
LYMPHOCYTES # BLD AUTO: 2.6 X10E3/UL (ref 0.7–3.1)
LYMPHOCYTES NFR BLD AUTO: 27 %
Lab: NORMAL
MCH RBC QN AUTO: 27.6 PG (ref 26.6–33)
MCHC RBC AUTO-ENTMCNC: 32.8 G/DL (ref 31.5–35.7)
MCV RBC AUTO: 84 FL (ref 79–97)
MICROALBUMIN UR-MCNC: 54 UG/ML
MONOCYTES # BLD AUTO: 0.6 X10E3/UL (ref 0.1–0.9)
MONOCYTES NFR BLD AUTO: 6 %
NEUTROPHILS # BLD AUTO: 6.3 X10E3/UL (ref 1.4–7)
NEUTROPHILS NFR BLD AUTO: 65 %
PLATELET # BLD AUTO: 279 X10E3/UL (ref 150–450)
POTASSIUM SERPL-SCNC: 4.3 MMOL/L (ref 3.5–5.2)
PROT SERPL-MCNC: 7.8 G/DL (ref 6–8.5)
RBC # BLD AUTO: 5.65 X10E6/UL (ref 4.14–5.8)
SODIUM SERPL-SCNC: 140 MMOL/L (ref 134–144)
TRIGL SERPL-MCNC: 305 MG/DL (ref 0–149)
VLDLC SERPL CALC-MCNC: 57 MG/DL (ref 5–40)
WBC # BLD AUTO: 9.8 X10E3/UL (ref 3.4–10.8)

## 2020-12-10 PROCEDURE — 99213 OFFICE O/P EST LOW 20 MIN: CPT | Performed by: FAMILY MEDICINE

## 2020-12-10 RX ORDER — ONDANSETRON 8 MG/1
8 TABLET, ORALLY DISINTEGRATING ORAL
Qty: 10 TAB | Refills: 2 | Status: SHIPPED | OUTPATIENT
Start: 2020-12-10 | End: 2021-08-10

## 2020-12-10 RX ORDER — GLYBURIDE 5 MG/1
10 TABLET ORAL
Qty: 180 TAB | Refills: 0 | Status: SHIPPED | OUTPATIENT
Start: 2020-12-10 | End: 2021-04-07 | Stop reason: SDUPTHER

## 2020-12-10 RX ORDER — ATORVASTATIN CALCIUM 80 MG/1
80 TABLET, FILM COATED ORAL
Qty: 90 TAB | Refills: 0 | Status: SHIPPED | OUTPATIENT
Start: 2020-12-10 | End: 2021-04-07 | Stop reason: SDUPTHER

## 2020-12-10 NOTE — PROGRESS NOTES
Ivan Rubalcava, who was evaluated through a synchronous (real-time) audio encounter, and/or his healthcare decision maker, is aware that it is a billable service, with coverage as determined by his insurance carrier. He provided verbal consent to proceed: Yes, and patient identification was verified. It was conducted pursuant to the emergency declaration under the [facility], [facility] and the [place] [employer] and [employer] General Act. A caregiver was present when appropriate. Ability to conduct physical exam was limited. I was in the office. The patient was at home. SUBJECTIVE    Chief Complaint   Patient presents with    Diabetes     Patient presents for follow-up of diabetes. Diabetes -  Type 2, worsening once again. Was worsening trend at his first visit with me in Sept, now even worse. At that time he was off glyburide. He says he is 100% compliant at this time. Patient is taking Trulicity 1.5 mg weekly. Says he has started getting injection site pain and N/V 2-3 hours later that lasts a few hours. Home glucose readings:  not consistently doing. Patient denies symptomatic hypo- or hyperglycemia since last visit. Denies headaches, lightheadedness, dizziness, chest pain, SOB, heart palpitations, nausea, vomiting, change in BMs, urinary frequency/urgency, skin changes/wounds, sensory disturbances       Hyperlipidemia -   Recent LDL noted to be elevated. Patient in the past admitted he had not been taking his cholesterol medication but now says he does all the time. No side effects. ROS:  History obtained from the patient   · General: negative for - chills, fever  · Respiratory: no cough, shortness of breath  · Cardiovascular: no chest pain or dyspnea on exertion  · Gastrointestinal: no abdominal pain or change in bowel habits, or black stools. No more BRBPR reported and he was referred to GI.   He did see them and told them about his N/V with Trulicity. Their notes admit they did not want to address that since that was not the reason for his visit. They wanted only to deal with the one complaint. They did not pursue a work-up for BRBPR thinking it had either resolved or was not real.   · Neurological: no numbness, tingling  · Endo:  No polyuria or polydipsia. OBJECTIVE    General:  Alert, cooperative, well appearing, in no apparent distress. Psych: normal affect. Mood good. Oriented x 3. Judgement and insight intact. LABS FROM [facility] not interfacing but we did get his A1c and LDL levels which showed poor control. A1c over 8% and LDL over 130. See scanned copy for exact results. ASSESSMENT / PLAN    ICD-10-CM ICD-9-CM    1. Uncontrolled type 2 diabetes mellitus with microalbuminuria (HCC)  E11.29 250.42 glyBURIDE (DIABETA) 5 mg tablet    E11.65 583.81 dulaglutide (TRULICITY) 1.5 UD/6.4 mL sub-q pen    R80.9  HEMOGLOBIN A1C WITH EAG   2. Mixed hyperlipidemia  E78.2 272.2 atorvastatin (LIPITOR) 80 mg tablet      HEPATIC FUNCTION PANEL      LIPID PANEL   3. Nausea  R11.0 787.02 ondansetron (ZOFRAN ODT) 8 mg disintegrating tablet   4. BRBPR (bright red blood per rectum)  K62.5 569.3      Uncontrolled type 2 diabetes mellitus with microalbuminuria ([facility] [address]  - He is reporting better compliance but his numbers have decompensated even more. This is very unusual.    - inc glyburide to 10mg daily.  -Cont trulicity, adding zofran after administration for short term. - Work on diet & exercise   - Repeat fasting labs prior to next visit  -need to obtain eye exam but he still cannot tell us the eye doctor's name  -he has opted not to do an ACE inhibitor     Mixed hyperlipidemia  - Poorly controlled  - increase to Lipitor 80mg nightly. - Counseled on importance of compliance with Lipitor  - repeat fasting labs prior to next visit along with LFTs.     Nausea / Radha Bogus   -reviewed GI notes.  -unfortunately the specialist was not interested in dealing with more than one presenting complaint. 15 minutes of phone time spent with the patient with at least 50% on counseling on above medical issues. All chart history elements were reviewed by me at the time of the visit even though marked at time of note closure. Patient understands our medical plan. Patient has provided input and agrees with goals. Alternatives have been explained and offered. All questions answered. The patient is to call if condition worsens or fails to improve. RTC in 3 months, fasting labs prior.

## 2020-12-10 NOTE — PROGRESS NOTES
1. Have you been to the ER, urgent care clinic since your last visit? Hospitalized since your last visit? No    2. Have you seen or consulted any other health care providers outside of the [address] [place] [address] since your last visit? Include any pap smears or colon screening. No    Patient reports that Trulicity is causing abdominal pain and vomiting. He has not stopped taking the medication. Patient also reports that blood sugars have been running in the upper 200s.

## 2020-12-10 NOTE — PATIENT INSTRUCTIONS

## 2020-12-16 ENCOUNTER — TELEPHONE (OUTPATIENT)
Dept: FAMILY MEDICINE CLINIC | Age: 40
End: 2020-12-16

## 2020-12-17 ENCOUNTER — CLINICAL SUPPORT (OUTPATIENT)
Dept: FAMILY MEDICINE CLINIC | Age: 40
End: 2020-12-17
Payer: COMMERCIAL

## 2020-12-17 VITALS — TEMPERATURE: 98.3 F | HEIGHT: 69 IN | BODY MASS INDEX: 32.29 KG/M2 | WEIGHT: 218 LBS

## 2020-12-17 DIAGNOSIS — Z23 ENCOUNTER FOR IMMUNIZATION: Primary | ICD-10-CM

## 2020-12-17 PROCEDURE — 90471 IMMUNIZATION ADMIN: CPT | Performed by: FAMILY MEDICINE

## 2020-12-17 PROCEDURE — 90686 IIV4 VACC NO PRSV 0.5 ML IM: CPT | Performed by: FAMILY MEDICINE

## 2020-12-17 NOTE — PATIENT INSTRUCTIONS
Vaccine Information Statement Influenza (Flu) Vaccine (Inactivated or Recombinant): What you need to know Many Vaccine Information Statements are available in [place]ian and other languages. See www.immunize.org/vis Hojas de Información Sobre Vacunas están disponibles en Español y en muchos otros idiomas. Visite www.immunize.org/vis 1. Why get vaccinated? Influenza (flu) is a contagious disease that spreads around the [place] every year, usually between October and May. Flu is caused by influenza viruses, and is spread mainly by coughing, sneezing, and close contact. Anyone can get flu. Flu strikes suddenly and can last several days. Symptoms vary by age, but can include: 
 fever/chills  sore throat  muscle aches  fatigue  cough  headache  runny or stuffy nose Flu can also lead to pneumonia and blood infections, and cause diarrhea and seizures in children. If you have a medical condition, such as heart or lung disease, flu can make it worse. Flu is more dangerous for some people. Infants and young children, people 72years of age and older, pregnant women, and people with certain health conditions or a weakened immune system are at greatest risk. Each year thousands of people in the [facility] die from flu, and many more are hospitalized. Flu vaccine can: 
 keep you from getting flu, 
 make flu less severe if you do get it, and 
 keep you from spreading flu to your family and other people. 2. Inactivated and recombinant flu vaccines A dose of flu vaccine is recommended every flu season. Children 6 months through 6years of age may need two doses during the same flu season. Everyone else needs only one dose each flu season. Some inactivated flu vaccines contain a very small amount of a mercury-based preservative called thimerosal. Studies have not shown thimerosal in vaccines to be harmful, but flu vaccines that do not contain thimerosal are available. There is no live flu virus in flu shots. They cannot cause the flu. There are many flu viruses, and they are always changing. Each year a new flu vaccine is made to protect against three or four viruses that are likely to cause disease in the upcoming flu season. But even when the vaccine doesnt exactly match these viruses, it may still provide some protection Flu vaccine cannot prevent: 
 flu that is caused by a virus not covered by the vaccine, or 
 illnesses that look like flu but are not. It takes about 2 weeks for protection to develop after vaccination, and protection lasts through the flu season. 3. Some people should not get this vaccine Tell the person who is giving you the vaccine:  If you have any severe, life-threatening allergies. If you ever had a life-threatening allergic reaction after a dose of flu vaccine, or have a severe allergy to any part of this vaccine, you may be advised not to get vaccinated. Most, but not all, types of flu vaccine contain a small amount of egg protein.  If you ever had Guillain-Barré Syndrome (also called GBS). Some people with a history of GBS should not get this vaccine. This should be discussed with your doctor.  If you are not feeling well. It is usually okay to get flu vaccine when you have a mild illness, but you might be asked to come back when you feel better. 4. Risks of a vaccine reaction With any medicine, including vaccines, there is a chance of reactions. These are usually mild and go away on their own, but serious reactions are also possible. Most people who get a flu shot do not have any problems with it. Minor problems following a flu shot include:  soreness, redness, or swelling where the shot was given  hoarseness  sore, red or itchy eyes  cough  fever  aches  headache  itching  fatigue If these problems occur, they usually begin soon after the shot and last 1 or 2 days. More serious problems following a flu shot can include the following:  There may be a small increased risk of Guillain-Barré Syndrome (GBS) after inactivated flu vaccine. This risk has been estimated at 1 or 2 additional cases per million people vaccinated. This is much lower than the risk of severe complications from flu, which can be prevented by flu vaccine.  Young children who get the flu shot along with pneumococcal vaccine (PCV13) and/or DTaP vaccine at the same time might be slightly more likely to have a seizure caused by fever. Ask your doctor for more information. Tell your doctor if a child who is getting flu vaccine has ever had a seizure. Problems that could happen after any injected vaccine:  People sometimes faint after a medical procedure, including vaccination. Sitting or lying down for about 15 minutes can help prevent fainting, and injuries caused by a fall. Tell your doctor if you feel dizzy, or have vision changes or ringing in the ears.  Some people get severe pain in the shoulder and have difficulty moving the arm where a shot was given. This happens very rarely.  Any medication can cause a severe allergic reaction. Such reactions from a vaccine are very rare, estimated at about 1 in a million doses, and would happen within a few minutes to a few hours after the vaccination. As with any medicine, there is a very remote chance of a vaccine causing a serious injury or death. The safety of vaccines is always being monitored. For more information, visit: www.cdc.gov/vaccinesafety/ 
 
5. What if there is a serious reaction? What should I look for?  Look for anything that concerns you, such as signs of a severe allergic reaction, very high fever, or unusual behavior. Signs of a severe allergic reaction can include hives, swelling of the face and throat, difficulty breathing, a fast heartbeat, dizziness, and weakness  usually within a few minutes to a few hours after the vaccination. What should I do?  If you think it is a severe allergic reaction or other emergency that cant wait, call 9-1-1 and get the person to the nearest hospital. Otherwise, call your doctor.  Reactions should be reported to the Vaccine Adverse Event Reporting System (VAERS). Your doctor should file this report, or you can do it yourself through  the VAERS web site at www.vaers. [facility].gov, or by calling 4-813.779.9305. VAERS does not give medical advice. 6. The National Vaccine Injury Compensation Program 
 
The [facility] Vaccine Injury Compensation Program (VICP) is a federal program that was created to compensate people who may have been injured by certain vaccines. Persons who believe they may have been injured by a vaccine can learn about the program and about filing a claim by calling 8-429.466.2399 or visiting the [address] [place] [address] [address] website at www.[facility].gov/vaccinecompensation. There is a time limit to file a claim for compensation. 7. How can I learn more?  Ask your healthcare provider. He or she can give you the vaccine package insert or suggest other sources of information.  Call your local or state health department.  Contact the Centers for Disease Control and Prevention (CDC): 
- Call 9-363.304.3075 (1-800-CDC-INFO) or 
- Visit CDCs website at www.cdc.gov/flu Vaccine Information Statement Inactivated Influenza Vaccine 8/7/2015 
[address] ANTHON[address]oney Trang [id number]-74 [facility] and [employer] Centers for Disease Control and Prevention Office Use Only Dr. Ledezma Course [phone] (Please call to schedule diabetic eye exam)

## 2020-12-17 NOTE — PROGRESS NOTES
1. Have you been to the ER, urgent care clinic since your last visit? Hospitalized since your last visit? No    2. Have you seen or consulted any other health care providers outside of the [address] [place] [address] since your last visit? Include any pap smears or colon screening. No     Physician order obtained. Patient completed adult immunization consent form. Allergies, contraindications and recommendations reviewed with patient. Seasonal influenza vaccine administered IM right deltoid. Patient tolerated well. Patient remained in office for 15 minutes after injection and no adverse reactions were noted.     Lot # [id number]  Exp Date:June 49,5460  Jaki Perla 47 # [id number]      Health Maintenance Due   Topic Date Due    DTaP/Tdap/Td series (1 - Tdap) 08/23/2000    Foot Exam Q1  06/12/2020    Eye Exam Retinal or Dilated  08/16/2020      Ivan was advised during clinical visit to call Dr. Darlene Toro to schedule an diabetic eye exam.

## 2021-03-31 NOTE — TELEPHONE ENCOUNTER
This patient contacted office for the following prescriptions to be filled:    Medication requested : .  Requested Prescriptions     Pending Prescriptions Disp Refills    dulaglutide (TRULICITY) 1.5 EB/2.7 mL sub-q pen 12 Syringe 0     Si.5 mL by SubCUTAneous route every seven (7) days.      PCP: Bryn Thomas 39. or Print: Summer Bhakta order or Local pharmacy [facility] [address]    Scheduled appointment if not seen by current providers in office: LOV 12/10/2020 f/u 2021

## 2021-04-05 ENCOUNTER — HOSPITAL ENCOUNTER (OUTPATIENT)
Dept: LAB | Age: 41
Discharge: HOME | End: 2021-04-05

## 2021-04-05 LAB — XX-LABCORP SPECIMEN COL,LCBCF: NORMAL

## 2021-04-05 PROCEDURE — 99001 SPECIMEN HANDLING PT-LAB: CPT

## 2021-04-06 ENCOUNTER — TELEPHONE (OUTPATIENT)
Dept: FAMILY MEDICINE CLINIC | Age: 41
End: 2021-04-06

## 2021-04-06 LAB
ALBUMIN SERPL-MCNC: 4.9 G/DL (ref 4–5)
ALP SERPL-CCNC: 80 IU/L (ref 39–117)
ALT SERPL-CCNC: 26 IU/L (ref 0–44)
AST SERPL-CCNC: 12 IU/L (ref 0–40)
BILIRUB DIRECT SERPL-MCNC: 0.08 MG/DL (ref 0–0.4)
BILIRUB SERPL-MCNC: 0.2 MG/DL (ref 0–1.2)
CHOLEST SERPL-MCNC: 258 MG/DL (ref 100–199)
EST. AVERAGE GLUCOSE BLD GHB EST-MCNC: 177 MG/DL
HBA1C MFR BLD: 7.8 % (ref 4.8–5.6)
HDLC SERPL-MCNC: 34 MG/DL
IMP & REVIEW OF LAB RESULTS: NORMAL
LDLC SERPL CALC-MCNC: 171 MG/DL (ref 0–99)
Lab: NORMAL
PROT SERPL-MCNC: 7.6 G/DL (ref 6–8.5)
TRIGL SERPL-MCNC: 278 MG/DL (ref 0–149)
VLDLC SERPL CALC-MCNC: 53 MG/DL (ref 5–40)

## 2021-04-07 ENCOUNTER — OFFICE VISIT (OUTPATIENT)
Dept: FAMILY MEDICINE CLINIC | Age: 41
End: 2021-04-07
Payer: COMMERCIAL

## 2021-04-07 VITALS
TEMPERATURE: 97.7 F | OXYGEN SATURATION: 96 % | HEART RATE: 97 BPM | HEIGHT: 70 IN | WEIGHT: 210 LBS | BODY MASS INDEX: 30.06 KG/M2 | SYSTOLIC BLOOD PRESSURE: 112 MMHG | RESPIRATION RATE: 16 BRPM | DIASTOLIC BLOOD PRESSURE: 84 MMHG

## 2021-04-07 DIAGNOSIS — Z11.59 ENCOUNTER FOR HEPATITIS C SCREENING TEST FOR LOW RISK PATIENT: ICD-10-CM

## 2021-04-07 DIAGNOSIS — E78.2 MIXED HYPERLIPIDEMIA: ICD-10-CM

## 2021-04-07 DIAGNOSIS — Z91.199 MEDICALLY NONCOMPLIANT: ICD-10-CM

## 2021-04-07 DIAGNOSIS — I10 ESSENTIAL HYPERTENSION: ICD-10-CM

## 2021-04-07 PROCEDURE — 3051F HG A1C>EQUAL 7.0%<8.0%: CPT | Performed by: FAMILY MEDICINE

## 2021-04-07 PROCEDURE — 99214 OFFICE O/P EST MOD 30 MIN: CPT | Performed by: FAMILY MEDICINE

## 2021-04-07 RX ORDER — GLYBURIDE 5 MG/1
10 TABLET ORAL
Qty: 60 TAB | Refills: 3 | Status: SHIPPED | OUTPATIENT
Start: 2021-04-07 | End: 2021-08-10 | Stop reason: SDUPTHER

## 2021-04-07 RX ORDER — ATORVASTATIN CALCIUM 80 MG/1
80 TABLET, FILM COATED ORAL
Qty: 30 TAB | Refills: 3 | Status: SHIPPED | OUTPATIENT
Start: 2021-04-07 | End: 2021-11-11 | Stop reason: SDUPTHER

## 2021-04-07 NOTE — PROGRESS NOTES
1. Have you been to the ER, urgent care clinic since your last visit? Hospitalized since your last visit? No    2. Have you seen or consulted any other health care providers outside of the [address] [place] [address] since your last visit? Include any pap smears or colon screening.  No

## 2021-04-07 NOTE — PATIENT INSTRUCTIONS
Nutrition Tips for Diabetes in Children: Care Instructions Overview When your child has diabetes, it's very important to keep your child's blood sugar in their target range. This means that you need to pay attention to how often and how much your child eats certain foods. But your child can still eat what your family eats. This includes occasional sweets and other favorites. Make sure that your child eats a variety of foods. Follow your child's meal plan to know how much carbohydrate your child needs for meals and snacks. Carbohydrate raises blood sugar more than any other nutrient. It's found in sugar, breads, and cereals. It's also found in fruit, starchy vegetables like potatoes and corn, milk, and yogurt. You may want to plan your child's meals and snacks with a dietitian or diabetes educator. They can help you choose the best foods and help with weight loss, if that's a goal. There are lots of things you can do to help your child enjoy meals and stay healthy. Follow-up care is a key part of your child's treatment and safety. Be sure to make and go to all appointments, and call your doctor if your child is having problems. It's also a good idea to know your child's test results and keep a list of the medicines your child takes. How can you care for your child at home? · Learn which foods have carbohydrate (carbs). And learn how much is okay for your child. A dietitian or diabetes educator can help you and your child keep track of carbs. · Follow your child's meal plan to know how much carbohydrate your child needs for meals and snacks. · If your child needs mealtime insulin, you might be taught to adjust the amount of insulin needed to cover the amount of carbohydrate your child eats. · Plan meals to include a variety of foods. This includes grains, fruit, vegetables, dairy, and protein foods. · Use the plate format to plan your child's meals.  It is a good, quick way to make sure that your child has a balanced meal. Divide your child's plate by types of foods. Put vegetables on half the plate. Put meat or other proteins on one-quarter of the plate. And put a grain or starchy vegetable (such as brown rice or a potato) in the last quarter. · Talk to your dietitian or diabetes educator about ways to add limited sweets. Your child can eat sweets once in a while. But you need to count the amount of carbs as part the daily amount. · Plan meals to include foods that contain some protein, fat, and fiber. These foods don't raise blood sugar as much as carbohydrates do. When your child eats out · It's a good idea for you and your child to learn to estimate the serving sizes of foods that have carbohydrate. If you measure food at home, it will be easier to estimate the amount in a serving of restaurant food. · If the meal you order for your child has too much carbohydrate (such as potatoes, corn, or baked beans), ask to have a low-carbohydrate food instead. Ask for a salad or green vegetables. · If your child uses insulin, check your child's blood sugar before and after eating out. This can help you and your child plan how much to eat in the future. Where can you learn more? Go to http://www.gray.com/ Enter P102 in the search box to learn more about \"Nutrition Tips for Diabetes in Children: Care Instructions. \" Current as of: August 31, 2020               Content Version: 12.8 © 7780-7041 Healthwise, [facility]. Care instructions adapted under license by [employer] (which disclaims liability or warranty for this information). If you have questions about a medical condition or this instruction, always ask your healthcare professional. [place] [address] any warranty or liability for your use of this information.

## 2021-04-07 NOTE — PROGRESS NOTES
Chief Complaint   Patient presents with    Diabetes     SUBJECTIVE    Patient presents for follow-up of diabetes. He is overdue with his last appt being a no-show. Diabetes -  Diabetes has improved some. He stopped his glyburide 2-3 weeks ago and was spotty at taking it. His lowest blood sugar on it was 107. Patient is taking Trulicity 1.5 mg weekly. He says that he is consistent with this. Home glucose readings:  not consistently doing. Patient denies symptomatic hypo- or hyperglycemia since last visit. Denies headaches, lightheadedness, dizziness, chest pain, SOB, heart palpitations, nausea, vomiting, change in BMs, urinary frequency/urgency, skin changes/wounds, sensory disturbances     Says his father had several diabetic complications to include dialysis and amputations.      Hyperlipidemia -   Recent LDL noted to be elevated. Patient admits he has not been taking his cholesterol medication. No side effects when he was taking it. ROS:  History obtained from the patient   · Respiratory: no cough, shortness of breath  · Cardiovascular: no chest pain or dyspnea on exertion  · Gastrointestinal: no abdominal pain or change in bowel habits, or black stools. No more BRBPR. Saw GI in the past and declined work-up. · Neurological: no numbness, tingling  · Endo:  No polyuria or polydipsia. OBJECTIVE  Blood pressure 112/84, pulse 97, temperature 97.7 °F (36.5 °C), temperature source Temporal, resp. rate 16, height 5' 10\" (1.778 m), weight 210 lb (95.3 kg), SpO2 96 %. General:  Alert, cooperative, well appearing, in no apparent distress. Chest: Clear, no w/r/r. Heart: normal S1S2, RRR  Ext / feet:  No swelling. No deformity. Monofilament testing intact. Vascularly intact. Psych: normal affect. Mood good. Oriented x 3. Judgement and insight intact. Results for Jaye Sloan (MRN [id number]) as of 4/7/2021 15:22   Ref.  Range 4/5/2021 00:00   Bilirubin, total Latest Ref Range: 0.0 - 1.2 mg/dL 0.2   Bilirubin, direct Latest Ref Range: 0.00 - 0.40 mg/dL 0.08   Protein, total Latest Ref Range: 6.0 - 8.5 g/dL 7.6   Albumin Latest Ref Range: 4.0 - 5.0 g/dL 4.9   ALT Latest Ref Range: 0 - 44 IU/L 26   AST Latest Ref Range: 0 - 40 IU/L 12   Alk. phosphatase Latest Ref Range: 39 - 117 IU/L 80   Triglyceride Latest Ref Range: 0 - 149 mg/dL 278 (H)   Cholesterol, total Latest Ref Range: 100 - 199 mg/dL 258 (H)   HDL Cholesterol Latest Ref Range: >39 mg/dL 34 (L)   VLDL, calculated Latest Ref Range: 5 - 40 mg/dL 53 (H)   LDL, calculated Latest Ref Range: 0 - 99 mg/dL 171 (H)   Hemoglobin A1c, (calculated) Latest Ref Range: 4.8 - 5.6 % 7.8 (H)   Estimated average glucose Latest Units: mg/dL 177       ASSESSMENT / PLAN    ICD-10-CM ICD-9-CM    1. Uncontrolled type 2 diabetes mellitus with microalbuminuria (HCC)  E11.29 250.42 glyBURIDE (DIABETA) 5 mg tablet    E11.65 583.81     R80.9     2. Essential hypertension  I10 401.9    3. Mixed hyperlipidemia  E78.2 272.2 atorvastatin (LIPITOR) 80 mg tablet   4. Medically noncompliant  Z91.19 V15.81      Uncontrolled type 2 diabetes mellitus with microalbuminuria ([facility] [address]  - Numbers improving some   - get back on glyburide to 10mg daily. - Cont trulicity, adding zofran after administration as needed. - Work on diet & exercise   - Repeat fasting labs prior to next visit  - annual diabetic eye exams  - he has opted not to do an ACE inhibitor  -Advised him that his noncompliance places him at great risk for complications.     HTN   -controlled off meds. Mixed hyperlipidemia  - Poorly controlled due to noncompliance  - start back on Lipitor 80mg nightly. - Counseled on importance of compliance with Lipitor    All chart history elements were reviewed by me at the time of the visit even though marked at time of note closure. Patient understands our medical plan. Patient has provided input and agrees with goals. Alternatives have been explained and offered.   All questions answered. The patient is to call if condition worsens or fails to improve. Follow-up and Dispositions    · Return in about 3 months (around 7/7/2021). fasting labs prior.

## 2021-07-07 ENCOUNTER — TELEPHONE (OUTPATIENT)
Dept: FAMILY MEDICINE CLINIC | Age: 41
End: 2021-07-07

## 2021-07-08 RX ORDER — BLOOD SUGAR DIAGNOSTIC
STRIP MISCELLANEOUS
Qty: 200 STRIP | Refills: 11 | Status: SHIPPED | OUTPATIENT
Start: 2021-07-08

## 2021-07-08 NOTE — TELEPHONE ENCOUNTER
Pt's sister called about the test strips requested yesterday . Advised that there is a 72 hr turn around on all refill request. She states that pt is out of the strips . Please send RX to for Strips to [facility]. She states that his machine is [facility].  Thank you

## 2021-07-08 NOTE — TELEPHONE ENCOUNTER
Last OV: 4/7/2021  Last labs: 4/5/2021  Next OV and labs: 8/10/2021    Testing strips sent in to pharmacy.

## 2021-08-09 ENCOUNTER — HOSPITAL ENCOUNTER (OUTPATIENT)
Dept: LAB | Age: 41
Discharge: HOME | End: 2021-08-09

## 2021-08-09 LAB — XX-LABCORP SPECIMEN COL,LCBCF: NORMAL

## 2021-08-09 PROCEDURE — 99001 SPECIMEN HANDLING PT-LAB: CPT

## 2021-08-10 ENCOUNTER — OFFICE VISIT (OUTPATIENT)
Dept: FAMILY MEDICINE CLINIC | Age: 41
End: 2021-08-10
Payer: MEDICAID

## 2021-08-10 VITALS
HEART RATE: 97 BPM | SYSTOLIC BLOOD PRESSURE: 110 MMHG | WEIGHT: 215 LBS | DIASTOLIC BLOOD PRESSURE: 64 MMHG | BODY MASS INDEX: 30.78 KG/M2 | OXYGEN SATURATION: 96 % | RESPIRATION RATE: 16 BRPM | TEMPERATURE: 97.5 F | HEIGHT: 70 IN

## 2021-08-10 DIAGNOSIS — I10 ESSENTIAL HYPERTENSION: ICD-10-CM

## 2021-08-10 DIAGNOSIS — E78.2 MIXED HYPERLIPIDEMIA: ICD-10-CM

## 2021-08-10 LAB
ALBUMIN SERPL-MCNC: 4.5 G/DL (ref 4–5)
ALBUMIN/GLOB SERPL: 1.7 {RATIO} (ref 1.2–2.2)
ALP SERPL-CCNC: 86 IU/L (ref 48–121)
ALT SERPL-CCNC: 37 IU/L (ref 0–44)
AST SERPL-CCNC: 17 IU/L (ref 0–40)
BILIRUB SERPL-MCNC: 0.2 MG/DL (ref 0–1.2)
BUN SERPL-MCNC: 14 MG/DL (ref 6–24)
BUN/CREAT SERPL: 14 (ref 9–20)
CALCIUM SERPL-MCNC: 9.6 MG/DL (ref 8.7–10.2)
CHLORIDE SERPL-SCNC: 104 MMOL/L (ref 96–106)
CHOLEST SERPL-MCNC: 239 MG/DL (ref 100–199)
CO2 SERPL-SCNC: 25 MMOL/L (ref 20–29)
CREAT SERPL-MCNC: 1.03 MG/DL (ref 0.76–1.27)
ERYTHROCYTE [DISTWIDTH] IN BLOOD BY AUTOMATED COUNT: 14.3 % (ref 11.6–15.4)
GLOBULIN SER CALC-MCNC: 2.7 G/DL (ref 1.5–4.5)
GLUCOSE SERPL-MCNC: 146 MG/DL (ref 65–99)
HBA1C MFR BLD: 7.8 % (ref 4.8–5.6)
HCT VFR BLD AUTO: 46.9 % (ref 37.5–51)
HCV AB S/CO SERPL IA: <0.1 S/CO RATIO (ref 0–0.9)
HDLC SERPL-MCNC: 33 MG/DL
HGB BLD-MCNC: 15.9 G/DL (ref 13–17.7)
IMP & REVIEW OF LAB RESULTS: NORMAL
LDLC SERPL CALC-MCNC: 157 MG/DL (ref 0–99)
MCH RBC QN AUTO: 29.8 PG (ref 26.6–33)
MCHC RBC AUTO-ENTMCNC: 33.9 G/DL (ref 31.5–35.7)
MCV RBC AUTO: 88 FL (ref 79–97)
PLATELET # BLD AUTO: 280 X10E3/UL (ref 150–450)
POTASSIUM SERPL-SCNC: 4.4 MMOL/L (ref 3.5–5.2)
PROT SERPL-MCNC: 7.2 G/DL (ref 6–8.5)
RBC # BLD AUTO: 5.34 X10E6/UL (ref 4.14–5.8)
SODIUM SERPL-SCNC: 141 MMOL/L (ref 134–144)
SPECIMEN STATUS REPORT, ROLRST: NORMAL
TRIGL SERPL-MCNC: 265 MG/DL (ref 0–149)
VLDLC SERPL CALC-MCNC: 49 MG/DL (ref 5–40)
WBC # BLD AUTO: 9.8 X10E3/UL (ref 3.4–10.8)

## 2021-08-10 PROCEDURE — 99214 OFFICE O/P EST MOD 30 MIN: CPT | Performed by: FAMILY MEDICINE

## 2021-08-10 PROCEDURE — 3051F HG A1C>EQUAL 7.0%<8.0%: CPT | Performed by: FAMILY MEDICINE

## 2021-08-10 RX ORDER — GLYBURIDE 5 MG/1
10 TABLET ORAL
Qty: 60 TABLET | Refills: 2 | Status: SHIPPED | OUTPATIENT
Start: 2021-08-10 | End: 2022-02-15 | Stop reason: SDUPTHER

## 2021-08-10 RX ORDER — EZETIMIBE 10 MG/1
10 TABLET ORAL
Qty: 30 TABLET | Refills: 2 | Status: SHIPPED | OUTPATIENT
Start: 2021-08-10 | End: 2021-11-11 | Stop reason: SDUPTHER

## 2021-08-10 NOTE — PROGRESS NOTES
Chief Complaint   Patient presents with    Results    Diabetes     SUBJECTIVE    Patient presents for follow-up of diabetes. Diabetes -  Diabetes has stayed sideways  He stopped his Trulicity 2 weeks ago citing side effects again of nausea about 30 minutes post injection. He says he is taking glyburide as prescribed. Home glucose readings:  not consistently doing. Patient denies symptomatic hypo- or hyperglycemia since last visit. Denies headaches, lightheadedness, dizziness, chest pain, SOB, heart palpitations, nausea, vomiting, change in BMs, urinary frequency/urgency, skin changes/wounds, sensory disturbances     Says his father had several diabetic complications to include dialysis and amputations.      Hyperlipidemia -   Recent LDL noted to be elevated. Patient says he has been taking his cholesterol medication. No side effects when he is taking it. OBJECTIVE  Blood pressure 110/64, pulse 97, temperature 97.5 °F (36.4 °C), temperature source Temporal, resp. rate 16, height 5' 10\" (1.778 m), weight 215 lb (97.5 kg), SpO2 96 %. General:  Alert, cooperative, well appearing, in no apparent distress. Chest: Clear, no w/r/r. Heart: normal S1S2, RRR  Ext / feet:  No swelling. No deformity. Psych: normal affect. Mood good. Oriented x 3. Judgement and insight intact. Results for Cristiano Olsen (MRN [id number]) as of 8/10/2021 16:31   Ref.  Range 8/9/2021 00:00   WBC Latest Ref Range: 3.4 - 10.8 x10E3/uL 9.8   RBC Latest Ref Range: 4.14 - 5.80 x10E6/uL 5.34   HGB Latest Ref Range: 13.0 - 17.7 g/dL 15.9   HCT Latest Ref Range: 37.5 - 51.0 % 46.9   MCV Latest Ref Range: 79 - 97 fL 88   MCH Latest Ref Range: 26.6 - 33.0 pg 29.8   MCHC Latest Ref Range: 31.5 - 35.7 g/dL 33.9   RDW Latest Ref Range: 11.6 - 15.4 % 14.3   PLATELET Latest Ref Range: 150 - 450 x10E3/uL 280   Sodium Latest Ref Range: 134 - 144 mmol/L 141   Potassium Latest Ref Range: 3.5 - 5.2 mmol/L 4.4   Chloride Latest Ref Range: 96 - 106 mmol/L 104   CO2 Latest Ref Range: 20 - 29 mmol/L 25   Glucose Latest Ref Range: 65 - 99 mg/dL 146 (H)   BUN Latest Ref Range: 6 - 24 mg/dL 14   Creatinine Latest Ref Range: 0.76 - 1.27 mg/dL 1.03   BUN/Creatinine ratio Latest Ref Range: 9 - 20  14   Calcium Latest Ref Range: 8.7 - 10.2 mg/dL 9.6   GFR est non-AA Latest Ref Range: >59 mL/min/1.73 90   GFR est AA Latest Ref Range: >59 mL/min/1.73 104   Bilirubin, total Latest Ref Range: 0.0 - 1.2 mg/dL 0.2   Protein, total Latest Ref Range: 6.0 - 8.5 g/dL 7.2   Albumin Latest Ref Range: 4.0 - 5.0 g/dL 4.5   A-G Ratio Latest Ref Range: 1.2 - 2.2  1.7   ALT Latest Ref Range: 0 - 44 IU/L 37   AST Latest Ref Range: 0 - 40 IU/L 17   Alk. phosphatase Latest Ref Range: 48 - 121 IU/L 86   Triglyceride Latest Ref Range: 0 - 149 mg/dL 265 (H)   Cholesterol, total Latest Ref Range: 100 - 199 mg/dL 239 (H)   HDL Cholesterol Latest Ref Range: >39 mg/dL 33 (L)   VLDL, calculated Latest Ref Range: 5 - 40 mg/dL 49 (H)   LDL, calculated Latest Ref Range: 0 - 99 mg/dL 157 (H)   Hemoglobin A1c, (calculated) Latest Ref Range: 4.8 - 5.6 % 7.8 (H)   HEPATITIS C AB Unknown Rpt       ASSESSMENT / PLAN    ICD-10-CM ICD-9-CM    1. Uncontrolled type 2 diabetes mellitus with microalbuminuria (HCC)  E11.29 250.42 glyBURIDE (DIABETA) 5 mg tablet    E11.65 583.81 empagliflozin (Jardiance) 25 mg tablet    R80.9  CBC WITH AUTOMATED DIFF      METABOLIC PANEL, COMPREHENSIVE      LIPID PANEL      HEMOGLOBIN A1C W/O EAG      MICROALBUMIN, UR, RAND W/ MICROALB/CREAT RATIO   2. Essential hypertension  I10 401.9 CBC WITH AUTOMATED DIFF      METABOLIC PANEL, COMPREHENSIVE      LIPID PANEL   3. Mixed hyperlipidemia  E78.2 272.2 ezetimibe (ZETIA) 10 mg tablet      METABOLIC PANEL, COMPREHENSIVE      LIPID PANEL     Uncontrolled type 2 diabetes mellitus with microalbuminuria (HCC)   - Cont glyburide to 10mg daily.  - Discont trulicity due to side effects. - start jardiance 25mg daily.   - Work on diet & exercise   - Repeat fasting labs prior to next visit  - annual diabetic eye exams  - he has opted not to do an ACE inhibitor  -Advised him that his noncompliance places him at great risk for complications.     HTN   -controlled off meds. Mixed hyperlipidemia  - Poorly controlled  - Cont Lipitor 80mg nightly  - add Zetia 10mg nightly. - recheck labs in 3 months. All chart history elements were reviewed by me at the time of the visit even though marked at time of note closure. Patient understands our medical plan. Patient has provided input and agrees with goals. Alternatives have been explained and offered. All questions answered. The patient is to call if condition worsens or fails to improve. Follow-up and Dispositions    · Return in about 3 months (around 11/10/2021) for routine care. Fasting labs due 3-7 days prior to appointment.

## 2021-08-10 NOTE — PROGRESS NOTES
1. Have you been to the ER, urgent care clinic since your last visit? Hospitalized since your last visit? No    2. Have you seen or consulted any other health care providers outside of the [address] [place] [address] since your last visit? Include any pap smears or colon screening.  No

## 2021-08-25 ENCOUNTER — TELEPHONE (OUTPATIENT)
Dept: FAMILY MEDICINE CLINIC | Age: 41
End: 2021-08-25

## 2021-08-25 NOTE — TELEPHONE ENCOUNTER
Fax received from [address] [place] [address] meds stating prior auth is required for the Trulicity 1.5 [place]/7.0LG pen-injectors  Submit a PA request  1. Go to key. [employer] and click \"Enter a Key\"  2. Patient last name: Khadar       : [birth year]      Key: [id number]  3.  Click \"start a PA\", complete the form, and \"send to plan\"

## 2021-08-27 NOTE — TELEPHONE ENCOUNTER
Spoke with Ivan to inform him that per his prior authorization (covermymeds) is plan states \"New(Not sent to plan)  Patient Inactive\". He was advise to make sure pharmacy has the correct insurance and or call his insurance company.

## 2021-08-30 NOTE — TELEPHONE ENCOUNTER
Trulicity was discontinued at patient 8/10/2021 appointment due to side effects. Spoke with pharmacist Shital Paris at [address] [place] [address] to discontinue the medication.

## 2021-11-09 ENCOUNTER — HOSPITAL ENCOUNTER (OUTPATIENT)
Dept: LAB | Age: 41
Discharge: HOME | End: 2021-11-09

## 2021-11-09 LAB — XX-LABCORP SPECIMEN COL,LCBCF: NORMAL

## 2021-11-09 PROCEDURE — 99001 SPECIMEN HANDLING PT-LAB: CPT

## 2021-11-11 ENCOUNTER — OFFICE VISIT (OUTPATIENT)
Dept: FAMILY MEDICINE CLINIC | Age: 41
End: 2021-11-11
Payer: MEDICAID

## 2021-11-11 ENCOUNTER — TELEPHONE (OUTPATIENT)
Dept: FAMILY MEDICINE CLINIC | Age: 41
End: 2021-11-11

## 2021-11-11 VITALS
OXYGEN SATURATION: 98 % | BODY MASS INDEX: 30.09 KG/M2 | DIASTOLIC BLOOD PRESSURE: 84 MMHG | TEMPERATURE: 97.7 F | WEIGHT: 210.2 LBS | SYSTOLIC BLOOD PRESSURE: 124 MMHG | HEIGHT: 70 IN | RESPIRATION RATE: 16 BRPM | HEART RATE: 88 BPM

## 2021-11-11 DIAGNOSIS — Z23 NEEDS FLU SHOT: ICD-10-CM

## 2021-11-11 DIAGNOSIS — E78.2 MIXED HYPERLIPIDEMIA: ICD-10-CM

## 2021-11-11 DIAGNOSIS — I10 ESSENTIAL HYPERTENSION: ICD-10-CM

## 2021-11-11 PROCEDURE — 90686 IIV4 VACC NO PRSV 0.5 ML IM: CPT | Performed by: FAMILY MEDICINE

## 2021-11-11 PROCEDURE — 3051F HG A1C>EQUAL 7.0%<8.0%: CPT | Performed by: FAMILY MEDICINE

## 2021-11-11 PROCEDURE — 99214 OFFICE O/P EST MOD 30 MIN: CPT | Performed by: FAMILY MEDICINE

## 2021-11-11 RX ORDER — ATORVASTATIN CALCIUM 80 MG/1
80 TABLET, FILM COATED ORAL
Qty: 30 TABLET | Refills: 3 | Status: SHIPPED | OUTPATIENT
Start: 2021-11-11 | End: 2022-03-15

## 2021-11-11 RX ORDER — GLYBURIDE 5 MG/1
10 TABLET ORAL
Qty: 60 TABLET | Refills: 2 | Status: CANCELLED | OUTPATIENT
Start: 2021-11-11

## 2021-11-11 RX ORDER — EZETIMIBE 10 MG/1
10 TABLET ORAL
Qty: 30 TABLET | Refills: 2 | Status: SHIPPED | OUTPATIENT
Start: 2021-11-11 | End: 2022-03-15

## 2021-11-11 NOTE — PROGRESS NOTES
Flulaval 0.5 ml given IM in left deltoid. Lot # [id number], exp date 06/30/2022. Patient tolerated injection well. No adverse reaction noted.

## 2021-11-11 NOTE — PATIENT INSTRUCTIONS
Vaccine Information Statement    Influenza (Flu) Vaccine (Inactivated or Recombinant): What You Need to Know    Many vaccine information statements are available in German and other languages. See www.immunize.org/vis. Hojas de información sobre vacunas están disponibles en español y en muchos otros idiomas. Visite www.immunize.org/vis. 1. Why get vaccinated? Influenza vaccine can prevent influenza (flu). Flu is a contagious disease that spreads around the [place] every year, usually between October and May. Anyone can get the flu, but it is more dangerous for some people. Infants and young children, people 72 years and older, pregnant people, and people with certain health conditions or a weakened immune system are at greatest risk of flu complications. Pneumonia, bronchitis, sinus infections, and ear infections are examples of flu-related complications. If you have a medical condition, such as heart disease, cancer, or diabetes, flu can make it worse. Flu can cause fever and chills, sore throat, muscle aches, fatigue, cough, headache, and runny or stuffy nose. Some people may have vomiting and diarrhea, though this is more common in children than adults. In an average year, thousands of people in the [facility] die from flu, and many more are hospitalized. Flu vaccine prevents millions of illnesses and flu-related visits to the doctor each year. 2. Influenza vaccines     CDC recommends everyone 6 months and older get vaccinated every flu season. Children 6 months through 6years of age may need 2 doses during a single flu season. Everyone else needs only 1 dose each flu season. It takes about 2 weeks for protection to develop after vaccination. There are many flu viruses, and they are always changing. Each year a new flu vaccine is made to protect against the influenza viruses believed to be likely to cause disease in the upcoming flu season.  Even when the vaccine doesnt exactly match these viruses, it may still provide some protection. Influenza vaccine does not cause flu. Influenza vaccine may be given at the same time as other vaccines. 3. Talk with your health care provider    Tell your vaccination provider if the person getting the vaccine:   Has had an allergic reaction after a previous dose of influenza vaccine, or has any severe, life-threatening allergies    Has ever had Guillain-Barré Syndrome (also called GBS)    In some cases, your health care provider may decide to postpone influenza vaccination until a future visit. Influenza vaccine can be administered at any time during pregnancy. People who are or will be pregnant during influenza season should receive inactivated influenza vaccine. People with minor illnesses, such as a cold, may be vaccinated. People who are moderately or severely ill should usually wait until they recover before getting influenza vaccine. Your health care provider can give you more information. 4. Risks of a vaccine reaction     Soreness, redness, and swelling where the shot is given, fever, muscle aches, and headache can happen after influenza vaccination.  There may be a very small increased risk of Guillain-Barré Syndrome (GBS) after inactivated influenza vaccine (the flu shot). [facility] children who get the flu shot along with pneumococcal vaccine (PCV13) and/or DTaP vaccine at the same time might be slightly more likely to have a seizure caused by fever. Tell your health care provider if a child who is getting flu vaccine has ever had a seizure. People sometimes faint after medical procedures, including vaccination. Tell your provider if you feel dizzy or have vision changes or ringing in the ears. As with any medicine, there is a very remote chance of a vaccine causing a severe allergic reaction, other serious injury, or death. 5. What if there is a serious problem?     An allergic reaction could occur after the vaccinated person leaves the clinic. If you see signs of a severe allergic reaction (hives, swelling of the face and throat, difficulty breathing, a fast heartbeat, dizziness, or weakness), call 9-1-1 and get the person to the nearest hospital.    For other signs that concern you, call your health care provider. Adverse reactions should be reported to the Vaccine Adverse Event Reporting System (VAERS). Your health care provider will usually file this report, or you can do it yourself. Visit the VAERS website at www.vaers. [facility].gov or call 0-339.204.7912. VAERS is only for reporting reactions, and VAERS staff members do not give medical advice. 6. The National Vaccine Injury Compensation Program    The [facility] Vaccine Injury Compensation Program (VICP) is a federal program that was created to compensate people who may have been injured by certain vaccines. Claims regarding alleged injury or death due to vaccination have a time limit for filing, which may be as short as two years. Visit the VICP website at [facility].gov/vaccinecompensation or call 8-277.414.4260 to learn about the program and about filing a claim. 7. How can I learn more?  Ask your health care provider.  Call your local or state health department.  Visit the website of the Food and Drug Administration (FDA) for vaccine package inserts and additional information at www.fda.gov/vaccines-blood-biologics/vaccines.  Contact the Centers for Disease Control and Prevention (CDC):  - Call 8-933.175.1433 (1-800-CDC-INFO) or  - Visit CDCs influenza website at www.cdc.gov/flu. Vaccine Information Statement   Inactivated Influenza Vaccine   8/6/2021  [address] [id number]-14   Department of Health and Human Services  Centers for Disease Control and Prevention    Office Use Only

## 2021-11-11 NOTE — PROGRESS NOTES
Chief Complaint   Patient presents with    Diabetes    Cholesterol Problem    Results     SUBJECTIVE    Patient presents for follow-up of diabetes. Patient unable to med rec. Says he is only taking 2 pill bottles right now. He shows me jardiance and zetia from pics his wife sent me. Initially she sent clindamycin instead of jardiance. Diabetes -  Diabetes has stayed sideways  He is not taking glyburide. He is sometimes taking jardiance. Home glucose readings:  not consistently doing. Patient denies symptomatic hypo- or hyperglycemia since last visit. Denies headaches, lightheadedness, dizziness, chest pain, SOB, heart palpitations, nausea, vomiting, change in BMs, urinary frequency/urgency, skin changes/wounds, sensory disturbances     Says his father had several diabetic complications to include dialysis and amputations. Hyperlipidemia -   Recent LDL noted to be elevated. Current labs pending but he is not on his statin. No side effects. OBJECTIVE  Blood pressure 124/84, pulse 88, temperature 97.7 °F (36.5 °C), temperature source Temporal, resp. rate 16, height 5' 10\" (1.778 m), weight 210 lb 3.2 oz (95.3 kg), SpO2 98 %. General:  Alert, cooperative, well appearing, in no apparent distress. Chest: Clear, no w/r/r. Heart: normal S1S2, RRR  Ext / feet:  No swelling. No deformity. Monofilament testing is intact. No pedal lesions. Vascularly intact. Psych: normal affect. Mood good. Oriented x 3. Judgement and insight intact. ASSESSMENT / PLAN    ICD-10-CM ICD-9-CM    1. Uncontrolled type 2 diabetes mellitus with microalbuminuria (HCC)  E11.29 250.42 empagliflozin (Jardiance) 25 mg tablet    E11.65 583.81 HEMOGLOBIN A1C W/O EAG    R80.9  LIPID PANEL      METABOLIC PANEL, COMPREHENSIVE   2. Mixed hyperlipidemia  E78.2 272.2 ezetimibe (ZETIA) 10 mg tablet      atorvastatin (LIPITOR) 80 mg tablet      LIPID PANEL   3. Essential hypertension  I10 401.9    4.  Needs flu shot  Z23 V04.81 INFLUENZA VIRUS VAC QUAD,SPLIT,PRESV FREE SYRINGE IM     Uncontrolled type 2 diabetes mellitus with microalbuminuria ([facility] [address]   - Labs scanned to the chart.  - advised on compliance with jardiance 25mg daily. - Work on diet & exercise   - Repeat fasting labs prior to next visit  - annual diabetic eye exams  - he has opted not to do an ACE inhibitor  -Advised him that his noncompliance places him at great risk for complications.     Mixed hyperlipidemia  - Poorly controlled  - start back on Lipitor 80mg nightly  - cont Zetia 10mg nightly. - recheck labs in 3 months. HTN   -controlled off meds. Flu vaccine administered. All chart history elements were reviewed by me at the time of the visit even though marked at time of note closure. Patient understands our medical plan. Patient has provided input and agrees with goals. Alternatives have been explained and offered. All questions answered. The patient is to call if condition worsens or fails to improve. Follow-up and Dispositions    · Return in about 3 months (around 2/11/2022), or f/u in 3 months with fasting labs one week prior to your appointment.

## 2021-11-11 NOTE — TELEPHONE ENCOUNTER
Fax received from [address] [place] [address] meds stating prior Martha Aaron is required for the Jardiance 25MG Tablets . Submit a PA request  1. Go to key. [email or web address] and click \"Enter a Key\"  2. Patient last name: Khadar      : 1980      Key: [id number]  3.  Click \"start a PA\", complete the form, and \"send to plan\"

## 2021-11-11 NOTE — PROGRESS NOTES
1. \"Have you been to the ER, urgent care clinic since your last visit? Hospitalized since your last visit? \" No    2. \"Have you seen or consulted any other health care providers outside of the [address] [place] [address] since your last visit? \" No    3. For patients aged 39-70: Has the patient had a colonoscopy?  No    Chief Complaint   Patient presents with    Diabetes    Cholesterol Problem    Results

## 2021-11-13 LAB
ALBUMIN/CREAT UR: 14 MG/G CREAT (ref 0–29)
BASOPHILS # BLD AUTO: 0 X10E3/UL (ref 0–0.2)
BASOPHILS NFR BLD AUTO: 1 %
CREAT UR-MCNC: 307.3 MG/DL
EOSINOPHIL # BLD AUTO: 0.2 X10E3/UL (ref 0–0.4)
EOSINOPHIL NFR BLD AUTO: 3 %
ERYTHROCYTE [DISTWIDTH] IN BLOOD BY AUTOMATED COUNT: 13.4 % (ref 11.6–15.4)
HBA1C MFR BLD: 7.5 % (ref 4.8–5.6)
HCT VFR BLD AUTO: 48.7 % (ref 37.5–51)
HGB BLD-MCNC: 16 G/DL (ref 13–17.7)
IMM GRANULOCYTES # BLD AUTO: 0 X10E3/UL (ref 0–0.1)
IMM GRANULOCYTES NFR BLD AUTO: 0 %
IMP & REVIEW OF LAB RESULTS: NORMAL
LYMPHOCYTES # BLD AUTO: 2.7 X10E3/UL (ref 0.7–3.1)
LYMPHOCYTES NFR BLD AUTO: 34 %
MCH RBC QN AUTO: 28.4 PG (ref 26.6–33)
MCHC RBC AUTO-ENTMCNC: 32.9 G/DL (ref 31.5–35.7)
MCV RBC AUTO: 86 FL (ref 79–97)
MICROALBUMIN UR-MCNC: 42.1 UG/ML
MONOCYTES # BLD AUTO: 0.6 X10E3/UL (ref 0.1–0.9)
MONOCYTES NFR BLD AUTO: 8 %
NEUTROPHILS # BLD AUTO: 4.6 X10E3/UL (ref 1.4–7)
NEUTROPHILS NFR BLD AUTO: 54 %
PLATELET # BLD AUTO: 256 X10E3/UL (ref 150–450)
RBC # BLD AUTO: 5.64 X10E6/UL (ref 4.14–5.8)
WBC # BLD AUTO: 8.2 X10E3/UL (ref 3.4–10.8)

## 2022-02-12 ENCOUNTER — HOSPITAL ENCOUNTER (OUTPATIENT)
Dept: LAB | Age: 42
Discharge: HOME | End: 2022-02-12

## 2022-02-12 LAB — XX-LABCORP SPECIMEN COL,LCBCF: NORMAL

## 2022-02-12 PROCEDURE — 99001 SPECIMEN HANDLING PT-LAB: CPT

## 2022-02-14 LAB
ALBUMIN SERPL-MCNC: 4.8 G/DL (ref 4–5)
ALBUMIN/GLOB SERPL: 1.8 {RATIO} (ref 1.2–2.2)
ALP SERPL-CCNC: 76 IU/L (ref 44–121)
ALT SERPL-CCNC: 40 IU/L (ref 0–44)
AST SERPL-CCNC: 26 IU/L (ref 0–40)
BILIRUB SERPL-MCNC: 0.4 MG/DL (ref 0–1.2)
BUN SERPL-MCNC: 16 MG/DL (ref 6–24)
BUN/CREAT SERPL: 14 (ref 9–20)
CALCIUM SERPL-MCNC: 9.7 MG/DL (ref 8.7–10.2)
CHLORIDE SERPL-SCNC: 103 MMOL/L (ref 96–106)
CHOLEST SERPL-MCNC: 290 MG/DL (ref 100–199)
CO2 SERPL-SCNC: 20 MMOL/L (ref 20–29)
CREAT SERPL-MCNC: 1.17 MG/DL (ref 0.76–1.27)
GLOBULIN SER CALC-MCNC: 2.7 G/DL (ref 1.5–4.5)
GLUCOSE SERPL-MCNC: 109 MG/DL (ref 65–99)
HBA1C MFR BLD: 7.1 % (ref 4.8–5.6)
HDLC SERPL-MCNC: 44 MG/DL
IMP & REVIEW OF LAB RESULTS: NORMAL
LDLC SERPL CALC-MCNC: 222 MG/DL (ref 0–99)
POTASSIUM SERPL-SCNC: 4.4 MMOL/L (ref 3.5–5.2)
PROT SERPL-MCNC: 7.5 G/DL (ref 6–8.5)
SODIUM SERPL-SCNC: 140 MMOL/L (ref 134–144)
SPECIMEN STATUS REPORT, ROLRST: NORMAL
TRIGL SERPL-MCNC: 131 MG/DL (ref 0–149)
VLDLC SERPL CALC-MCNC: 24 MG/DL (ref 5–40)

## 2022-02-15 ENCOUNTER — OFFICE VISIT (OUTPATIENT)
Dept: FAMILY MEDICINE CLINIC | Age: 42
End: 2022-02-15
Payer: MEDICAID

## 2022-02-15 VITALS
DIASTOLIC BLOOD PRESSURE: 88 MMHG | WEIGHT: 206 LBS | SYSTOLIC BLOOD PRESSURE: 138 MMHG | HEART RATE: 89 BPM | RESPIRATION RATE: 16 BRPM | TEMPERATURE: 98.8 F | OXYGEN SATURATION: 99 % | HEIGHT: 70 IN | BODY MASS INDEX: 29.49 KG/M2

## 2022-02-15 DIAGNOSIS — Z91.199 MEDICALLY NONCOMPLIANT: ICD-10-CM

## 2022-02-15 DIAGNOSIS — I10 ESSENTIAL HYPERTENSION: ICD-10-CM

## 2022-02-15 DIAGNOSIS — E78.2 MIXED HYPERLIPIDEMIA: ICD-10-CM

## 2022-02-15 DIAGNOSIS — T88.7XXA MEDICATION SIDE EFFECTS: ICD-10-CM

## 2022-02-15 PROCEDURE — 3051F HG A1C>EQUAL 7.0%<8.0%: CPT | Performed by: FAMILY MEDICINE

## 2022-02-15 PROCEDURE — 99214 OFFICE O/P EST MOD 30 MIN: CPT | Performed by: FAMILY MEDICINE

## 2022-02-15 RX ORDER — GLYBURIDE 5 MG/1
10 TABLET ORAL
Qty: 60 TABLET | Refills: 0 | Status: SHIPPED | OUTPATIENT
Start: 2022-02-15 | End: 2022-03-15 | Stop reason: SDUPTHER

## 2022-02-15 NOTE — PROGRESS NOTES
1. \"Have you been to the ER, urgent care clinic since your last visit? Hospitalized since your last visit? \" Yes, [facility] 1 month ago     2. \"Have you seen or consulted any other health care providers outside of the [address] [place] [address] since your last visit? \" No     3. For patients aged 39-70: Has the patient had a colonoscopy / FIT/ Cologuard? NA - based on age      If the patient is female:    4. For patients aged 41-77: Has the patient had a mammogram within the past 2 years? NA - based on age or sex      11. For patients aged 21-65: Has the patient had a pap smear?  NA - based on age or sex

## 2022-02-15 NOTE — PROGRESS NOTES
Chief Complaint   Patient presents with    Diabetes    Hypertension    Results    Cholesterol Problem     SUBJECTIVE    Patient presents for follow-up of diabetes. Patient brings 3 of his 4 medications. He has run out of glyburide more than one month ago. Today he presents with his mother and sister who are attending via video. Diabetes -  Diabetes has improved some. He is sometimes taking jardiance, now reporting diarrhea from \"all of my meds. \"   Says he has to miss work if he takes his meds so he skips them on days he works. He says side effects go away when he does. Interestingly, he reports he has had diarrhea even before he started all of these meds when I pin him down on a timeline but he says he is sure it is the meds. Had side effects with glucophage and trulicity   Home glucose readings:  not consistently doing. Patient denies symptomatic hypo- or hyperglycemia since last visit. Denies headaches, lightheadedness, dizziness, chest pain, SOB, heart palpitations, vomiting urinary frequency/urgency, skin changes/wounds, sensory disturbances     Says his father had several diabetic complications to include dialysis and amputations. Hyperlipidemia -   Recent LDL noted to be elevated. He is not on his statin or Zetia consistently. No side effects reported in the past but now he does report diarrhea. OBJECTIVE  Blood pressure (!) 132/98, pulse 89, temperature 98.8 °F (37.1 °C), temperature source Temporal, resp. rate 16, height 5' 10\" (1.778 m), weight 206 lb (93.4 kg), SpO2 99 %. General:  Alert, cooperative, well appearing, in no apparent distress. Chest: Clear, no w/r/r. Heart: normal S1S2, RRR  Ext / feet:  No swelling. Psych: normal affect. Mood good. Oriented x 3. Judgement and insight intact. Results for Meli Elmore (MRN [id number]) as of 2/15/2022 09:23   Ref.  Range 2/12/2022 00:00   Sodium Latest Ref Range: 134 - 144 mmol/L 140   Potassium Latest Ref Range: 3.5 - 5.2 mmol/L 4.4   Chloride Latest Ref Range: 96 - 106 mmol/L 103   CO2 Latest Ref Range: 20 - 29 mmol/L 20   Glucose Latest Ref Range: 65 - 99 mg/dL 109 (H)   BUN Latest Ref Range: 6 - 24 mg/dL 16   Creatinine Latest Ref Range: 0.76 - 1.27 mg/dL 1.17   BUN/Creatinine ratio Latest Ref Range: 9 - 20  14   Calcium Latest Ref Range: 8.7 - 10.2 mg/dL 9.7   GFR est non-AA Latest Ref Range: >59 mL/min/1.73 76   GFR est AA Latest Ref Range: >59 mL/min/1.73 88   Bilirubin, total Latest Ref Range: 0.0 - 1.2 mg/dL 0.4   Protein, total Latest Ref Range: 6.0 - 8.5 g/dL 7.5   Albumin Latest Ref Range: 4.0 - 5.0 g/dL 4.8   A-G Ratio Latest Ref Range: 1.2 - 2.2  1.8   ALT Latest Ref Range: 0 - 44 IU/L 40   AST Latest Ref Range: 0 - 40 IU/L 26   Alk. phosphatase Latest Ref Range: 44 - 121 IU/L 76   Triglyceride Latest Ref Range: 0 - 149 mg/dL 131   Cholesterol, total Latest Ref Range: 100 - 199 mg/dL 290 (H)   HDL Cholesterol Latest Ref Range: >39 mg/dL 44   VLDL, calculated Latest Ref Range: 5 - 40 mg/dL 24   LDL, calculated Latest Ref Range: 0 - 99 mg/dL 222 (H)   Hemoglobin A1c, (calculated) Latest Ref Range: 4.8 - 5.6 % 7.1 (H)       ASSESSMENT / PLAN    ICD-10-CM ICD-9-CM    1. Uncontrolled type 2 diabetes mellitus with microalbuminuria (HCC)  E11.29 250.42 glyBURIDE (DIABETA) 5 mg tablet    E11.65 583.81     R80.9     2. Mixed hyperlipidemia  E78.2 272.2    3. Essential hypertension  I10 401.9    4. Medication side effects  T88. 7XXA 995.20    5. Medically noncompliant  Z91.19 V15.81      Uncontrolled type 2 diabetes mellitus with microalbuminuria ([facility] [address]   - Labs reviewed. - he will hold jardiance for now. He will start glyburide since he has not been taking it and still has side effects. So we can see how he responds to that alone.   He will take this for one month and then we can take his A1c in 1 month in the office.  - Work on diet & exercise   - annual diabetic eye exams  - he has opted not to do an ACE inhibitor but now in light of two elevated BPs, we should consider more so. We will not start two meds at same time due to his issues with side effects and not being able to tell which med is doing it. Mixed hyperlipidemia  - Poorly controlled  - hold meds for now and we will restart one at a time. HTN   -we will start an ACE inhibitor or ARB eventually. Medication side effects  - we will add one med each month so we can figure this out, otherwise we have no other options since he has exhausted several classes of meds. Medical noncompliance   -Advised him that his noncompliance places him at great risk for complications.     All chart history elements were reviewed by me at the time of the visit even though marked at time of note closure. Patient understands our medical plan. Patient has provided input and agrees with goals. Alternatives have been explained and offered. All questions answered. The patient is to call if condition worsens or fails to improve. Follow-up and Dispositions    · Return in about 1 month (around 3/15/2022) for diabetes. A1c to be done in office.

## 2022-02-15 NOTE — PATIENT INSTRUCTIONS
A Healthy Lifestyle: Care Instructions  Your Care Instructions     A healthy lifestyle can help you feel good, stay at a healthy weight, and have plenty of energy for both work and play. A healthy lifestyle is something you can share with your whole family. A healthy lifestyle also can lower your risk for serious health problems, such as high blood pressure, heart disease, and diabetes. You can follow a few steps listed below to improve your health and the health of your family. Follow-up care is a key part of your treatment and safety. Be sure to make and go to all appointments, and call your doctor if you are having problems. It's also a good idea to know your test results and keep a list of the medicines you take. How can you care for yourself at home? · Do not eat too much sugar, fat, or fast foods. You can still have dessert and treats now and then. The goal is moderation. · Start small to improve your eating habits. Pay attention to portion sizes, drink less juice and soda pop, and eat more fruits and vegetables. ? Eat a healthy amount of food. A 3-ounce serving of meat, for example, is about the size of a deck of cards. Fill the rest of your plate with vegetables and whole grains. ? Limit the amount of soda and sports drinks you have every day. Drink more water when you are thirsty. ? Eat plenty of fruits and vegetables every day. Have an apple or some carrot sticks as an afternoon snack instead of a candy bar. Try to have fruits and/or vegetables at every meal.  · Make exercise part of your daily routine. You may want to start with simple activities, such as walking, bicycling, or slow swimming. Try to be active 30 to 60 minutes every day. You do not need to do all 30 to 60 minutes all at once. For example, you can exercise 3 times a day for 10 or 20 minutes.  Moderate exercise is safe for most people, but it is always a good idea to talk to your doctor before starting an exercise program.  · Keep moving. Rajat Bidding the lawn, work in the garden, or [employer]. Take the stairs instead of the elevator at work. · If you smoke, quit. People who smoke have an increased risk for heart attack, stroke, cancer, and other lung illnesses. Quitting is hard, but there are ways to boost your chance of quitting tobacco for good. ? Use nicotine gum, patches, or lozenges. ? Ask your doctor about stop-smoking programs and medicines. ? Keep trying. In addition to reducing your risk of diseases in the future, you will notice some benefits soon after you stop using tobacco. If you have shortness of breath or asthma symptoms, they will likely get better within a few weeks after you quit. · Limit how much alcohol you drink. Moderate amounts of alcohol (up to 2 drinks a day for men, 1 drink a day for women) are okay. But drinking too much can lead to liver problems, high blood pressure, and other health problems. Family health  If you have a family, there are many things you can do together to improve your health. · Eat meals together as a family as often as possible. · Eat healthy foods. This includes fruits, vegetables, lean meats and dairy, and whole grains. · Include your family in your fitness plan. Most people think of activities such as jogging or tennis as the way to fitness, but there are many ways you and your family can be more active. Anything that makes you breathe hard and gets your heart pumping is exercise. Here are some tips:  ? Walk to do errands or to take your child to school or the bus.  ? Go for a family bike ride after dinner instead of watching TV. Where can you learn more? Go to http://www.gray.com/  Enter K074 in the search box to learn more about \"A Healthy Lifestyle: Care Instructions. \"  Current as of: June 16, 2021               Content Version: 13.0  © 8588-3991 [employer]wise, Incorporated.    Care instructions adapted under license by [employer] (which disclaims liability or warranty for this information). If you have questions about a medical condition or this instruction, always ask your healthcare professional. [address] any warranty or liability for your use of this information.

## 2022-03-15 ENCOUNTER — OFFICE VISIT (OUTPATIENT)
Dept: FAMILY MEDICINE CLINIC | Age: 42
End: 2022-03-15
Payer: MEDICAID

## 2022-03-15 VITALS
HEIGHT: 70 IN | WEIGHT: 205 LBS | TEMPERATURE: 98 F | OXYGEN SATURATION: 97 % | HEART RATE: 80 BPM | DIASTOLIC BLOOD PRESSURE: 70 MMHG | BODY MASS INDEX: 29.35 KG/M2 | RESPIRATION RATE: 16 BRPM | SYSTOLIC BLOOD PRESSURE: 110 MMHG

## 2022-03-15 DIAGNOSIS — E78.2 MIXED HYPERLIPIDEMIA: ICD-10-CM

## 2022-03-15 DIAGNOSIS — I10 ESSENTIAL HYPERTENSION: ICD-10-CM

## 2022-03-15 DIAGNOSIS — T88.7XXA MEDICATION SIDE EFFECTS: ICD-10-CM

## 2022-03-15 LAB — HBA1C MFR BLD HPLC: 7 %

## 2022-03-15 PROCEDURE — 3051F HG A1C>EQUAL 7.0%<8.0%: CPT | Performed by: FAMILY MEDICINE

## 2022-03-15 PROCEDURE — 99214 OFFICE O/P EST MOD 30 MIN: CPT | Performed by: FAMILY MEDICINE

## 2022-03-15 PROCEDURE — 83036 HEMOGLOBIN GLYCOSYLATED A1C: CPT | Performed by: FAMILY MEDICINE

## 2022-03-15 RX ORDER — GLYBURIDE 5 MG/1
10 TABLET ORAL
Qty: 60 TABLET | Refills: 11 | Status: SHIPPED | OUTPATIENT
Start: 2022-03-15 | End: 2022-04-14 | Stop reason: SDUPTHER

## 2022-03-15 NOTE — PROGRESS NOTES
1. \"Have you been to the ER, urgent care clinic since your last visit? Hospitalized since your last visit? \" No    2. \"Have you seen or consulted any other health care providers outside of the [address] [place] [address] since your last visit? \" No     3. For patients aged 39-70: Has the patient had a colonoscopy / FIT/ Cologuard? NA - based on age      If the patient is female:    4. For patients aged 41-77: Has the patient had a mammogram within the past 2 years? NA - based on age or sex      11. For patients aged 21-65: Has the patient had a pap smear?  NA - based on age or sex

## 2022-03-15 NOTE — PROGRESS NOTES
Chief Complaint   Patient presents with    Diabetes     SUBJECTIVE    Patient presents for follow-up of diabetes. Due to patient stating he had side effects to all of his meds (mainly diarrhea), we stopped all but his glyburide. He is taking that without side effects. He presents today for an A1c check. He has had blood sugars in the 120-140 range typically with a few isolated higher levels. He asks about a CGM. He has absolutely no diarrhea at this time. Diabetes -  He was in the past sometimes taking jardiance in the past. He had side effects with glucophage and trulicity - diarrhea. He is off all of those and only on glyburide. Home glucose readings:  checking intermittently. Patient denies symptomatic hypo- or hyperglycemia since last visit. Denies headaches, lightheadedness, dizziness, chest pain, SOB, heart palpitations, vomiting urinary frequency/urgency, skin changes/wounds, sensory disturbances     Says his father had several diabetic complications to include dialysis and amputations. Hyperlipidemia -   Recent LDL noted to be elevated. He is not on his statin or Zetia at this time due to careplan above. No side effects reported in the past but he was reporting diarrhea in the past.    OBJECTIVE  Blood pressure 110/70, pulse 80, temperature 98 °F (36.7 °C), temperature source Temporal, resp. rate 16, height 5' 10\" (1.778 m), weight 205 lb (93 kg), SpO2 97 %. General:  Alert, cooperative, well appearing, in no apparent distress. Results for orders placed or performed in visit on 03/15/22   AMB POC HEMOGLOBIN A1C   Result Value Ref Range    Hemoglobin A1c (POC) 7.0 %       ASSESSMENT / PLAN    ICD-10-CM ICD-9-CM    1. Uncontrolled type 2 diabetes mellitus with microalbuminuria (HCC)  E11.29 250.42 AMB POC HEMOGLOBIN A1C    E11.65 583.81 glyBURIDE (DIABETA) 5 mg tablet    R80.9  empagliflozin (Jardiance) 25 mg tablet   2. Mixed hyperlipidemia  E78.2 272.2    3.  Essential hypertension  I10 401.9    4. Medication side effects  T88. 7XXA 995.20      Uncontrolled type 2 diabetes mellitus with microalbuminuria ([facility] [address]   - uncontrolled due to A1c at 7% or above. - cont glyburide. Start [employer] now at prior dose. - Work on diet & exercise   - Check A1c in 1 month. - he has opted not to do an ACE inhibitor. Mixed hyperlipidemia  - Poorly controlled  - hold meds for now and we will restart one at a time. - we may be able to start back on a statin next OV. HTN   -today he is normotensive on recheck. Medication side effects  - we will add one med each month so we can properly vet side effects. All chart history elements were reviewed by me at the time of the visit even though marked at time of note closure. Patient understands our medical plan. Patient has provided input and agrees with goals. Alternatives have been explained and offered. All questions answered. The patient is to call if condition worsens or fails to improve. Follow-up and Dispositions    · Return in 1 month (on 4/15/2022) for diabetes. A1c to be done in office.

## 2022-03-18 PROBLEM — G47.33 OBSTRUCTIVE SLEEP APNEA: Status: ACTIVE | Noted: 2018-12-05

## 2022-03-18 PROBLEM — I10 ESSENTIAL HYPERTENSION: Status: ACTIVE | Noted: 2018-07-23

## 2022-03-19 PROBLEM — E11.9 TYPE 2 DIABETES MELLITUS WITHOUT COMPLICATION, WITHOUT LONG-TERM CURRENT USE OF INSULIN (HCC): Status: ACTIVE | Noted: 2018-06-14

## 2022-03-19 PROBLEM — E78.2 MIXED HYPERLIPIDEMIA: Status: ACTIVE | Noted: 2018-06-14

## 2022-03-19 PROBLEM — E11.21 TYPE 2 DIABETES WITH NEPHROPATHY (HCC): Status: ACTIVE | Noted: 2019-09-11

## 2022-04-14 ENCOUNTER — OFFICE VISIT (OUTPATIENT)
Dept: FAMILY MEDICINE CLINIC | Age: 42
End: 2022-04-14
Payer: MEDICAID

## 2022-04-14 VITALS
WEIGHT: 207 LBS | SYSTOLIC BLOOD PRESSURE: 120 MMHG | HEIGHT: 70 IN | TEMPERATURE: 98.3 F | BODY MASS INDEX: 29.63 KG/M2 | HEART RATE: 82 BPM | OXYGEN SATURATION: 97 % | DIASTOLIC BLOOD PRESSURE: 88 MMHG | RESPIRATION RATE: 16 BRPM

## 2022-04-14 DIAGNOSIS — E11.9 TYPE 2 DIABETES MELLITUS WITHOUT COMPLICATION, WITHOUT LONG-TERM CURRENT USE OF INSULIN (HCC): Primary | ICD-10-CM

## 2022-04-14 DIAGNOSIS — Z91.199 MEDICALLY NONCOMPLIANT: ICD-10-CM

## 2022-04-14 DIAGNOSIS — T88.7XXA MEDICATION SIDE EFFECTS: ICD-10-CM

## 2022-04-14 DIAGNOSIS — I10 ESSENTIAL HYPERTENSION: ICD-10-CM

## 2022-04-14 DIAGNOSIS — E78.2 MIXED HYPERLIPIDEMIA: ICD-10-CM

## 2022-04-14 LAB — HBA1C MFR BLD HPLC: 7.3 %

## 2022-04-14 PROCEDURE — 3051F HG A1C>EQUAL 7.0%<8.0%: CPT | Performed by: FAMILY MEDICINE

## 2022-04-14 PROCEDURE — 83036 HEMOGLOBIN GLYCOSYLATED A1C: CPT | Performed by: FAMILY MEDICINE

## 2022-04-14 PROCEDURE — 99214 OFFICE O/P EST MOD 30 MIN: CPT | Performed by: FAMILY MEDICINE

## 2022-04-14 RX ORDER — ATORVASTATIN CALCIUM 80 MG/1
80 TABLET, FILM COATED ORAL
Qty: 30 TABLET | Refills: 1 | Status: SHIPPED | OUTPATIENT
Start: 2022-04-14 | End: 2022-06-09 | Stop reason: SDUPTHER

## 2022-04-14 RX ORDER — GLYBURIDE 5 MG/1
10 TABLET ORAL
Qty: 60 TABLET | Refills: 11 | Status: SHIPPED | OUTPATIENT
Start: 2022-04-14 | End: 2022-06-09 | Stop reason: SDUPTHER

## 2022-04-14 NOTE — PATIENT INSTRUCTIONS
A Healthy Lifestyle: Care Instructions  Your Care Instructions     A healthy lifestyle can help you feel good, stay at a healthy weight, and have plenty of energy for both work and play. A healthy lifestyle is something you can share with your whole family. A healthy lifestyle also can lower your risk for serious health problems, such as high blood pressure, heart disease, and diabetes. You can follow a few steps listed below to improve your health and the health of your family. Follow-up care is a key part of your treatment and safety. Be sure to make and go to all appointments, and call your doctor if you are having problems. It's also a good idea to know your test results and keep a list of the medicines you take. How can you care for yourself at home? · Do not eat too much sugar, fat, or fast foods. You can still have dessert and treats now and then. The goal is moderation. · Start small to improve your eating habits. Pay attention to portion sizes, drink less juice and soda pop, and eat more fruits and vegetables. ? Eat a healthy amount of food. A 3-ounce serving of meat, for example, is about the size of a deck of cards. Fill the rest of your plate with vegetables and whole grains. ? Limit the amount of soda and sports drinks you have every day. Drink more water when you are thirsty. ? Eat plenty of fruits and vegetables every day. Have an apple or some carrot sticks as an afternoon snack instead of a candy bar. Try to have fruits and/or vegetables at every meal.  · Make exercise part of your daily routine. You may want to start with simple activities, such as walking, bicycling, or slow swimming. Try to be active 30 to 60 minutes every day. You do not need to do all 30 to 60 minutes all at once. For example, you can exercise 3 times a day for 10 or 20 minutes.  Moderate exercise is safe for most people, but it is always a good idea to talk to your doctor before starting an exercise program.  · Keep moving. Philip Beckettk the lawn, work in the garden, or [employer]. Take the stairs instead of the elevator at work. · If you smoke, quit. People who smoke have an increased risk for heart attack, stroke, cancer, and other lung illnesses. Quitting is hard, but there are ways to boost your chance of quitting tobacco for good. ? Use nicotine gum, patches, or lozenges. ? Ask your doctor about stop-smoking programs and medicines. ? Keep trying. In addition to reducing your risk of diseases in the future, you will notice some benefits soon after you stop using tobacco. If you have shortness of breath or asthma symptoms, they will likely get better within a few weeks after you quit. · Limit how much alcohol you drink. Moderate amounts of alcohol (up to 2 drinks a day for men, 1 drink a day for women) are okay. But drinking too much can lead to liver problems, high blood pressure, and other health problems. Family health  If you have a family, there are many things you can do together to improve your health. · Eat meals together as a family as often as possible. · Eat healthy foods. This includes fruits, vegetables, lean meats and dairy, and whole grains. · Include your family in your fitness plan. Most people think of activities such as jogging or tennis as the way to fitness, but there are many ways you and your family can be more active. Anything that makes you breathe hard and gets your heart pumping is exercise. Here are some tips:  ? Walk to do errands or to take your child to school or the bus.  ? Go for a family bike ride after dinner instead of watching TV. Where can you learn more? Go to http://www.gray.com/  Enter G603 in the search box to learn more about \"A Healthy Lifestyle: Care Instructions. \"  Current as of: June 16, 2021               Content Version: 13.2  © 0505-5687 Healthwise, Incorporated.    Care instructions adapted under license by [employer] (which disclaims liability or warranty for this information). If you have questions about a medical condition or this instruction, always ask your healthcare professional. [address] any warranty or liability for your use of this information.

## 2022-04-14 NOTE — PROGRESS NOTES
1. \"Have you been to the ER, urgent care clinic since your last visit? Hospitalized since your last visit? \" No    2. \"Have you seen or consulted any other health care providers outside of the [address] [place] [address] since your last visit? \" No     3. For patients aged 39-70: Has the patient had a colonoscopy / FIT/ Cologuard? NA - based on age      If the patient is female:    4. For patients aged 41-77: Has the patient had a mammogram within the past 2 years? NA - based on age or sex      11. For patients aged 21-65: Has the patient had a pap smear?  NA - based on age or sex

## 2022-04-14 NOTE — PROGRESS NOTES
Chief Complaint   Patient presents with    Diabetes     SUBJECTIVE    Patient presents for follow-up of diabetes. Due to patient stating he had side effects to all of his meds (mainly diarrhea), we stopped all but his glyburide and then we started Jardiance. He is taking that without side effects. Diabetes -  He is taking his meds on average 5 days per week he estimates. He brings an old pill bottle that would lead me to believe he is taking it half of the time based on the date on the bottle and how many are left. Home glucose readings:  checking intermittently. Patient denies symptomatic hypo- or hyperglycemia since last visit. Denies headaches, lightheadedness, dizziness, chest pain, SOB, heart palpitations, vomiting urinary frequency/urgency, skin changes/wounds, sensory disturbances     Says his father had several diabetic complications to include dialysis and amputations. Hyperlipidemia -   Recent LDL noted to be elevated. He is not on his statin or Zetia at this time due to careplan above. No side effects reported in the past but he was reporting diarrhea in the past and unsure of which medication. OBJECTIVE  Blood pressure 120/88, pulse 82, temperature 98.3 °F (36.8 °C), temperature source Temporal, resp. rate 16, height 5' 10\" (1.778 m), weight 207 lb (93.9 kg), SpO2 97 %. General:  Alert, cooperative, well appearing, in no apparent distress. Heart: normal S1S2, RRR, no murmurs. Chest: clear, no w/r/r. Results for orders placed or performed in visit on 04/14/22   AMB POC HEMOGLOBIN A1C   Result Value Ref Range    Hemoglobin A1c (POC) 7.3 %     ASSESSMENT / PLAN    ICD-10-CM ICD-9-CM    1. Type 2 diabetes mellitus without complication, without long-term current use of insulin (HCC)  E11.9 250.00 AMB POC HEMOGLOBIN B7X      METABOLIC PANEL, COMPREHENSIVE      LIPID PANEL      HEMOGLOBIN A1C W/O EAG      glyBURIDE (DIABETA) 5 mg tablet      empagliflozin (Jardiance) 25 mg tablet   2. Essential hypertension  I10 401.9    3. Mixed hyperlipidemia  E78.2 272.2 atorvastatin (LIPITOR) 80 mg tablet      METABOLIC PANEL, COMPREHENSIVE      LIPID PANEL   4. Medication side effects  T88. 7XXA 995.20    5. Medically noncompliant  Z91.19 V15.81      Uncontrolled type 2 diabetes mellitus with microalbuminuria (HCC)   - uncontrolled due to A1c at 7% or above. - cont glyburide and Jardiance. - Work on diet & exercise   - if he took his pills as prescribed his A1c would likely be controlled. - Check A1c in 2 months. - he has opted not to do an ACE inhibitor. Mixed hyperlipidemia  - Poorly controlled  - start back on Lipitor 80mg nightly. HTN   -today he is normotensive on recheck and declines meds anyway. Medication side effects  - we will add one med each month so we can properly vet side effects. Medically noncompliant   - he is counseled on med compliance. All chart history elements were reviewed by me at the time of the visit even though marked at time of note closure. Patient understands our medical plan. Patient has provided input and agrees with goals. Alternatives have been explained and offered. All questions answered. The patient is to call if condition worsens or fails to improve. Follow-up and Dispositions    · Return in about 8 weeks (around 6/9/2022) for routine care. Fasting labs due 3-7 days prior to appointment .

## 2022-06-08 ENCOUNTER — HOSPITAL ENCOUNTER (OUTPATIENT)
Dept: LAB | Age: 42
Discharge: HOME | End: 2022-06-08

## 2022-06-08 PROCEDURE — 99001 SPECIMEN HANDLING PT-LAB: CPT

## 2022-06-09 ENCOUNTER — OFFICE VISIT (OUTPATIENT)
Dept: FAMILY MEDICINE CLINIC | Age: 42
End: 2022-06-09
Payer: MEDICAID

## 2022-06-09 VITALS
OXYGEN SATURATION: 98 % | RESPIRATION RATE: 18 BRPM | HEART RATE: 78 BPM | DIASTOLIC BLOOD PRESSURE: 80 MMHG | HEIGHT: 70 IN | SYSTOLIC BLOOD PRESSURE: 126 MMHG | WEIGHT: 207 LBS | TEMPERATURE: 98.1 F | BODY MASS INDEX: 29.63 KG/M2

## 2022-06-09 DIAGNOSIS — I10 ESSENTIAL HYPERTENSION: ICD-10-CM

## 2022-06-09 DIAGNOSIS — E11.9 TYPE 2 DIABETES MELLITUS WITHOUT COMPLICATION, WITHOUT LONG-TERM CURRENT USE OF INSULIN (HCC): Primary | ICD-10-CM

## 2022-06-09 DIAGNOSIS — Z91.199 MEDICALLY NONCOMPLIANT: ICD-10-CM

## 2022-06-09 DIAGNOSIS — E78.2 MIXED HYPERLIPIDEMIA: ICD-10-CM

## 2022-06-09 LAB
ALBUMIN SERPL-MCNC: 4.6 G/DL (ref 4–5)
ALBUMIN/GLOB SERPL: 1.8 {RATIO} (ref 1.2–2.2)
ALP SERPL-CCNC: 89 IU/L (ref 44–121)
ALT SERPL-CCNC: 34 IU/L (ref 0–44)
AST SERPL-CCNC: 18 IU/L (ref 0–40)
BILIRUB SERPL-MCNC: 0.3 MG/DL (ref 0–1.2)
BUN SERPL-MCNC: 12 MG/DL (ref 6–24)
BUN/CREAT SERPL: 11 (ref 9–20)
CALCIUM SERPL-MCNC: 9.2 MG/DL (ref 8.7–10.2)
CHLORIDE SERPL-SCNC: 101 MMOL/L (ref 96–106)
CHOLEST SERPL-MCNC: 269 MG/DL (ref 100–199)
CO2 SERPL-SCNC: 22 MMOL/L (ref 20–29)
CREAT SERPL-MCNC: 1.13 MG/DL (ref 0.76–1.27)
EGFR: 83 ML/MIN/1.73
GLOBULIN SER CALC-MCNC: 2.6 G/DL (ref 1.5–4.5)
GLUCOSE SERPL-MCNC: 270 MG/DL (ref 65–99)
HBA1C MFR BLD: 10.3 % (ref 4.8–5.6)
HDLC SERPL-MCNC: 29 MG/DL
IMP & REVIEW OF LAB RESULTS: NORMAL
LDLC SERPL CALC-MCNC: 168 MG/DL (ref 0–99)
POTASSIUM SERPL-SCNC: 4.1 MMOL/L (ref 3.5–5.2)
PROT SERPL-MCNC: 7.2 G/DL (ref 6–8.5)
SODIUM SERPL-SCNC: 137 MMOL/L (ref 134–144)
TRIGL SERPL-MCNC: 372 MG/DL (ref 0–149)
VLDLC SERPL CALC-MCNC: 72 MG/DL (ref 5–40)

## 2022-06-09 PROCEDURE — 3046F HEMOGLOBIN A1C LEVEL >9.0%: CPT | Performed by: FAMILY MEDICINE

## 2022-06-09 PROCEDURE — 99214 OFFICE O/P EST MOD 30 MIN: CPT | Performed by: FAMILY MEDICINE

## 2022-06-09 RX ORDER — GLYBURIDE 5 MG/1
10 TABLET ORAL
Qty: 60 TABLET | Refills: 1 | Status: SHIPPED | OUTPATIENT
Start: 2022-06-09 | End: 2022-08-10 | Stop reason: SDUPTHER

## 2022-06-09 RX ORDER — ATORVASTATIN CALCIUM 80 MG/1
80 TABLET, FILM COATED ORAL
Qty: 30 TABLET | Refills: 1 | Status: SHIPPED | OUTPATIENT
Start: 2022-06-09 | End: 2022-08-10 | Stop reason: SDUPTHER

## 2022-06-09 NOTE — PROGRESS NOTES
Chief Complaint   Patient presents with    Results     review of results     Cholesterol Problem    Diabetes    Hypertension     SUBJECTIVE    Diabetes -  Patient presents for follow-up of diabetes. Due to patient stating he had side effects to all of his meds (mainly diarrhea), we stopped all but his glyburide and then we started Jardiance. He was doing well but then for an unknown reason he stopped his Jardiance since his last OV. He was taking them without side effects. Since his lst visit he says he was not working and sitting at home and consuming sweet tea all day long. He says he got a job now and that stopped. At his last visit he said he was taking his meds on average 5 days per week he estimates. He brought an old pill bottle that would lead me to believe he is taking it half of the time based on the date on the bottle and how many are left. Home glucose readings: checking intermittently. Patient denies symptomatic hypo- or hyperglycemia since last visit. Denies headaches, lightheadedness, dizziness, chest pain, SOB, heart palpitations, vomiting urinary frequency/urgency, skin changes/wounds, sensory disturbances     Says his father had several diabetic complications to include dialysis and amputations. Hyperlipidemia -   Taking statin most of the time. No side effects reported. OBJECTIVE  Blood pressure 126/80, pulse 78, temperature 98.1 °F (36.7 °C), temperature source Temporal, resp. rate 18, height 5' 10\" (1.778 m), weight 207 lb (93.9 kg), SpO2 98 %. General:  Alert, cooperative, well appearing, in no apparent distress. Heart: normal S1S2, RRR, no murmurs. Chest: clear, no w/r/r. Feet:  No pedal lesions, no erythema. Monofilament testing is intact. Vascularly intact. No deformity.      Results for orders placed or performed in visit on 36/82/37   METABOLIC PANEL, COMPREHENSIVE   Result Value Ref Range    Glucose 270 (H) 65 - 99 mg/dL    BUN 12 6 - 24 mg/dL Creatinine 1.13 0.76 - 1.27 mg/dL    eGFR 83 >59 mL/min/1.73    BUN/Creatinine ratio 11 9 - 20    Sodium 137 134 - 144 mmol/L    Potassium 4.1 3.5 - 5.2 mmol/L    Chloride 101 96 - 106 mmol/L    CO2 22 20 - 29 mmol/L    Calcium 9.2 8.7 - 10.2 mg/dL    Protein, total 7.2 6.0 - 8.5 g/dL    Albumin 4.6 4.0 - 5.0 g/dL    GLOBULIN, TOTAL 2.6 1.5 - 4.5 g/dL    A-G Ratio 1.8 1.2 - 2.2    Bilirubin, total 0.3 0.0 - 1.2 mg/dL    Alk. phosphatase 89 44 - 121 IU/L    AST (SGOT) 18 0 - 40 IU/L    ALT (SGPT) 34 0 - 44 IU/L   LIPID PANEL   Result Value Ref Range    Cholesterol, total 269 (H) 100 - 199 mg/dL    Triglyceride 372 (H) 0 - 149 mg/dL    HDL Cholesterol 29 (L) >39 mg/dL    VLDL, calculated 72 (H) 5 - 40 mg/dL    LDL, calculated 168 (H) 0 - 99 mg/dL   CVD REPORT   Result Value Ref Range    INTERPRETATION Note    HEMOGLOBIN A1C W/O EAG   Result Value Ref Range    Hemoglobin A1c 10.3 (H) 4.8 - 5.6 %   AMB POC HEMOGLOBIN A1C   Result Value Ref Range    Hemoglobin A1c (POC) 7.3 %     ASSESSMENT / PLAN    ICD-10-CM ICD-9-CM    1. Type 2 diabetes mellitus without complication, without long-term current use of insulin (HCC)  E11.9 250.00 empagliflozin (Jardiance) 25 mg tablet      glyBURIDE (DIABETA) 5 mg tablet   2. Essential hypertension  I10 401.9    3. Mixed hyperlipidemia  E78.2 272.2 atorvastatin (LIPITOR) 80 mg tablet   4. Medically noncompliant  Z91.19 V15.81      Uncontrolled type 2 diabetes mellitus with microalbuminuria (HCC)   - uncontrolled due to A1c at 7% or above. - advised on restarting Jardiance and taking glyburide daily  - Work on diet & exercise   - cut out sweet drinks  - Check A1c in 2 months. - he has opted not to do an ACE inhibitor. HTN   -today he is normotensive    Mixed hyperlipidemia  - Poorly controlled  - encourage daily Lipitor 80mg nightly. Medically noncompliant   - he is counseled on med compliance.      All chart history elements were reviewed by me at the time of the visit even though marked at time of note closure. Patient understands our medical plan. Patient has provided input and agrees with goals. Alternatives have been explained and offered. All questions answered. The patient is to call if condition worsens or fails to improve. Follow-up and Dispositions    · Return in about 2 months (around 8/9/2022) for DM and A1C in office .

## 2022-06-09 NOTE — PATIENT INSTRUCTIONS

## 2022-06-09 NOTE — PROGRESS NOTES
Chief Complaint   Patient presents with    Results     review of results     Cholesterol Problem    Diabetes    Hypertension         1. \"Have you been to the ER, urgent care clinic since your last visit? Hospitalized since your last visit? \" No    2. \"Have you seen or consulted any other health care providers outside of the [address] [place] [address] since your last visit? \" No     3. For patients aged 39-70: Has the patient had a colonoscopy / FIT/ Cologuard? NA - based on age      If the patient is female:    4. For patients aged 41-77: Has the patient had a mammogram within the past 2 years? NA - based on age or sex      11. For patients aged 21-65: Has the patient had a pap smear? NA - based on age or sex     Annual eye exam: 01/04/2021  Pneumococcal vaccine: 03/06/2019  Flu vaccine: 11/11/2021  Patient instructed to remove shoes:  Yes

## 2022-06-13 PROBLEM — E11.65 TYPE 2 DIABETES MELLITUS WITH HYPERGLYCEMIA, WITHOUT LONG-TERM CURRENT USE OF INSULIN (HCC): Status: ACTIVE | Noted: 2019-09-11

## 2022-06-15 LAB — XX-LABCORP SPECIMEN COL,LCBCF: NORMAL

## 2022-08-09 NOTE — PROGRESS NOTES
Chief Complaint   Patient presents with    Diabetes        1. \"Have you been to the ER, urgent care clinic since your last visit? Hospitalized since your last visit? \" No    2. \"Have you seen or consulted any other health care providers outside of the [address] [place] [address] since your last visit? \" No     3. For patients aged 39-70: Has the patient had a colonoscopy / FIT/ Cologuard? NA - based on age      If the patient is female:    4. For patients aged 41-77: Has the patient had a mammogram within the past 2 years? NA - based on age or sex      11. For patients aged 21-65: Has the patient had a pap smear?  NA - based on age or sex     Annual eye exam: 01/04/2021  Pneumococcal vaccine: 03/06/2019  Flu vaccine: 11/11/2021  Patient instructed to remove shoes: completed 06- due 06-

## 2022-08-10 ENCOUNTER — OFFICE VISIT (OUTPATIENT)
Dept: FAMILY MEDICINE CLINIC | Age: 42
End: 2022-08-10
Payer: MEDICAID

## 2022-08-10 VITALS
TEMPERATURE: 98.3 F | BODY MASS INDEX: 31.7 KG/M2 | HEART RATE: 110 BPM | HEIGHT: 69 IN | RESPIRATION RATE: 18 BRPM | DIASTOLIC BLOOD PRESSURE: 80 MMHG | WEIGHT: 214 LBS | SYSTOLIC BLOOD PRESSURE: 132 MMHG | OXYGEN SATURATION: 100 %

## 2022-08-10 DIAGNOSIS — I10 ESSENTIAL HYPERTENSION: ICD-10-CM

## 2022-08-10 DIAGNOSIS — E78.2 MIXED HYPERLIPIDEMIA: ICD-10-CM

## 2022-08-10 DIAGNOSIS — Z91.199 MEDICALLY NONCOMPLIANT: ICD-10-CM

## 2022-08-10 DIAGNOSIS — E11.9 TYPE 2 DIABETES MELLITUS WITHOUT COMPLICATION, WITHOUT LONG-TERM CURRENT USE OF INSULIN (HCC): Primary | ICD-10-CM

## 2022-08-10 DIAGNOSIS — E66.9 OBESITY WITH SERIOUS COMORBIDITY, UNSPECIFIED CLASSIFICATION, UNSPECIFIED OBESITY TYPE: ICD-10-CM

## 2022-08-10 PROCEDURE — 3052F HG A1C>EQUAL 8.0%<EQUAL 9.0%: CPT | Performed by: FAMILY MEDICINE

## 2022-08-10 PROCEDURE — 99214 OFFICE O/P EST MOD 30 MIN: CPT | Performed by: FAMILY MEDICINE

## 2022-08-10 PROCEDURE — 83036 HEMOGLOBIN GLYCOSYLATED A1C: CPT | Performed by: FAMILY MEDICINE

## 2022-08-10 RX ORDER — ATORVASTATIN CALCIUM 80 MG/1
80 TABLET, FILM COATED ORAL
Qty: 30 TABLET | Refills: 1 | Status: SHIPPED | OUTPATIENT
Start: 2022-08-10 | End: 2022-10-12 | Stop reason: SDUPTHER

## 2022-08-10 RX ORDER — GLYBURIDE 5 MG/1
10 TABLET ORAL
Qty: 60 TABLET | Refills: 1 | Status: SHIPPED | OUTPATIENT
Start: 2022-08-10 | End: 2022-10-12 | Stop reason: SDUPTHER

## 2022-08-10 NOTE — PATIENT INSTRUCTIONS
A Healthy Lifestyle: Care Instructions  Your Care Instructions     A healthy lifestyle can help you feel good, stay at a healthy weight, and have plenty of energy for both work and play. A healthy lifestyle is something you can share with your whole family. A healthy lifestyle also can lower your risk for serious health problems, such as high blood pressure, heart disease, and diabetes. You can follow a few steps listed below to improve your health and the health of your family. Follow-up care is a key part of your treatment and safety. Be sure to make and go to all appointments, and call your doctor if you are having problems. It's also a good idea to know your test results and keep a list of the medicines you take. How can you care for yourself at home? Do not eat too much sugar, fat, or fast foods. You can still have dessert and treats now and then. The goal is moderation. Start small to improve your eating habits. Pay attention to portion sizes, drink less juice and soda pop, and eat more fruits and vegetables. Eat a healthy amount of food. A 3-ounce serving of meat, for example, is about the size of a deck of cards. Fill the rest of your plate with vegetables and whole grains. Limit the amount of soda and sports drinks you have every day. Drink more water when you are thirsty. Eat plenty of fruits and vegetables every day. Have an apple or some carrot sticks as an afternoon snack instead of a candy bar. Try to have fruits and/or vegetables at every meal.  Make exercise part of your daily routine. You may want to start with simple activities, such as walking, bicycling, or slow swimming. Try to be active 30 to 60 minutes every day. You do not need to do all 30 to 60 minutes all at once. For example, you can exercise 3 times a day for 10 or 20 minutes. Moderate exercise is safe for most people, but it is always a good idea to talk to your doctor before starting an exercise program.  Keep moving.  Rolf Mccloud the lawn, work in the garden, or clean your house. Take the stairs instead of the elevator at work. If you smoke, quit. People who smoke have an increased risk for heart attack, stroke, cancer, and other lung illnesses. Quitting is hard, but there are ways to boost your chance of quitting tobacco for good. Use nicotine gum, patches, or lozenges. Ask your doctor about stop-smoking programs and medicines. Keep trying. In addition to reducing your risk of diseases in the future, you will notice some benefits soon after you stop using tobacco. If you have shortness of breath or asthma symptoms, they will likely get better within a few weeks after you quit. Limit how much alcohol you drink. Moderate amounts of alcohol (up to 2 drinks a day for men, 1 drink a day for women) are okay. But drinking too much can lead to liver problems, high blood pressure, and other health problems. Family health  If you have a family, there are many things you can do together to improve your health. Eat meals together as a family as often as possible. Eat healthy foods. This includes fruits, vegetables, lean meats and dairy, and whole grains. Include your family in your fitness plan. Most people think of activities such as jogging or tennis as the way to fitness, but there are many ways you and your family can be more active. Anything that makes you breathe hard and gets your heart pumping is exercise. Here are some tips:  Walk to do errands or to take your child to school or the bus. Go for a family bike ride after dinner instead of watching TV. Where can you learn more? Go to http://www.gray.com/  Enter D977 in the search box to learn more about \"A Healthy Lifestyle: Care Instructions. \"  Current as of: June 16, 2021               Content Version: 13.2  © 5424-1618 [employer]wise, Incorporated.    Care instructions adapted under license by [employer] (which disclaims liability or warranty for this information). If you have questions about a medical condition or this instruction, always ask your healthcare professional. [place] [address] any warranty or liability for your use of this information.

## 2022-08-11 LAB — HBA1C MFR BLD HPLC: 8.7 %

## 2022-08-11 NOTE — PROGRESS NOTES
Chief Complaint   Patient presents with    Diabetes     SUBJECTIVE    Diabetes -  Patient presents for follow-up of diabetes. Due to patient stating he had side effects to all of his meds (mainly diarrhea), we stopped all but his glyburide and then we started Jardiance. He was doing well but then for an unknown reason he stopped his Jardiance since his last OV. He was taking them without side effects. Since his lst visit he says he was not working and sitting at home and consuming sweet tea all day long. He says he got a job now and that stopped. He says he took his meds for 2 weeks straight after he saw us last and then took them maybe 50% of the time at best.  He is unsure why he loses motivation to keep up. Patient denies symptomatic hypo- or hyperglycemia since last visit. Denies headaches, lightheadedness, dizziness, chest pain, SOB, heart palpitations, vomiting urinary frequency/urgency, skin changes/wounds, sensory disturbances     Says his father had several diabetic complications to include dialysis and amputations. Hyperlipidemia -   Taking statin some of the time. No side effects reported. OBJECTIVE  Blood pressure 132/80, pulse (!) 110, temperature 98.3 °F (36.8 °C), temperature source Temporal, resp. rate 18, height 5' 9\" (1.753 m), weight 214 lb (97.1 kg), SpO2 100 %. General:  Alert, cooperative, well appearing, in no apparent distress. Heart: normal S1S2, RRR, no murmurs. Chest: clear, no w/r/r. Feet:  No pedal lesions, no erythema. Monofilament testing is intact. Vascularly intact. No deformity. Results for orders placed or performed in visit on 08/10/22   AMB POC HEMOGLOBIN A1C   Result Value Ref Range    Hemoglobin A1c (POC) 8.7 %     ASSESSMENT / PLAN    ICD-10-CM ICD-9-CM    1.  Type 2 diabetes mellitus without complication, without long-term current use of insulin ([facility])  E11.9 250.00 AMB POC HEMOGLOBIN A1C      empagliflozin (Jardiance) 25 mg tablet glyBURIDE (DIABETA) 5 mg tablet      2. Essential hypertension  I10 401.9       3. Mixed hyperlipidemia  E78.2 272.2 atorvastatin (LIPITOR) 80 mg tablet      4. Obesity with serious comorbidity, unspecified classification, unspecified obesity type  E66.9 278.00         Uncontrolled type 2 diabetes mellitus with microalbuminuria ([facility] [address]   - uncontrolled due to A1c at 7% or above. - this is due to med nonadhearence. - advised on consistency with Jardiance and glyburide   - Work on diet & exercise   - Check A1c in 2 months. - he has opted not to do an ACE inhibitor. HTN   -today he is normotensive    Mixed hyperlipidemia  - Poorly controlled due to noncompliance in the past  - encourage daily Lipitor 80mg nightly. Obesity   - diet and exercise. Medically noncompliant   - he is counseled on med compliance and consistency    All chart history elements were reviewed by me at the time of the visit even though marked at time of note closure. Patient understands our medical plan. Patient has provided input and agrees with goals. Alternatives have been explained and offered. All questions answered. The patient is to call if condition worsens or fails to improve. Follow-up and Dispositions    Return in about 2 months (around 10/10/2022) for DM and POC A1c in office .

## 2022-09-01 ENCOUNTER — TELEPHONE (OUTPATIENT)
Dept: FAMILY MEDICINE CLINIC | Age: 42
End: 2022-09-01

## 2022-09-01 NOTE — TELEPHONE ENCOUNTER
Pt states that his right hand is swollen for no reason. No injury of bug bite that he can till. He would like to know if it could be from his diabetes. Please advise.

## 2022-09-02 NOTE — TELEPHONE ENCOUNTER
Spoke with patient who complains of right hand swelling and numbness x 2 days. Patient advised to go to urgent care for evaluation. He voices understanding.

## 2022-10-12 ENCOUNTER — OFFICE VISIT (OUTPATIENT)
Dept: FAMILY MEDICINE CLINIC | Age: 42
End: 2022-10-12
Payer: MEDICAID

## 2022-10-12 VITALS
OXYGEN SATURATION: 97 % | BODY MASS INDEX: 31.1 KG/M2 | RESPIRATION RATE: 14 BRPM | TEMPERATURE: 97.5 F | HEIGHT: 69 IN | HEART RATE: 93 BPM | WEIGHT: 210 LBS | DIASTOLIC BLOOD PRESSURE: 76 MMHG | SYSTOLIC BLOOD PRESSURE: 112 MMHG

## 2022-10-12 DIAGNOSIS — E66.9 OBESITY WITH SERIOUS COMORBIDITY, UNSPECIFIED CLASSIFICATION, UNSPECIFIED OBESITY TYPE: ICD-10-CM

## 2022-10-12 DIAGNOSIS — E11.9 TYPE 2 DIABETES MELLITUS WITHOUT COMPLICATION, WITHOUT LONG-TERM CURRENT USE OF INSULIN (HCC): Primary | ICD-10-CM

## 2022-10-12 DIAGNOSIS — I10 ESSENTIAL HYPERTENSION: ICD-10-CM

## 2022-10-12 DIAGNOSIS — Z23 ENCOUNTER FOR IMMUNIZATION: ICD-10-CM

## 2022-10-12 DIAGNOSIS — E78.2 MIXED HYPERLIPIDEMIA: ICD-10-CM

## 2022-10-12 LAB — HBA1C MFR BLD HPLC: 8.3 %

## 2022-10-12 PROCEDURE — 3052F HG A1C>EQUAL 8.0%<EQUAL 9.0%: CPT | Performed by: FAMILY MEDICINE

## 2022-10-12 PROCEDURE — 99214 OFFICE O/P EST MOD 30 MIN: CPT | Performed by: FAMILY MEDICINE

## 2022-10-12 PROCEDURE — 90686 IIV4 VACC NO PRSV 0.5 ML IM: CPT | Performed by: FAMILY MEDICINE

## 2022-10-12 PROCEDURE — 90715 TDAP VACCINE 7 YRS/> IM: CPT | Performed by: FAMILY MEDICINE

## 2022-10-12 PROCEDURE — 83036 HEMOGLOBIN GLYCOSYLATED A1C: CPT | Performed by: FAMILY MEDICINE

## 2022-10-12 RX ORDER — GLYBURIDE 5 MG/1
10 TABLET ORAL
Qty: 60 TABLET | Refills: 1 | Status: SHIPPED | OUTPATIENT
Start: 2022-10-12

## 2022-10-12 RX ORDER — ATORVASTATIN CALCIUM 80 MG/1
80 TABLET, FILM COATED ORAL
Qty: 30 TABLET | Refills: 1 | Status: SHIPPED | OUTPATIENT
Start: 2022-10-12

## 2022-10-12 NOTE — PROGRESS NOTES
1. \"Have you been to the ER, urgent care clinic since your last visit? Hospitalized since your last visit? \" No    2. \"Have you seen or consulted any other health care providers outside of the [address] [place] [address] since your last visit? \" No     3. For patients aged 39-70: Has the patient had a colonoscopy / FIT/ Cologuard? NA - based on age      If the patient is female:    4. For patients aged 41-77: Has the patient had a mammogram within the past 2 years? NA - based on age or sex      11. For patients aged 21-65: Has the patient had a pap smear? NA - based on age or sex    Physician order obtained. Patient completed adult immunization consent form. Allergies, contraindications and recommendations reviewed with patient. Seasonal influenza vaccine administered IM left deltoid and TDAP vaccine administered IM right deltoid. Patient tolerated well. Patient remained in office for 15 minutes after injection and no adverse reactions were noted. Name band;

## 2022-10-12 NOTE — PROGRESS NOTES
Chief Complaint   Patient presents with    Diabetes     SUBJECTIVE    Diabetes -  Patient presents for follow-up of diabetes. He is taking his meds 3 days per week. No side effects reported. Says his father had several diabetic complications to include dialysis and amputations. Hyperlipidemia -   Taking statin some of the time. No side effects reported. OBJECTIVE  Blood pressure 112/76, pulse 93, temperature 97.5 °F (36.4 °C), temperature source Temporal, resp. rate 14, height 5' 9\" (1.753 m), weight 230 lb (104.3 kg), SpO2 97 %. General:  Alert, cooperative, well appearing, in no apparent distress. Results for orders placed or performed in visit on 10/12/22   AMB POC HEMOGLOBIN A1C   Result Value Ref Range    Hemoglobin A1c (POC) 8.3 %       ASSESSMENT / PLAN    ICD-10-CM ICD-9-CM    1. Type 2 diabetes mellitus without complication, without long-term current use of insulin (HCC)  E11.9 250.00 AMB POC HEMOGLOBIN A1C      2. Essential hypertension  I10 401.9       3. Mixed hyperlipidemia  E78.2 272.2       4. Obesity with serious comorbidity, unspecified classification, unspecified obesity type  E66.9 278.00       5. Encounter for immunization  Z23 V03.89 UT IMMUNIZ,ADMIN,EACH ADDL      INFLUENZA, FLUARIX, FLULAVAL, FLUZONE (AGE 6 MO+), AFLURIA(AGE 3Y+) IM, PF, 0.5 ML      TDAP, BOOSTRIX, (AGE 10 YRS+), IM        Uncontrolled type 2 diabetes mellitus with microalbuminuria ([facility] [address]   - uncontrolled due to A1c at 7% or above. - this is due to med nonadhearence. - advised on consistency with Jardiance and glyburide   - Work on diet & exercise   - Check A1c in 2 months. - he has opted not to do an ACE inhibitor. HTN   -today he is normotensive    Mixed hyperlipidemia  - Poorly controlled due to noncompliance in the past  - encourage daily Lipitor 80mg nightly. Obesity   - diet and exercise.     Medically noncompliant   - he is counseled on med compliance and consistency    Flu and Tdap vaccine administered    All chart history elements were reviewed by me at the time of the visit even though marked at time of note closure. Patient understands our medical plan. Patient has provided input and agrees with goals. Alternatives have been explained and offered. All questions answered. The patient is to call if condition worsens or fails to improve. Follow-up and Dispositions    Return in about 2 months (around 12/12/2022) for routine care. A1c to be done in office.

## 2022-12-01 ENCOUNTER — OFFICE VISIT (OUTPATIENT)
Dept: FAMILY MEDICINE CLINIC | Age: 42
End: 2022-12-01
Payer: MEDICAID

## 2022-12-01 VITALS
BODY MASS INDEX: 30.51 KG/M2 | RESPIRATION RATE: 14 BRPM | TEMPERATURE: 98.4 F | DIASTOLIC BLOOD PRESSURE: 84 MMHG | HEIGHT: 69 IN | WEIGHT: 206 LBS | SYSTOLIC BLOOD PRESSURE: 130 MMHG | OXYGEN SATURATION: 97 % | HEART RATE: 101 BPM

## 2022-12-01 DIAGNOSIS — S16.1XXA STRAIN OF NECK MUSCLE, INITIAL ENCOUNTER: Primary | ICD-10-CM
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
## 2022-12-01 PROCEDURE — 99212 OFFICE O/P EST SF 10 MIN: CPT | Performed by: FAMILY MEDICINE

## 2022-12-01 PROCEDURE — 3078F DIAST BP <80 MM HG: CPT | Performed by: FAMILY MEDICINE

## 2022-12-01 PROCEDURE — 3074F SYST BP LT 130 MM HG: CPT | Performed by: FAMILY MEDICINE

## 2022-12-01 NOTE — PROGRESS NOTES
1. \"Have you been to the ER, urgent care clinic since your last visit? Hospitalized since your last visit? \" Yes Where: [facility] 11/25/22     2. \"Have you seen or consulted any other health care providers outside of the [address] [place] [address] since your last visit? \" No     3. For patients aged 39-70: Has the patient had a colonoscopy / FIT/ Cologuard? NA - based on age      If the patient is female:    4. For patients aged 41-77: Has the patient had a mammogram within the past 2 years? NA - based on age or sex      11. For patients aged 21-65: Has the patient had a pap smear?  NA - based on age or sex
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
## 2022-12-02 ENCOUNTER — TELEPHONE (OUTPATIENT)
Dept: PHYSICAL THERAPY | Age: 42
End: 2022-12-02

## 2022-12-14 ENCOUNTER — OFFICE VISIT (OUTPATIENT)
Dept: FAMILY MEDICINE CLINIC | Age: 42
End: 2022-12-14
Payer: MEDICAID

## 2022-12-14 VITALS
HEART RATE: 84 BPM | SYSTOLIC BLOOD PRESSURE: 126 MMHG | RESPIRATION RATE: 16 BRPM | HEIGHT: 70 IN | BODY MASS INDEX: 30.06 KG/M2 | OXYGEN SATURATION: 97 % | DIASTOLIC BLOOD PRESSURE: 82 MMHG | WEIGHT: 210 LBS | TEMPERATURE: 97.7 F

## 2022-12-14 DIAGNOSIS — E11.9 TYPE 2 DIABETES MELLITUS WITHOUT COMPLICATION, WITHOUT LONG-TERM CURRENT USE OF INSULIN (HCC): Primary | ICD-10-CM

## 2022-12-14 DIAGNOSIS — J02.0 STREP PHARYNGITIS: ICD-10-CM

## 2022-12-14 DIAGNOSIS — J02.9 SORE THROAT: ICD-10-CM

## 2022-12-14 DIAGNOSIS — Z91.199 MEDICALLY NONCOMPLIANT: ICD-10-CM

## 2022-12-14 DIAGNOSIS — H65.91 RIGHT NON-SUPPURATIVE OTITIS MEDIA: ICD-10-CM

## 2022-12-14 DIAGNOSIS — I10 ESSENTIAL HYPERTENSION: ICD-10-CM

## 2022-12-14 DIAGNOSIS — E66.9 OBESITY WITH SERIOUS COMORBIDITY, UNSPECIFIED CLASSIFICATION, UNSPECIFIED OBESITY TYPE: ICD-10-CM

## 2022-12-14 DIAGNOSIS — L03.011 ACUTE PARONYCHIA OF FINGER OF RIGHT HAND: ICD-10-CM

## 2022-12-14 DIAGNOSIS — E78.2 MIXED HYPERLIPIDEMIA: ICD-10-CM

## 2022-12-14 LAB
HBA1C MFR BLD HPLC: 8.9 %
S PYO AG THROAT QL: POSITIVE
VALID INTERNAL CONTROL?: YES

## 2022-12-14 RX ORDER — PENICILLIN V POTASSIUM 500 MG/1
500 TABLET, FILM COATED ORAL 3 TIMES DAILY
Qty: 30 TABLET | Refills: 0 | Status: SHIPPED | OUTPATIENT
Start: 2022-12-14 | End: 2022-12-24

## 2022-12-14 NOTE — LETTER
NOTIFICATION RETURN TO WORK     12/14/2022 4:55 PM    Mr. Yashira Akers [phone]      To Whom It May Concern:    Trey Baker is currently under the care of [facility]. He will return to work on: 12/16/2022    Please excuse his absences 12/14/2022 and 12/15/2022. If there are questions or concerns please have the patient contact our office.         Sincerely,      Kimberly Lizarraga MD

## 2022-12-14 NOTE — PATIENT INSTRUCTIONS

## 2022-12-14 NOTE — PROGRESS NOTES
Chief Complaint   Patient presents with    Diabetes    Sore Throat    Hearing Loss     Difficulty hearing- R ear      SUBJECTIVE    Diabetes -  Patient presents for follow-up of diabetes. He is not consistently taking his meds. No side effects reported. Says his father had several diabetic complications to include dialysis and amputations. Hyperlipidemia -   Taking statin some of the time. No side effects reported. Has sore throat and right ear congestion with decreased hearing. Has also had right     OBJECTIVE  Blood pressure 126/82, pulse 84, temperature 97.7 °F (36.5 °C), temperature source Temporal, resp. rate 16, height 5' 10\" (1.778 m), weight 210 lb (95.3 kg), SpO2 97 %. General:  Alert, cooperative, well appearing, in no apparent distress. HEENT:  right TM is opaque and erythematous. Erythema of oropharynx. No tonsillar enlargement. Chest: clear, no w/r/r. Heart:  normal S1S2, RRR, no murmurs. Skin:  Right 4th fingernail with radial sided mild swelling. Results for orders placed or performed in visit on 12/14/22   AMB POC HEMOGLOBIN A1C   Result Value Ref Range    Hemoglobin A1c (POC) 8.9 %   AMB POC RAPID STREP A   Result Value Ref Range    VALID INTERNAL CONTROL POC Yes     Group A Strep Ag Positive Negative     ASSESSMENT / PLAN    ICD-10-CM ICD-9-CM    1. Type 2 diabetes mellitus without complication, without long-term current use of insulin ([facility])  E11.9 250.00 AMB POC HEMOGLOBIN A1C      2. Essential hypertension  I10 401.9       3. Mixed hyperlipidemia  E78.2 272.2       4. Obesity with serious comorbidity, unspecified classification, unspecified obesity type  E66.9 278.00       5. Sore throat  J02.9 462 AMB POC RAPID STREP A      6. Strep pharyngitis  J02.0 034.0       7. Acute paronychia of finger of right hand  L03.011 681.02       8. Right non-suppurative otitis media  H65.91 381.4       9.  Medically noncompliant  Z91.199 V15.81         Uncontrolled type 2 diabetes mellitus with microalbuminuria ([facility] [address]   - uncontrolled due to A1c at 7% or above. It is actually worsening  - this is due to med nonadhearence. - advised on consistency with Jardiance and glyburide   - Work on diet & exercise   - Check A1c in 3 months. - he has opted not to do an ACE inhibitor. HTN   -today he is normotensive    Mixed hyperlipidemia  - Poorly controlled due to noncompliance in the past  - encourage daily Lipitor 80mg nightly. Obesity   - diet and exercise. Sore throat secondary to strep   Otitis media  - treat with pen VK for positive strep test.    Paronychia   -he went to the ER and this had imrpoved. - he will use epsom salt soaks 3 times per day and monitor to resolution. Medically noncompliant   - he is counseled on med compliance and consistency   - patient is a non-participant in his healthcare when he is on his own. - I have shaped with him that I am concerned about his noncompliance increasingly so and am worried about a bad outcome at an early age. All chart history elements were reviewed by me at the time of the visit even though marked at time of note closure. Patient understands our medical plan. Patient has provided input and agrees with goals. Alternatives have been explained and offered. All questions answered. The patient is to call if condition worsens or fails to improve. Follow-up and Dispositions    Return for routine care. A1c to be done in office.

## 2022-12-19 ENCOUNTER — HOSPITAL ENCOUNTER (OUTPATIENT)
Dept: PHYSICAL THERAPY | Age: 42
Discharge: HOME | End: 2022-12-19
Payer: MEDICAID

## 2022-12-19 PROCEDURE — 97162 PT EVAL MOD COMPLEX 30 MIN: CPT
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
## 2022-12-27 ENCOUNTER — HOSPITAL ENCOUNTER (OUTPATIENT)
Dept: PHYSICAL THERAPY | Age: 42
Discharge: HOME | End: 2022-12-27
Payer: MEDICAID

## 2022-12-27 PROCEDURE — 97112 NEUROMUSCULAR REEDUCATION: CPT

## 2022-12-27 PROCEDURE — 97110 THERAPEUTIC EXERCISES: CPT

## 2022-12-27 PROCEDURE — 97530 THERAPEUTIC ACTIVITIES: CPT

## 2022-12-27 NOTE — PROGRESS NOTES
PT DAILY TREATMENT NOTE     Patient Name: Francesca Gonzales  Date:2022  : 1980  [x]  Patient  Verified  Payor: Jeri Fleet / Plan: 1 [id number] / Product Type: Managed Care Medicaid /    In time:1:33  Out time:2:15  Total Treatment Time (min): 42  Visit #: 2 of 8    Treatment Area: Neck pain [M54.2]    SUBJECTIVE  Pain Level (0-10 scale): 5/10  Any medication changes, allergies to medications, adverse drug reactions, diagnosis change, or new procedure performed?: [x] No    [] Yes (see summary sheet for update)  Subjective functional status/changes:   [] No changes reported  \"It's a little better since the first day. \"    OBJECTIVE    Modality rationale: decrease pain and increase tissue extensibility to improve the patients ability to perform ADL's.    Min Type Additional Details    [] Estim:  []Unatt       []IFC  []Premod                        []Other:  []w/ice   []w/heat  Position:  Location:    [] Estim: []Att    []TENS instruct  []NMES                    []Other:  []w/US   []w/ice   []w/heat  Position:  Location:    []  Traction: [] Cervical       []Lumbar                       [] Prone          []Supine                       []Intermittent   []Continuous Lbs:  [] before manual  [] after manual    []  Ultrasound: []Continuous   [] Pulsed                           []1MHz   []3MHz W/cm2:  Location:    []  Iontophoresis with dexamethasone         Location: [] Take home patch   [] In clinic   10 []  Ice     [x]  heat  []  Ice massage  []  Laser   []  Anodyne Position: Seated  Location: C/S    []  Laser with stim  []  Other:  Position:  Location:    []  Vasopneumatic Device    []  Right     []  Left  Pre-treatment girth:  Post-treatment girth:  Measured at (location):  Pressure:       [] lo [] med [] hi   Temperature: [] lo [] med [] hi   [] Skin assessment post-treatment:  []intact []redness- no adverse reaction    []redness - adverse reaction:     12 min Therapeutic Exercise:  [x] See flow sheet :   Rationale: increase ROM and increase strength to improve the patients ability to perform ADL's.    8 min Therapeutic Activity:  [x]  See flow sheet : Side-lying PRE's, wall slides. Rationale: increase ROM, increase strength, and increase proprioception  to improve the patients ability to perform ADL's.     12 min Neuromuscular Re-education:  [x]  See flow sheet : Prone scap series, deflated ball supine series. Rationale: increase strength, improve coordination, and increase proprioception  to improve the patients ability to perform functional activities. With   [x] TE   [] TA   [] neuro   [] other: Patient Education: [x] Review HEP    [] Progressed/Changed HEP based on:   [] positioning   [] body mechanics   [] transfers   [] heat/ice application    [] other:      Other Objective/Functional Measures: Initiated exercises per flow sheet. Pain Level (0-10 scale) post treatment: 4/10    ASSESSMENT/Changes in Function: First F/U visit. Pt performed exercises fairly well, expressed mild discomfort with isometrics and with prone scap retraction. Continue PT to decrease mm restrictions and increase C/S mobility to improve ease with performing functional activities. Patient will continue to benefit from skilled PT services to modify and progress therapeutic interventions, address functional mobility deficits, address ROM deficits, address strength deficits, analyze and address soft tissue restrictions, analyze and cue movement patterns, and analyze and modify body mechanics/ergonomics to attain remaining goals. [x]  See Plan of Care  []  See progress note/recertification  []  See Discharge Summary         Progress towards goals / Updated goals:  Short Term Goals: To be accomplished in 2 weeks:               1. Patient will be independent and compliant with HEP 1-2x/day to increase ease with ADls safely.                 Eval: HEP established  Current: Pt reports HEP compliance. 12/27/2022                2. Patient will improve cervical AROM rotation to 75 deg (B) to increase ease with driving. Eval: cervical rotation AROM: right: 72deg, left: 55deg   Long Term Goals: To be accomplished in 4 weeks:               1. Patient will improve FOTO to at least 55 to demonstrate improved function. Eval: FOTO: 68               2. Patient will report sleeping through the night with waking only 1x/night due to discomfort to assist with return to PLOF. Eval: waking due to pain approximately 5x/night               3. Patient will improve cervical flexion to 40deg with pain no more then 3/10 to increase ease with reading. Eval: cervical AROM flexion: 27deg with 7/10 pain reported. 4. Patient will repot average pain level no more then 3/10 to increase ease with work duties. Eval; Patient reports average pain level is 6/10.      PLAN  []  Upgrade activities as tolerated     [x]  Continue plan of care  []  Update interventions per flow sheet       []  Discharge due to:_  []  Other:_      Alana Finch PTA 12/27/2022  1:35 PM    Future Appointments   Date Time Provider Juan Viramontes   12/29/2022 10:30 AM Neva [place], PTA [facility]   1/3/2023  5:00 PM Neva [place], PTA [facility]   1/6/2023  4:30 PM Hali [place], PTA [facility]   1/10/2023  5:00 PM Neva [place], PTA [facility]   1/13/2023  4:30 PM Hali Woods, PTA [facility]   1/17/2023  5:00 PM Neva , PTA [facility]

## 2022-12-29 ENCOUNTER — HOSPITAL ENCOUNTER (OUTPATIENT)
Dept: PHYSICAL THERAPY | Age: 42
End: 2022-12-29
Payer: MEDICAID

## 2022-12-29 PROCEDURE — 97110 THERAPEUTIC EXERCISES: CPT

## 2022-12-29 PROCEDURE — 97112 NEUROMUSCULAR REEDUCATION: CPT

## 2022-12-29 PROCEDURE — 97530 THERAPEUTIC ACTIVITIES: CPT

## 2022-12-29 NOTE — PROGRESS NOTES
PT DAILY TREATMENT NOTE     Patient Name: Yelitza Orellana  Date:2022  : 1980  [x]  Patient  Verified  Payor: Clementine Escort / Plan: 1 [place] [id number] / Product Type: Managed Care Medicaid /    In time:10:33  Out time:11:12  Total Treatment Time (min): 39  Visit #: 3 of 8    Treatment Area: Neck pain [M54.2]    SUBJECTIVE  Pain Level (0-10 scale): 10  Any medication changes, allergies to medications, adverse drug reactions, diagnosis change, or new procedure performed?: [x] No    [] Yes (see summary sheet for update)  Subjective functional status/changes:   [x] No changes reported    OBJECTIVE    19 min Therapeutic Exercise:  [x] See flow sheet :   Rationale: increase ROM and increase strength to improve the patients ability to perform ADL's.    8 min Therapeutic Activity:  [x]  See flow sheet : Side-lying PRE's, wall slides. Rationale: increase strength, improve coordination, and increase proprioception  to improve the patients ability to perform functional activities. 12 min Neuromuscular Re-education:  [x]  See flow sheet : Prone scap series, deflated ball supine series, table SA punch. Rationale: increase ROM, increase strength, improve coordination, and increase proprioception  to improve the patients ability to perform functional activities. With   [x] TE   [] TA   [] neuro   [] other: Patient Education: [x] Review HEP    [] Progressed/Changed HEP based on:   [] positioning   [] body mechanics   [] transfers   [] heat/ice application    [] other:      Other Objective/Functional Measures: AROM C/S Rotation Right 60, Left 70 degrees. Added low table standing SA punch 10 reps. Pain Level (0-10 scale) post treatment: 4/10    ASSESSMENT/Changes in Function: Demonstrates improved C/S Rot mobility. Performs exercises slowly. Reviewed HEP. Pt reported a slight decrease in pain level post-treatment.  Continue PT to decrease mm restrictions, further improve C/S mobility to improve ease with performing functional activities. Patient will continue to benefit from skilled PT services to modify and progress therapeutic interventions, address functional mobility deficits, address ROM deficits, address strength deficits, analyze and address soft tissue restrictions, analyze and cue movement patterns, and analyze and modify body mechanics/ergonomics to attain remaining goals. [x]  See Plan of Care  []  See progress note/recertification  []  See Discharge Summary         Progress towards goals / Updated goals:  Short Term Goals: To be accomplished in 2 weeks:               1. Patient will be independent and compliant with HEP 1-2x/day to increase ease with ADls safely. Eval: HEP established  Current: Pt reports HEP compliance. 12/27/2022                2. Patient will improve cervical AROM rotation to 75 deg (B) to increase ease with driving. Eval: cervical rotation AROM: right: 72deg, left: 55deg  Current: AROM C/S Rotation Right 60, Left 70 degrees. 12/29/2022   Long Term Goals: To be accomplished in 4 weeks:               1. Patient will improve FOTO to at least 55 to demonstrate improved function. Eval: FOTO: 68               2. Patient will report sleeping through the night with waking only 1x/night due to discomfort to assist with return to PLOF. Eval: waking due to pain approximately 5x/night               3. Patient will improve cervical flexion to 40deg with pain no more then 3/10 to increase ease with reading. Eval: cervical AROM flexion: 27deg with 7/10 pain reported. 4. Patient will repot average pain level no more then 3/10 to increase ease with work duties. Eval; Patient reports average pain level is 6/10.      PLAN  []  Upgrade activities as tolerated     [x]  Continue plan of care  []  Update interventions per flow sheet       []  Discharge due to:_  []  Other:_      Nato Gutiérrez, PTA 12/29/2022  10:34 AM    Future Appointments   Date Time Provider Juan Viramontes   1/3/2023  5:00 PM [facility]   1/6/2023  4:30 PM Chitra Woods, [facility]   1/10/2023  5:00 PM Vitor Comment, [facility]   1/13/2023  4:30 PM Chitra Woods, [facility]   1/17/2023  5:00 PM Vitor Comment, [facility]

## 2023-01-04 ENCOUNTER — HOSPITAL ENCOUNTER (EMERGENCY)
Age: 43
Discharge: HOME | End: 2023-01-04
Attending: EMERGENCY MEDICINE
Payer: MEDICAID

## 2023-01-04 ENCOUNTER — APPOINTMENT (OUTPATIENT)
Dept: GENERAL RADIOLOGY | Age: 43
End: 2023-01-04
Attending: PHYSICIAN ASSISTANT
Payer: MEDICAID

## 2023-01-04 VITALS
RESPIRATION RATE: 16 BRPM | TEMPERATURE: 99.8 F | SYSTOLIC BLOOD PRESSURE: 129 MMHG | HEART RATE: 106 BPM | OXYGEN SATURATION: 97 % | DIASTOLIC BLOOD PRESSURE: 95 MMHG

## 2023-01-04 DIAGNOSIS — U07.1 COVID-19: Primary | ICD-10-CM

## 2023-01-04 LAB
FLUAV RNA SPEC QL NAA+PROBE: NOT DETECTED
FLUBV RNA SPEC QL NAA+PROBE: NOT DETECTED
SARS-COV-2, COV2: DETECTED

## 2023-01-04 PROCEDURE — 87636 SARSCOV2 & INF A&B AMP PRB: CPT

## 2023-01-04 PROCEDURE — 99283 EMERGENCY DEPT VISIT LOW MDM: CPT

## 2023-01-04 PROCEDURE — 71046 X-RAY EXAM CHEST 2 VIEWS: CPT

## 2023-01-04 RX ORDER — BENZONATATE 100 MG/1
100 CAPSULE ORAL
Qty: 30 CAPSULE | Refills: 0 | Status: SHIPPED | OUTPATIENT
Start: 2023-01-04 | End: 2023-01-11

## 2023-01-04 RX ORDER — ACETAMINOPHEN 325 MG/1
650 TABLET ORAL
Qty: 20 TABLET | Refills: 0 | Status: SHIPPED | OUTPATIENT
Start: 2023-01-04

## 2023-01-04 RX ORDER — IBUPROFEN 600 MG/1
600 TABLET ORAL
Qty: 20 TABLET | Refills: 0 | Status: SHIPPED | OUTPATIENT
Start: 2023-01-04

## 2023-01-04 NOTE — ED PROVIDER NOTES
EMERGENCY DEPARTMENT HISTORY AND PHYSICAL EXAM    Date: 1/4/2023  Patient Name: Yelitza Orellana    History of Presenting Illness     Chief Complaint   Patient presents with    Cough    Generalized Body Aches         History Provided By: Patient    Additional History (Context): Yelitza Orellana is a 36 y.o. male with a history of hypertension and diabetes who presents today for issues listed above. Patient reports for the past couple days he has been feeling fatigued, coughing and having body aches. Has not tried thing for this at home. Denies any specific known sick contacts or recent travel. PCP: Bora Rueda MD    Current Outpatient Medications   Medication Sig Dispense Refill    acetaminophen (TYLENOL) 325 mg tablet Take 2 Tablets by mouth every four (4) hours as needed for Pain. 20 Tablet 0    ibuprofen (MOTRIN) 600 mg tablet Take 1 Tablet by mouth every six (6) hours as needed for Pain. 20 Tablet 0    benzonatate (Tessalon Perles) 100 mg capsule Take 1 Capsule by mouth three (3) times daily as needed for Cough for up to 7 days. 30 Capsule 0    glyBURIDE (DIABETA) 5 mg tablet Take 2 Tablets by mouth Daily (before breakfast). 60 Tablet 1    empagliflozin (Jardiance) 25 mg tablet Take 1 Tablet by mouth daily. 30 Tablet 1    atorvastatin (LIPITOR) 80 mg tablet Take 1 Tablet by mouth nightly.  30 Tablet 1    glucose blood VI test strips (OneTouch Verio test strips) strip Check blood glucose BID and as needed Dx: E11.9 (Brand Livongo) 200 Strip 11    glucose blood VI test strips (BLOOD GLUCOSE TEST) strip Check blood glucose BID and as needed Dx: E11.9 200 Strip 11    Blood-Glucose Meter monitoring kit Check blood glucose once daily in the morning Dx: E11.9 1 Kit 0    Lancets misc Check blood glucose once daily in the morning Dx: E11.9 1 Each 11       Past History     Past Medical History:  Past Medical History:   Diagnosis Date    Essential hypertension 7/23/2018    Mixed hyperlipidemia 6/14/2018    Obesity Obstructive sleep apnea 12/5/2018    Type 2 diabetes mellitus ([facility] [address]        Past Surgical History:  No past surgical history on file. Family History:  Family History   Problem Relation Age of Onset    Hypertension Mother     High Cholesterol Mother     Cancer Mother     Diabetes Father     Hypertension Father     High Cholesterol Father        Social History:  Social History     Tobacco Use    Smoking status: Never    Smokeless tobacco: Never   Vaping Use    Vaping Use: Never used   Substance Use Topics    Alcohol use: Yes     Alcohol/week: 4.0 standard drinks     Types: 4 Shots of liquor per week     Comment: rarely    Drug use: Yes     Frequency: 2.0 times per week     Types: Marijuana       Allergies:  No Known Allergies      Review of Systems   Review of Systems   Constitutional:  Positive for fatigue. Negative for chills and fever. HENT:  Negative for congestion, rhinorrhea and sore throat. Respiratory:  Positive for cough. Negative for shortness of breath. Cardiovascular:  Negative for chest pain. Gastrointestinal:  Negative for abdominal pain, blood in stool, constipation, diarrhea, nausea and vomiting. Genitourinary:  Negative for dysuria, frequency and hematuria. Musculoskeletal:  Positive for myalgias. Negative for back pain. Skin:  Negative for rash and wound. Neurological:  Negative for dizziness and headaches. All other systems reviewed and are negative. All Other Systems Negative  Physical Exam     Vitals:    01/04/23 1351   BP: (!) 129/95   Pulse: (!) 106   Resp: 16   Temp: 99.8 °F (37.7 °C)   SpO2: 97%     Physical Exam  Vitals and nursing note reviewed. Constitutional:       General: He is not in acute distress. Appearance: He is well-developed. He is not diaphoretic. Comments: Well-appearing, nontoxic   HENT:      Head: Normocephalic and atraumatic.    Eyes:      Conjunctiva/sclera: Conjunctivae normal.   Cardiovascular:      Rate and Rhythm: Normal rate and regular rhythm. Heart sounds: Normal heart sounds. Pulmonary:      Effort: Pulmonary effort is normal. No respiratory distress. Breath sounds: Normal breath sounds. No stridor, decreased air movement or transmitted upper airway sounds. No decreased breath sounds. Chest:      Chest wall: No tenderness. Abdominal:      General: Bowel sounds are normal. There is no distension. Palpations: Abdomen is soft. Tenderness: There is no abdominal tenderness. There is no guarding or rebound. Musculoskeletal:         General: No deformity. Normal range of motion. Cervical back: Normal range of motion and neck supple. Skin:     General: Skin is warm and dry. Neurological:      Mental Status: He is alert and oriented to person, place, and time. Diagnostic Study Results     Labs -     Recent Results (from the past 12 hour(s))   COVID-19 WITH INFLUENZA A/B    Collection Time: 01/04/23  1:52 PM   Result Value Ref Range    SARS-CoV-2 by PCR Detected (AA) NOTD      Influenza A by PCR Not detected NOTD      Influenza B by PCR Not detected NOTD         Radiologic Studies -   XR CHEST PA LAT   Final Result   :      1. No acute cardiopulmonary disease. CT Results  (Last 48 hours)      None          CXR Results  (Last 48 hours)                 01/04/23 1454  XR CHEST PA LAT Final result    Impression:  :       1.  No acute cardiopulmonary disease. Narrative:  EXAM: XR CHEST PA LAT       HISTORY: cough       COMPARISON: No prior       FINDINGS:       LUNGS: Clear. No pleural fluid. MEDIASTINUM: Unremarkable   BONES/SOFT TISSUES: Unremarkable for age                     Medical Decision Making   I am the first provider for this patient. I reviewed the vital signs, available nursing notes, past medical history, past surgical history, family history and social history. Vital Signs-Reviewed the patient's vital signs.       Records Reviewed: Nursing Notes and Old Medical Records     Procedures: None   Procedures    Provider Notes (Medical Decision Making):     Differential Diagnosis:  influenza, mononucleosis, acute bronchitis, URI, streptococcal pharyngitis, pneumonia, asthma exacerbation, allergic rhinitis, seasonal allergies, COVID    Plan: Discussed positive COVID with patient. Chest x-ray reassuring. Will discharge home with Tylenol, Motrin and antitussives. Have encouraged rest hydration home isolation. Work note given. Will discharge home             MED RECONCILIATION:  No current facility-administered medications for this encounter. Current Outpatient Medications   Medication Sig    acetaminophen (TYLENOL) 325 mg tablet Take 2 Tablets by mouth every four (4) hours as needed for Pain. ibuprofen (MOTRIN) 600 mg tablet Take 1 Tablet by mouth every six (6) hours as needed for Pain. benzonatate (Tessalon Perles) 100 mg capsule Take 1 Capsule by mouth three (3) times daily as needed for Cough for up to 7 days. glyBURIDE (DIABETA) 5 mg tablet Take 2 Tablets by mouth Daily (before breakfast). empagliflozin (Jardiance) 25 mg tablet Take 1 Tablet by mouth daily. atorvastatin (LIPITOR) 80 mg tablet Take 1 Tablet by mouth nightly. glucose blood VI test strips (OneTouch Verio test strips) strip Check blood glucose BID and as needed Dx: E11.9 (Brand Livongo)    glucose blood VI test strips (BLOOD GLUCOSE TEST) strip Check blood glucose BID and as needed Dx: E11.9    Blood-Glucose Meter monitoring kit Check blood glucose once daily in the morning Dx: E11.9    Lancets misc Check blood glucose once daily in the morning Dx: E11.9       Disposition:  Home     DISCHARGE NOTE:   Pt has been reexamined. Patient has no new complaints, changes, or physical findings. Care plan outlined and precautions discussed. Results of workup were reviewed with the patient. All medications were reviewed with the patient. All of pt's questions and concerns were addressed. Patient was instructed and agrees to follow up with PCP as well as to return to the ED upon further deterioration. Patient is ready to go home. Follow-up Information       Follow up With Specialties Details Why Contact Info    [facility] EMERGENCY DEPT Emergency Medicine  As needed [address] [facility]    Marcia Yadav MD Family Medicine Schedule an appointment as soon as possible for a visit   [address] [place] [address]  [address] [facility]  [phone]              Current Discharge Medication List        START taking these medications    Details   acetaminophen (TYLENOL) 325 mg tablet Take 2 Tablets by mouth every four (4) hours as needed for Pain. Qty: 20 Tablet, Refills: 0  Start date: 1/4/2023      ibuprofen (MOTRIN) 600 mg tablet Take 1 Tablet by mouth every six (6) hours as needed for Pain. Qty: 20 Tablet, Refills: 0  Start date: 1/4/2023      benzonatate (Tessalon Perles) 100 mg capsule Take 1 Capsule by mouth three (3) times daily as needed for Cough for up to 7 days. Qty: 30 Capsule, Refills: 0  Start date: 1/4/2023, End date: 1/11/2023                 Diagnosis     Clinical Impression:   1. COVID-19          \"Please note that this dictation was completed with [employer], the computer voice recognition software. Quite often unanticipated grammatical, syntax, homophones, and other interpretive errors are inadvertently transcribed by the computer software. Please disregard these errors. Please excuse any errors that have escaped final proofreading. \"

## 2023-01-04 NOTE — Clinical Note
[address] [place] [address] [address] [facility] EMERGENCY DEPT  [address] [address] [phone]    Work/School Note    Date: 1/4/2023     To Whom It May concern:    Ivan Rubalcava was evaluated by the following provider(s):  Attending Provider: Temi Morley MD  Physician Assistant: Dayami Marquez virus is suspected. Per the CDC guidelines we recommend home isolation until the following conditions are all met:    1. At least five days have passed since symptoms first appeared and/or had a close exposure,   2. After home isolation for five days, wearing a mask around others for the next five days,  3. At least 24 have passed since last fever without the use of fever-reducing medications and  4.  Symptoms (eg cough, shortness of breath) have improved      Sincerely,          Enolia Bamberger, PA
[address] [place] [address] [address] [facility] EMERGENCY DEPT  [address] [address] [phone]    Work/School Note    Date: 1/4/2023     To Whom It May concern:    Ivan Rubalcava was evaluated by the following provider(s):  Attending Provider: Jose Enrique Peters MD  Physician Assistant: Riley Parham virus is suspected. Per the CDC guidelines we recommend home isolation until the following conditions are all met:    1. At least five days have passed since symptoms first appeared and/or had a close exposure,   2. After home isolation for five days, wearing a mask around others for the next five days,  3. At least 24 have passed since last fever without the use of fever-reducing medications and  4.  Symptoms (eg cough, shortness of breath) have improved      Sincerely,          CRUZ Andrea
Residential stability/Engagement in treatment

## 2023-01-06 ENCOUNTER — TELEPHONE (OUTPATIENT)
Dept: PHYSICAL THERAPY | Age: 43
End: 2023-01-06

## 2023-01-10 ENCOUNTER — HOSPITAL ENCOUNTER (OUTPATIENT)
Dept: PHYSICAL THERAPY | Age: 43
Discharge: HOME | End: 2023-01-10
Payer: MEDICAID

## 2023-01-10 PROCEDURE — 97112 NEUROMUSCULAR REEDUCATION: CPT

## 2023-01-10 PROCEDURE — 97530 THERAPEUTIC ACTIVITIES: CPT

## 2023-01-10 PROCEDURE — 97110 THERAPEUTIC EXERCISES: CPT

## 2023-01-10 NOTE — PROGRESS NOTES
PT DAILY TREATMENT NOTE     Patient Name: Apolonia Jeff  Date:1/10/2023  : 1980  [x]  Patient  Verified  Payor: Suraj Saeed / Plan: [address] [place] [address] [id number] / Product Type: Managed Care Medicaid /    In time:5:00  Out time:5:42  Total Treatment Time (min): 42  Visit #: 4 of 8    Treatment Area: Neck pain [M54.2]    SUBJECTIVE  Pain Level (0-10 scale): 10  Any medication changes, allergies to medications, adverse drug reactions, diagnosis change, or new procedure performed?: [x] No    [] Yes (see summary sheet for update)  Subjective functional status/changes:   [] No changes reported  \"I missed a week because I got COVID. Feeling a lot better. \"    OBJECTIVE    Modality rationale: decrease pain and increase tissue extensibility to improve the patients ability to perform ADL's.    Min Type Additional Details    [] Estim:  []Unatt       []IFC  []Premod                        []Other:  []w/ice   []w/heat  Position:  Location:    [] Estim: []Att    []TENS instruct  []NMES                    []Other:  []w/US   []w/ice   []w/heat  Position:  Location:    []  Traction: [] Cervical       []Lumbar                       [] Prone          []Supine                       []Intermittent   []Continuous Lbs:  [] before manual  [] after manual    []  Ultrasound: []Continuous   [] Pulsed                           []1MHz   []3MHz W/cm2:  Location:    []  Iontophoresis with dexamethasone         Location: [] Take home patch   [] In clinic   10 []  Ice     [x]  heat  []  Ice massage  []  Laser   []  Anodyne Position: Seated  Location: C/S    []  Laser with stim  []  Other:  Position:  Location:    []  Vasopneumatic Device    []  Right     []  Left  Pre-treatment girth:  Post-treatment girth:  Measured at (location):  Pressure:       [] lo [] med [] hi   Temperature: [] lo [] med [] hi   [] Skin assessment post-treatment:  []intact []redness- no adverse reaction    []redness - adverse reaction:     10 min Therapeutic Exercise:  [x] See flow sheet :   Rationale: increase ROM and increase strength to improve the patients ability to perform ADL's. 10 min Therapeutic Activity:  [x]  See flow sheet : Wall push ups, QP UE flex and abd. Rationale: increase strength, improve coordination, and increase proprioception  to improve the patients ability to perform work activities. 12 min Neuromuscular Re-education:  [x]  See flow sheet : Prone scap series, deflated ball supine series, table SA punch. Rationale: increase strength, improve coordination, and increase proprioception  to improve the patients ability to perform functional activities. With   [x] TE   [] TA   [] neuro   [] other: Patient Education: [x] Review HEP    [] Progressed/Changed HEP based on:   [] positioning   [] body mechanics   [] transfers   [] heat/ice application    [] other:      Other Objective/Functional Measures: Increased t-band PRE's to green resistance. Added wall push ups 10 reps. Added QP SA, SA with UE flex and abd. Added supine t-band abd and Y. Pain Level (0-10 scale) post treatment: 4/10    ASSESSMENT/Changes in Function: Pt reports waking up ~5x during the night secondary to C/S pain. Pt stated \"I was feeling pretty good until I got COVID, then the neck starting hurting again. \" Challenged with maintaining SA with QP UE series. Occasional cueing for exercise mechanics. Pt was able to perform most exercises without complaints of pain. Slight decrease in pain post-treatment. Continue PT to increase strength/stability and decrease mm/fascia restrictions to improve ease with performing functional activities.     Patient will continue to benefit from skilled PT services to modify and progress therapeutic interventions, address functional mobility deficits, address ROM deficits, address strength deficits, analyze and address soft tissue restrictions, analyze and cue movement patterns, and analyze and modify body mechanics/ergonomics to attain remaining goals. [x]  See Plan of Care  []  See progress note/recertification  []  See Discharge Summary         Progress towards goals / Updated goals:  Short Term Goals: To be accomplished in 2 weeks:               1. Patient will be independent and compliant with HEP 1-2x/day to increase ease with ADls safely. Eval: HEP established  Current: Pt reports HEP compliance. 12/27/2022                2. Patient will improve cervical AROM rotation to 75 deg (B) to increase ease with driving. Eval: cervical rotation AROM: right: 72deg, left: 55deg  Current: AROM C/S Rotation Right 60, Left 70 degrees. 12/29/2022   Long Term Goals: To be accomplished in 4 weeks:               1. Patient will improve FOTO to at least 55 to demonstrate improved function. Eval: FOTO: 68               2. Patient will report sleeping through the night with waking only 1x/night due to discomfort to assist with return to PLOF. Eval: waking due to pain approximately 5x/night   Current: Continues to wake up ~5x a night. 1/10/2023               3. Patient will improve cervical flexion to 40deg with pain no more then 3/10 to increase ease with reading. Eval: cervical AROM flexion: 27deg with 7/10 pain reported. 4. Patient will repot average pain level no more then 3/10 to increase ease with work duties. Eval; Patient reports average pain level is 6/10.      PLAN  []  Upgrade activities as tolerated     [x]  Continue plan of care  []  Update interventions per flow sheet       []  Discharge due to:_  []  Other:_      Yael Chamberlain PTA 1/10/2023  4:56 PM    Future Appointments   Date Time Provider Juan Viramontes   1/10/2023  5:00 PM Gabe Sheldon PTA [facility]   1/13/2023  4:30 PM David Woods, PTA [facility]   1/17/2023  5:00 PM Gabe Sheldon, PTA [facility]

## 2023-01-13 ENCOUNTER — HOSPITAL ENCOUNTER (OUTPATIENT)
Dept: PHYSICAL THERAPY | Age: 43
Discharge: HOME | End: 2023-01-13
Payer: MEDICAID

## 2023-01-13 PROCEDURE — 97112 NEUROMUSCULAR REEDUCATION: CPT

## 2023-01-13 PROCEDURE — 97530 THERAPEUTIC ACTIVITIES: CPT

## 2023-01-13 PROCEDURE — 97110 THERAPEUTIC EXERCISES: CPT

## 2023-01-13 NOTE — PROGRESS NOTES
PT DAILY TREATMENT NOTE     Patient Name: Doc Lynn  Date:2023  : 1980  [x]  Patient  Verified  Payor: Sarah Holcomb / Plan: [address] [place] [id number] / Product Type: Managed Care Medicaid /    In time:430  Out time:508  Total Treatment Time (min): 38  Visit #: 5 of 8    Treatment Area: Neck pain [M54.2]    SUBJECTIVE  Pain Level (0-10 scale): 3/10  Any medication changes, allergies to medications, adverse drug reactions, diagnosis change, or new procedure performed?: [x] No    [] Yes (see summary sheet for update)  Subjective functional status/changes:   [] No changes reported  Pt states his neck feels stiff today. \"My neck is stiff and I just feel really tired today\"    OBJECTIVE    20 min Therapeutic Exercise:  [x] See flow sheet :   Rationale: increase ROM and increase strength to improve the patients ability to perform ADL's    8 min Therapeutic Activity:  [x]  See flow sheet : wall push ups, QP UE flex and abd, open books   Rationale: increase ROM, increase strength, improve coordination, and increase proprioception  to improve the patients ability to perform work activities     10 min Neuromuscular Re-education:  [x]  See flow sheet : scapular re-ed   Rationale: increase strength and improve coordination  to improve the patients ability to perform functional ADL's          With   [] TE   [] TA   [] neuro   [] other: Patient Education: [x] Review HEP    [] Progressed/Changed HEP based on:   [] positioning   [] body mechanics   [] transfers   [] heat/ice application    [] other:      Other Objective/Functional Measures: Added open books     Pain Level (0-10 scale) post treatment: 0/10    ASSESSMENT/Changes in Function: Pt making slow but steady progress towards goals. Pt tolerated exercise progression well without increased pain or discomfort. Pt required verbal cues for proper form and mechanics during exercises. No pain noted post treatment session. Will continue to progress as tolerated to address remaining deficits. Patient will continue to benefit from skilled PT services to modify and progress therapeutic interventions, address functional mobility deficits, address ROM deficits, address strength deficits, and analyze and address soft tissue restrictions to attain remaining goals. []  See Plan of Care  []  See progress note/recertification  []  See Discharge Summary         Progress towards goals / Updated goals:  Short Term Goals: To be accomplished in 2 weeks:               1. Patient will be independent and compliant with HEP 1-2x/day to increase ease with ADls safely. Eval: HEP established  Current: Pt reports HEP compliance. 12/27/2022                2. Patient will improve cervical AROM rotation to 75 deg (B) to increase ease with driving. Eval: cervical rotation AROM: right: 72deg, left: 55deg  Current: AROM C/S Rotation Right 60, Left 70 degrees. 12/29/2022   Long Term Goals: To be accomplished in 4 weeks:               1. Patient will improve FOTO to at least 55 to demonstrate improved function. Eval: FOTO: 68               2. Patient will report sleeping through the night with waking only 1x/night due to discomfort to assist with return to PLOF. Eval: waking due to pain approximately 5x/night              Current: Continues to wake up ~5x a night. 1/10/2023               3. Patient will improve cervical flexion to 40deg with pain no more then 3/10 to increase ease with reading. Eval: cervical AROM flexion: 27deg with 7/10 pain reported. 4. Patient will repot average pain level no more then 3/10 to increase ease with work duties. Eval; Patient reports average pain level is 6/10.     Current: Progressing, 3/10 pain at start of treatment session 1/13/23    PLAN  []  Upgrade activities as tolerated     [x]  Continue plan of care  []  Update interventions per flow sheet       []  Discharge due to:_  []  Other:_      Raysa Iraheta, SHILA 1/13/2023  4:25 PM    Future Appointments   Date Time Provider Juan Viramontes   1[facility]13[facility]2023  4[facility]   1/17/2023  5:00 PM Gregorio Gallegos [facility]

## 2023-01-17 ENCOUNTER — HOSPITAL ENCOUNTER (OUTPATIENT)
Dept: PHYSICAL THERAPY | Age: 43
Discharge: HOME | End: 2023-01-17
Payer: MEDICAID

## 2023-01-17 PROCEDURE — 97530 THERAPEUTIC ACTIVITIES: CPT

## 2023-01-17 PROCEDURE — 97112 NEUROMUSCULAR REEDUCATION: CPT

## 2023-01-17 PROCEDURE — 97110 THERAPEUTIC EXERCISES: CPT

## 2023-01-17 NOTE — PROGRESS NOTES
PT DAILY TREATMENT NOTE     Patient Name: Akbar Vallejo  Date:2023  : 1980  [x]  Patient  Verified  Payor: Augustjuan david Edward / Plan: [address] [place] [address] / Product Type: Managed Care Medicaid /    In time:5:00  Out time:5:40  Total Treatment Time (min): 40  Visit #: 6 of 8    Treatment Area: Neck pain [M54.2]    SUBJECTIVE  Pain Level (0-10 scale): 2/10  Any medication changes, allergies to medications, adverse drug reactions, diagnosis change, or new procedure performed?: [x] No    [] Yes (see summary sheet for update)  Subjective functional status/changes:   [] No changes reported  \"Feeling pretty good. \"    OBJECTIVE    22 min Therapeutic Exercise:  [x] See flow sheet :   Rationale: increase ROM and increase strength to improve the patients ability to perform ADL's.    8 min Therapeutic Activity:  [x]  See flow sheet : wall push ups, QP UE flex and abd, open books. Rationale: increase strength, improve coordination, and increase proprioception  to improve the patients ability to perform work related tasks. 10 min Neuromuscular Re-education:  [x]  See flow sheet : scapular re-ed   Rationale: increase strength, improve coordination, and increase proprioception  to improve the patients ability to perform functional activities. With   [x] TE   [] TA   [] neuro   [] other: Patient Education: [x] Review HEP    [] Progressed/Changed HEP based on:   [] positioning   [] body mechanics   [] transfers   [] heat/ice application    [] other:      Other Objective/Functional Measures: FOTO 74%. AROM C/S flexion 30 degrees without pain. Pt reports no difficulty sleeping, has not woken up due to neck pain in several days. Pt further reports significant improvement in the last few days. Pt requested to attempt a pullup during treatment, stating \"I just want to try 1 pullup. \" Allow pt to attempt a pull up while supervised, pt able to perform 2 pullups without exacerbating symptoms. Pain Level (0-10 scale) post treatment: 0/10    ASSESSMENT/Changes in Function: Pt has made significant improvement towards set goals, met LTG #1, 2, 4 and nearly met LTG #3. D/C to HEP. Pt given green t-band and instructed to perform HEP daily for 2 weeks and then decrease to 2-3x a week. Pt had no further questions or concerns and thanked staff for services. []  See Plan of Care  []  See progress note/recertification  [x]  See Discharge Summary         Progress towards goals / Updated goals:  Short Term Goals: To be accomplished in 2 weeks:               1. Patient will be independent and compliant with HEP 1-2x/day to increase ease with ADls safely. Eval: HEP established  Current: Pt reports HEP compliance. 12/27/2022                2. Patient will improve cervical AROM rotation to 75 deg (B) to increase ease with driving. Eval: cervical rotation AROM: right: 72deg, left: 55deg  Current: AROM C/S Rotation Right 60, Left 70 degrees. 12/29/2022   Long Term Goals: To be accomplished in 4 weeks:               1. Patient will improve FOTO to at least 55 to demonstrate improved function. Eval: FOTO: 68   Current: Met, 74%. 1/17/2023               2. Patient will report sleeping through the night with waking only 1x/night due to discomfort to assist with return to PLOF. Eval: waking due to pain approximately 5x/night              Current: Continues to wake up ~5x a night. 1/10/2023; Pt reports no difficulty sleeping, has not woken up due to neck pain in several days. 1/17/2023               3. Patient will improve cervical flexion to 40deg with pain no more then 3/10 to increase ease with reading. Eval: cervical AROM flexion: 27deg with 7/10 pain reported. Current: Nearly met, 30 degrees without pain. 1/17/2023                4. Patient will repot average pain level no more then 3/10 to increase ease with work duties. Sashaal; Patient reports average pain level is 6/10. Current: Progressing, 3/10 pain at start of treatment session 1/13/23; Met, reports pain level 2/10 at worst in the last few days. 1/17/2023    PLAN  []  Upgrade activities as tolerated     []  Continue plan of care  []  Update interventions per flow sheet       [x]  Discharge due to: Met most LTG's, D/C to HEP. []  Other:_      Tin Phillips, PTA 1/17/2023  5:12 PM    No future appointments.

## 2023-01-17 NOTE — PROGRESS NOTES
[facility]  [address] [facility] [address]  [place], [address]  [phone] fax    Physical Therapy Discharge Summary  Patient name: Domonique Sellers Start of Care: 2022   Referral source: Tenzin Michelle MD : 1980   Medical/Treatment Diagnosis: Neck pain [M54.2]  Payor: [employer] / Plan: 1 [facility] / Product Type: Managed Care Medicaid /  Onset Date:2022     Prior Hospitalization: see medical history Provider#: [id number]   Medications: Verified on Patient Summary List    Comorbidities: BMI over 30, DM, HA, Hearing impairment, HTN  Prior Level of Function:Patient is right hand dominant. Patient reports prior to the accident no functional deficits in neck. Visits from Start of Care: 6    Missed Visits: 2  Reporting Period : 2022 to 2023      Summary of Care:    FOTO 74%. AROM C/S flexion 30 degrees without pain. Pt reports no difficulty sleeping, has not woken up due to neck pain in several days. Pt further reports significant improvement in the last few days. Pt requested to attempt a pullup during treatment, stating \"I just want to try 1 pullup. \" Allow pt to attempt a pull up while supervised, pt able to perform 2 pullups without exacerbating symptoms. Short Term Goals: To be accomplished in 2 weeks:               1. Patient will be independent and compliant with HEP 1-2x/day to increase ease with ADls safely. Eval: HEP established  Current: Pt reports HEP compliance. 2. Patient will improve cervical AROM rotation to 75 deg (B) to increase ease with driving. Eval: cervical rotation AROM: right: 72deg, left: 55deg  Current: AROM C/S Rotation Right 60, Left 70 degrees. Long Term Goals: To be accomplished in 4 weeks:               1. Patient will improve FOTO to at least 55 to demonstrate improved function.                Eval: FOTO: 68 Current: Met, 74%               2. Patient will report sleeping through the night with waking only 1x/night due to discomfort to assist with return to PLOF. Eval: waking due to pain approximately 5x/night              Current: Pt reports no difficulty sleeping, has not woken up due to neck pain in several days. 3. Patient will improve cervical flexion to 40deg with pain no more then 3/10 to increase ease with reading. Eval: cervical AROM flexion: 27deg with 7/10 pain reported. Current: Nearly met, 30 degrees without pain. 4. Patient will repot average pain level no more then 3/10 to increase ease with work duties. Eval; Patient reports average pain level is 6/10. Current: Met, reports pain level 2/10 at worst in the last few days. ASSESSMENT/RECOMMENDATIONS:  Pt has made significant improvement towards set goals, met LTG #1, 2, 4 and nearly met LTG #3. D/C to HEP. Pt given green t-band and instructed to perform HEP daily for 2 weeks and then decrease to 2-3x a week. Pt had no further questions or concerns and thanked staff for services.   [x]Discontinue therapy: [x]Patient has reached or is progressing toward set goals      []Patient is non-compliant or has abdicated      []Due to lack of appreciable progress towards set goals    Aj Toledo, SHILA 1/17/2023 5:44 PM

## 2023-02-15 NOTE — PROGRESS NOTES
1. \"Have you been to the ER, urgent care clinic since your last visit? Hospitalized since your last visit? \" No    2. \"Have you seen or consulted any other health care providers outside of the [address] [place] [address] since your last visit? \" No     3. For patients aged 39-70: Has the patient had a colonoscopy / FIT/ Cologuard? NA - based on age      If the patient is female:    4. For patients aged 41-77: Has the patient had a mammogram within the past 2 years? NA - based on age or sex      11. For patients aged 21-65: Has the patient had a pap smear?  NA - based on age or sex no back pain, no gout, no musculoskeletal pain, no neck pain, and no weakness.

## 2023-04-19 ENCOUNTER — TELEPHONE (OUTPATIENT)
Age: 43
End: 2023-04-19

## 2023-04-19 RX ORDER — BLOOD SUGAR DIAGNOSTIC
STRIP MISCELLANEOUS
Qty: 50 STRIP | Refills: 11 | Status: SHIPPED | OUTPATIENT
Start: 2023-04-19

## 2023-04-19 NOTE — TELEPHONE ENCOUNTER
Fax received from [address] [place] [address] meds stating prior Shazia Lazar is required for the Jardiance 25MG Tablets . Submit a PA request  1. Go to key. LISNR and click \"Enter a Key\"  2. Patient last name: Saud      : 1980      Key: [id number]  3.  Click \"start a PA\", complete the form, and \"send to plan\"

## 2023-04-21 NOTE — TELEPHONE ENCOUNTER
Received prior authorization approval for medication [facility] notified (via fax [phone]),[employer] effective 03/22/2023 to 04/20/2024

## 2023-05-04 ENCOUNTER — TELEPHONE (OUTPATIENT)
Age: 43
End: 2023-05-04

## 2023-05-04 DIAGNOSIS — K62.5 BRBPR (BRIGHT RED BLOOD PER RECTUM): Primary | ICD-10-CM

## 2023-05-04 NOTE — TELEPHONE ENCOUNTER
Patient needs to schedule visit with GI to discuss early colon cancer screening due to this.    Placed referral.

## 2023-05-04 NOTE — TELEPHONE ENCOUNTER
Jelani advised that Dr. Raman Nava wants him to schedule visit with GI to discuss early colon cancer screening due to this and that a referral has been place. Tried giving Jelani the number to call \"I'm driving right now. If patient call back [phone].

## 2023-05-04 NOTE — TELEPHONE ENCOUNTER
Patient called stating he had some blood after using the bathroom for #2. It was not a whole lot of blood and it was bright red. Patient states he has never had that happen before and that he was not having a hard time using the bathroom. He is unsure what to do.

## 2023-08-28 NOTE — PROGRESS NOTES
Chief Complaint   Patient presents with    Diabetes     Elevated blood sugars 256 this am    Hypertension    Sleep Apnea     No CPAP use       1. \"Have you been to the ER, urgent care clinic since your last visit? Hospitalized since your last visit? \" Yes multiple ED visit dating from 01/04/2023 to 01/28/2023 for COVID 19, otalgia of right ear and possible hearing loss    2. \"Have you seen or consulted any other health care providers outside of the [address] [place] [address] since your last visit? \" No     3. For patients aged 43-73: Has the patient had a colonoscopy / FIT/ Cologuard?  NA - based on age

## 2023-08-28 NOTE — PATIENT INSTRUCTIONS
carbohydrates  Your daily amount depends on several things, such as your weight, how active you are, which diabetes medicines you take, and what your goals are for your blood sugar levels. A registered dietitian or diabetes educator can help you plan how many carbs to include in each meal and snack. For most adults, a guideline for the daily amount of carbs is:  45 to 60 grams at each meal. That's about the same as 3 to 4 carbohydrate servings. 15 to 20 grams at each snack. That's about the same as 1 carbohydrate serving. Count carbs  Counting carbs lets you know how much rapid-acting insulin to take before you eat. If you use an insulin pump, you get a constant rate of insulin during the day. So the pump must be programmed at meals. This gives you extra insulin to cover the rise in blood sugar after meals. If you take insulin:  Learn your own insulin-to-carb ratio. You and your diabetes health professional will figure out the ratio. You can do this by testing your blood sugar after meals. For example, you may need a certain amount of insulin for every 15 grams of carbs. Add up the carb grams in a meal. Then you can figure out how many units of insulin to take based on your insulin-to-carb ratio. Exercise lowers blood sugar. You can use less insulin than you would if you were not doing exercise. Keep in mind that timing matters. If you exercise within 1 hour after a meal, your body may need less insulin for that meal than it would if you exercised 3 hours after the meal. Test your blood sugar to find out how exercise affects your need for insulin. If you do or don't take insulin:  Look at labels on packaged foods. This can tell you how many carbs are in a serving. Be aware of portions, or serving sizes. If a package has two servings and you eat the whole package, you need to double the number of grams of carbohydrate listed for one serving.   Protein, fat, and fiber do not raise blood sugar as much as carbs

## 2023-08-29 ENCOUNTER — OFFICE VISIT (OUTPATIENT)
Age: 43
End: 2023-08-29
Payer: MEDICAID

## 2023-08-29 ENCOUNTER — TELEPHONE (OUTPATIENT)
Age: 43
End: 2023-08-29

## 2023-08-29 VITALS
HEART RATE: 78 BPM | DIASTOLIC BLOOD PRESSURE: 84 MMHG | TEMPERATURE: 98 F | OXYGEN SATURATION: 98 % | HEIGHT: 70 IN | RESPIRATION RATE: 18 BRPM | WEIGHT: 209 LBS | SYSTOLIC BLOOD PRESSURE: 126 MMHG | BODY MASS INDEX: 29.92 KG/M2

## 2023-08-29 DIAGNOSIS — Z91.199 MEDICALLY NONCOMPLIANT: ICD-10-CM

## 2023-08-29 DIAGNOSIS — E11.9 TYPE 2 DIABETES MELLITUS WITHOUT COMPLICATION, WITHOUT LONG-TERM CURRENT USE OF INSULIN (HCC): Primary | ICD-10-CM

## 2023-08-29 DIAGNOSIS — I10 ESSENTIAL (PRIMARY) HYPERTENSION: ICD-10-CM

## 2023-08-29 DIAGNOSIS — E78.2 MIXED HYPERLIPIDEMIA: ICD-10-CM

## 2023-08-29 PROCEDURE — 3079F DIAST BP 80-89 MM HG: CPT | Performed by: FAMILY MEDICINE

## 2023-08-29 PROCEDURE — 99214 OFFICE O/P EST MOD 30 MIN: CPT | Performed by: FAMILY MEDICINE

## 2023-08-29 PROCEDURE — 3074F SYST BP LT 130 MM HG: CPT | Performed by: FAMILY MEDICINE

## 2023-08-29 RX ORDER — ATORVASTATIN CALCIUM 80 MG/1
80 TABLET, FILM COATED ORAL
Qty: 30 TABLET | Refills: 1 | Status: SHIPPED | OUTPATIENT
Start: 2023-08-29

## 2023-08-29 RX ORDER — GLYBURIDE 5 MG/1
10 TABLET ORAL
Qty: 60 TABLET | Refills: 1 | Status: SHIPPED | OUTPATIENT
Start: 2023-08-29

## 2023-08-29 ASSESSMENT — PATIENT HEALTH QUESTIONNAIRE - PHQ9
2. FEELING DOWN, DEPRESSED OR HOPELESS: 0
3. TROUBLE FALLING OR STAYING ASLEEP: 0
5. POOR APPETITE OR OVEREATING: 0
8. MOVING OR SPEAKING SO SLOWLY THAT OTHER PEOPLE COULD HAVE NOTICED. OR THE OPPOSITE, BEING SO FIGETY OR RESTLESS THAT YOU HAVE BEEN MOVING AROUND A LOT MORE THAN USUAL: 0
9. THOUGHTS THAT YOU WOULD BE BETTER OFF DEAD, OR OF HURTING YOURSELF: 0
SUM OF ALL RESPONSES TO PHQ QUESTIONS 1-9: 0
7. TROUBLE CONCENTRATING ON THINGS, SUCH AS READING THE NEWSPAPER OR WATCHING TELEVISION: 0
10. IF YOU CHECKED OFF ANY PROBLEMS, HOW DIFFICULT HAVE THESE PROBLEMS MADE IT FOR YOU TO DO YOUR WORK, TAKE CARE OF THINGS AT HOME, OR GET ALONG WITH OTHER PEOPLE: 0
4. FEELING TIRED OR HAVING LITTLE ENERGY: 0
SUM OF ALL RESPONSES TO PHQ QUESTIONS 1-9: 0
1. LITTLE INTEREST OR PLEASURE IN DOING THINGS: 0
6. FEELING BAD ABOUT YOURSELF - OR THAT YOU ARE A FAILURE OR HAVE LET YOURSELF OR YOUR FAMILY DOWN: 0
SUM OF ALL RESPONSES TO PHQ9 QUESTIONS 1 & 2: 0

## 2023-08-29 NOTE — TELEPHONE ENCOUNTER
Patient called and stated that he is unable to retreive this medication empagliflozin (JARDIANCE) 25 MG tablet    because his insurance does not cover it, the medication is $54, Young Dameon Mindy would like to know what medication he can get in replace of this medication.  Please advise

## 2023-08-29 NOTE — TELEPHONE ENCOUNTER
Per insurance company medication does not require a prior authorization and that Renettavidal Portillo will just has a out of pocket higher cost. Please advise

## 2023-08-30 NOTE — TELEPHONE ENCOUNTER
Spoke with Yulissa Thomason to advise that his insurance does not cover medication and his cost is gone to be higher. Advised him to come and  Jardiance savings card to activate . Per Yulissa Thomason he will  on Thursday.

## 2023-10-26 PROBLEM — D72.829 ELEVATED WHITE BLOOD CELL COUNT, UNSPECIFIED: Status: ACTIVE | Noted: 2023-10-26

## 2023-10-26 NOTE — PROGRESS NOTES
Chief Complaint   Patient presents with    Cholesterol Problem    Diabetes    Hypertension    Constipation      1. \"Have you been to the ER, urgent care clinic since your last visit? Hospitalized since your last visit? \" Yes 09/22/2023 [facility] for rectal bleeding / blood in stool    2. \"Have you seen or consulted any other health care providers outside of the [address] [place] [address] since your last visit? \" No     3. For patients aged 43-73: Has the patient had a colonoscopy / FIT/ Cologuard? NA - based on age    Annual eye exam: 01/04/2021 - Overdue - patient is aware  Pneumococcal vaccine: 03/16/2019  Flu vaccine: 10/12/2022  Patient instructed to remove shoes: Yes        Physician order obtained. Patient completed adult immunization consent form. Allergies, contraindications and recommendations reviewed with patient. Seasonal influenza vaccine administered IM left deltoid. Patient tolerated well. Patient remained in office for 15 minutes after injection and no adverse reactions were noted.      Lot # [id number]  Exp Date: 06/30/2024  [address] [place] [address] # [id number]

## 2023-10-31 ENCOUNTER — OFFICE VISIT (OUTPATIENT)
Age: 43
End: 2023-10-31
Payer: COMMERCIAL

## 2023-10-31 VITALS
BODY MASS INDEX: 31.4 KG/M2 | RESPIRATION RATE: 18 BRPM | HEART RATE: 72 BPM | OXYGEN SATURATION: 98 % | WEIGHT: 212 LBS | HEIGHT: 69 IN | SYSTOLIC BLOOD PRESSURE: 136 MMHG | TEMPERATURE: 98 F | DIASTOLIC BLOOD PRESSURE: 84 MMHG

## 2023-10-31 DIAGNOSIS — E11.9 TYPE 2 DIABETES MELLITUS WITHOUT COMPLICATION, WITHOUT LONG-TERM CURRENT USE OF INSULIN (HCC): Primary | ICD-10-CM

## 2023-10-31 DIAGNOSIS — Z23 ENCOUNTER FOR IMMUNIZATION: ICD-10-CM

## 2023-10-31 DIAGNOSIS — I10 ESSENTIAL (PRIMARY) HYPERTENSION: ICD-10-CM

## 2023-10-31 DIAGNOSIS — E78.2 MIXED HYPERLIPIDEMIA: ICD-10-CM

## 2023-10-31 DIAGNOSIS — Z91.199 MEDICALLY NONCOMPLIANT: ICD-10-CM

## 2023-10-31 LAB — HBA1C MFR BLD: 8.8 %

## 2023-10-31 PROCEDURE — 90674 CCIIV4 VAC NO PRSV 0.5 ML IM: CPT | Performed by: FAMILY MEDICINE

## 2023-10-31 PROCEDURE — 83036 HEMOGLOBIN GLYCOSYLATED A1C: CPT | Performed by: FAMILY MEDICINE

## 2023-10-31 PROCEDURE — 99214 OFFICE O/P EST MOD 30 MIN: CPT | Performed by: FAMILY MEDICINE

## 2023-10-31 PROCEDURE — 3075F SYST BP GE 130 - 139MM HG: CPT | Performed by: FAMILY MEDICINE

## 2023-10-31 PROCEDURE — 90471 IMMUNIZATION ADMIN: CPT | Performed by: FAMILY MEDICINE

## 2023-10-31 PROCEDURE — 3079F DIAST BP 80-89 MM HG: CPT | Performed by: FAMILY MEDICINE

## 2023-10-31 RX ORDER — LOSARTAN POTASSIUM 50 MG/1
50 TABLET ORAL DAILY
Qty: 30 TABLET | Refills: 3 | Status: SHIPPED | OUTPATIENT
Start: 2023-10-31

## 2023-10-31 RX ORDER — ATORVASTATIN CALCIUM 80 MG/1
80 TABLET, FILM COATED ORAL
Qty: 30 TABLET | Refills: 3 | Status: SHIPPED | OUTPATIENT
Start: 2023-10-31

## 2023-10-31 RX ORDER — GLYBURIDE 5 MG/1
20 TABLET ORAL
Qty: 120 TABLET | Refills: 3 | Status: SHIPPED | OUTPATIENT
Start: 2023-10-31

## 2024-01-30 NOTE — PATIENT INSTRUCTIONS
at each meal. That's about the same as 3 to 4 carbohydrate servings.  15 to 20 grams at each snack. That's about the same as 1 carbohydrate serving.  Count carbs  Counting carbs lets you know how much rapid-acting insulin to take before you eat. If you use an insulin pump, you get a constant rate of insulin during the day. So the pump must be programmed at meals. This gives you extra insulin to cover the rise in blood sugar after meals.  If you take insulin:  Learn your own insulin-to-carb ratio. You and your diabetes health professional will figure out the ratio. You can do this by testing your blood sugar after meals. For example, you may need a certain amount of insulin for every 15 grams of carbs.  Add up the carb grams in a meal. Then you can figure out how many units of insulin to take based on your insulin-to-carb ratio.  Exercise lowers blood sugar. You can use less insulin than you would if you were not doing exercise. Keep in mind that timing matters. If you exercise within 1 hour after a meal, your body may need less insulin for that meal than it would if you exercised 3 hours after the meal. Test your blood sugar to find out how exercise affects your need for insulin.  If you do or don't take insulin:  Look at labels on packaged foods. This can tell you how many carbs are in a serving.  Be aware of portions, or serving sizes. If a package has two servings and you eat the whole package, you need to double the number of grams of carbohydrate listed for one serving.  Protein, fat, and fiber do not raise blood sugar as much as carbs do. If you eat a lot of these nutrients in a meal, your blood sugar will rise more slowly than it would otherwise.  Where can you learn more?  Go to https://www.[employer].net/patientEd and enter G703 to learn more about \"Counting Carbohydrates for Diabetes: Care Instructions.\"  Current as of: September 20, 2023               Content Version: 13.9  © 6747-3504 [employer]wise,

## 2024-01-30 NOTE — PROGRESS NOTES
Chief Complaint   Patient presents with    Cholesterol Problem    Diabetes    Hypertension      1. \"Have you been to the ER, urgent care clinic since your last visit?  Hospitalized since your last visit?\" Yes 12/11/2023 [facility] ED for left foot imjury    2. \"Have you seen or consulted any other health care providers outside of the [facility] since your last visit?\" No     3. For patients aged 45-75: Has the patient had a colonoscopy / FIT/ Cologuard? NA - based on age

## 2024-01-31 ENCOUNTER — OFFICE VISIT (OUTPATIENT)
Age: 44
End: 2024-01-31
Payer: COMMERCIAL

## 2024-01-31 VITALS
BODY MASS INDEX: 31.07 KG/M2 | SYSTOLIC BLOOD PRESSURE: 132 MMHG | RESPIRATION RATE: 18 BRPM | OXYGEN SATURATION: 99 % | HEIGHT: 70 IN | WEIGHT: 217 LBS | DIASTOLIC BLOOD PRESSURE: 74 MMHG | TEMPERATURE: 98.5 F | HEART RATE: 103 BPM

## 2024-01-31 DIAGNOSIS — Z91.199 MEDICALLY NONCOMPLIANT: ICD-10-CM

## 2024-01-31 DIAGNOSIS — I10 ESSENTIAL (PRIMARY) HYPERTENSION: ICD-10-CM

## 2024-01-31 DIAGNOSIS — E78.2 MIXED HYPERLIPIDEMIA: ICD-10-CM

## 2024-01-31 DIAGNOSIS — E11.29 TYPE 2 DIABETES MELLITUS WITH MICROALBUMINURIA, WITHOUT LONG-TERM CURRENT USE OF INSULIN (HCC): Primary | ICD-10-CM

## 2024-01-31 DIAGNOSIS — R80.9 TYPE 2 DIABETES MELLITUS WITH MICROALBUMINURIA, WITHOUT LONG-TERM CURRENT USE OF INSULIN (HCC): Primary | ICD-10-CM

## 2024-01-31 LAB — HBA1C MFR BLD: 9 %

## 2024-01-31 PROCEDURE — 83036 HEMOGLOBIN GLYCOSYLATED A1C: CPT | Performed by: FAMILY MEDICINE

## 2024-01-31 PROCEDURE — 3075F SYST BP GE 130 - 139MM HG: CPT | Performed by: FAMILY MEDICINE

## 2024-01-31 PROCEDURE — 99214 OFFICE O/P EST MOD 30 MIN: CPT | Performed by: FAMILY MEDICINE

## 2024-01-31 PROCEDURE — 3078F DIAST BP <80 MM HG: CPT | Performed by: FAMILY MEDICINE

## 2024-01-31 RX ORDER — ATORVASTATIN CALCIUM 80 MG/1
80 TABLET, FILM COATED ORAL
Qty: 30 TABLET | Refills: 3 | Status: SHIPPED | OUTPATIENT
Start: 2024-01-31 | End: 2024-01-31 | Stop reason: SDUPTHER

## 2024-01-31 RX ORDER — GLYBURIDE 5 MG/1
20 TABLET ORAL
Qty: 120 TABLET | Refills: 3 | Status: SHIPPED | OUTPATIENT
Start: 2024-01-31

## 2024-01-31 RX ORDER — GLYBURIDE 5 MG/1
20 TABLET ORAL
Qty: 120 TABLET | Refills: 3 | Status: SHIPPED | OUTPATIENT
Start: 2024-01-31 | End: 2024-01-31 | Stop reason: SDUPTHER

## 2024-01-31 RX ORDER — ATORVASTATIN CALCIUM 80 MG/1
80 TABLET, FILM COATED ORAL
Qty: 30 TABLET | Refills: 3 | Status: SHIPPED | OUTPATIENT
Start: 2024-01-31

## 2024-01-31 RX ORDER — LOSARTAN POTASSIUM 50 MG/1
50 TABLET ORAL DAILY
Qty: 30 TABLET | Refills: 3 | Status: SHIPPED | OUTPATIENT
Start: 2024-01-31 | End: 2024-01-31 | Stop reason: SDUPTHER

## 2024-01-31 RX ORDER — DULAGLUTIDE 0.75 MG/.5ML
0.75 INJECTION, SOLUTION SUBCUTANEOUS WEEKLY
Qty: 4 ADJUSTABLE DOSE PRE-FILLED PEN SYRINGE | Refills: 1 | Status: SHIPPED | OUTPATIENT
Start: 2024-01-31

## 2024-01-31 RX ORDER — LOSARTAN POTASSIUM 50 MG/1
50 TABLET ORAL DAILY
Qty: 30 TABLET | Refills: 3 | Status: SHIPPED | OUTPATIENT
Start: 2024-01-31

## 2024-01-31 SDOH — ECONOMIC STABILITY: FOOD INSECURITY: WITHIN THE PAST 12 MONTHS, THE FOOD YOU BOUGHT JUST DIDN'T LAST AND YOU DIDN'T HAVE MONEY TO GET MORE.: NEVER TRUE

## 2024-01-31 SDOH — ECONOMIC STABILITY: HOUSING INSECURITY
IN THE LAST 12 MONTHS, WAS THERE A TIME WHEN YOU DID NOT HAVE A STEADY PLACE TO SLEEP OR SLEPT IN A SHELTER (INCLUDING NOW)?: NO

## 2024-01-31 SDOH — ECONOMIC STABILITY: INCOME INSECURITY: HOW HARD IS IT FOR YOU TO PAY FOR THE VERY BASICS LIKE FOOD, HOUSING, MEDICAL CARE, AND HEATING?: NOT VERY HARD

## 2024-01-31 SDOH — ECONOMIC STABILITY: FOOD INSECURITY: WITHIN THE PAST 12 MONTHS, YOU WORRIED THAT YOUR FOOD WOULD RUN OUT BEFORE YOU GOT MONEY TO BUY MORE.: NEVER TRUE

## 2024-01-31 ASSESSMENT — ANXIETY QUESTIONNAIRES
7. FEELING AFRAID AS IF SOMETHING AWFUL MIGHT HAPPEN: 0
5. BEING SO RESTLESS THAT IT IS HARD TO SIT STILL: 0
IF YOU CHECKED OFF ANY PROBLEMS ON THIS QUESTIONNAIRE, HOW DIFFICULT HAVE THESE PROBLEMS MADE IT FOR YOU TO DO YOUR WORK, TAKE CARE OF THINGS AT HOME, OR GET ALONG WITH OTHER PEOPLE: NOT DIFFICULT AT ALL
2. NOT BEING ABLE TO STOP OR CONTROL WORRYING: 0
3. WORRYING TOO MUCH ABOUT DIFFERENT THINGS: 0
6. BECOMING EASILY ANNOYED OR IRRITABLE: 0
GAD7 TOTAL SCORE: 0
1. FEELING NERVOUS, ANXIOUS, OR ON EDGE: 0
4. TROUBLE RELAXING: 0

## 2024-01-31 ASSESSMENT — PATIENT HEALTH QUESTIONNAIRE - PHQ9
SUM OF ALL RESPONSES TO PHQ QUESTIONS 1-9: 0
SUM OF ALL RESPONSES TO PHQ QUESTIONS 1-9: 0
SUM OF ALL RESPONSES TO PHQ9 QUESTIONS 1 & 2: 0
SUM OF ALL RESPONSES TO PHQ QUESTIONS 1-9: 0
SUM OF ALL RESPONSES TO PHQ QUESTIONS 1-9: 0
2. FEELING DOWN, DEPRESSED OR HOPELESS: 0
1. LITTLE INTEREST OR PLEASURE IN DOING THINGS: 0

## 2024-01-31 NOTE — PROGRESS NOTES
Chief Complaint   Patient presents with    Cholesterol Problem    Diabetes    Hypertension     SUBJECTIVE    Diabetes -  Patient presents for follow-up of diabetes.  He is reporting taking his meds but he cannot med rec.  No side effects reported. His friend who accompanied him last time is here today to support him.  She does seem skeptical that he is following all of the advice we are giving him on lifestyle changes.    Says his father had several diabetic complications to include dialysis and amputations.     Hyperlipidemia -   Currently reports that he is taking statin.  No side effects reported.    OBJECTIVE  Blood pressure 132/74, pulse (!) 103, temperature 98.5 °F (36.9 °C), temperature source Temporal, resp. rate 18, height 1.778 m (5' 10\"), weight 98.4 kg (217 lb), SpO2 99 %.      General:  Alert, cooperative, well appearing, in no apparent distress.  Chest: clear, no w/r/r.  Heart:  normal S1S2, RRR, no murmurs.    Results for orders placed or performed in visit on 01/31/24   AMB POC HEMOGLOBIN A1C   Result Value Ref Range    Hemoglobin A1C, POC 9.0 %     ASSESSMENT / PLAN   Diagnosis Orders   1. Type 2 diabetes mellitus with microalbuminuria, without long-term current use of insulin (HCC)  AMB POC HEMOGLOBIN A1C    Hemoglobin A1C    Comprehensive Metabolic Panel      2. Essential (primary) hypertension        3. Mixed hyperlipidemia        4. Medically noncompliant          Uncontrolled type 2 diabetes mellitus with microalbuminuria (HCC)   - advised on consistency with Jardiance and glyburide.  Advised on proper dosing as he thinks he was doubling up on his jardiance.   - start trulicity 0.75mg weekly trial again.  He is open-minded to avoid daily insulin use.  - Work on diet & exercise   - Check A1c in 2 months.  - cont losartan 50mg daily.    HTN   -cont losartan 50mg daily.    Mixed hyperlipidemia  - Poorly controlled due to noncompliance in the past  - encourage daily Lipitor 80mg nightly.    Flu

## 2024-03-04 ENCOUNTER — OFFICE VISIT (OUTPATIENT)
Age: 44
End: 2024-03-04
Payer: COMMERCIAL

## 2024-03-04 ENCOUNTER — HOSPITAL ENCOUNTER (OUTPATIENT)
Facility: HOSPITAL | Age: 44
Discharge: HOME | End: 2024-03-07
Payer: COMMERCIAL

## 2024-03-04 VITALS
BODY MASS INDEX: 31.99 KG/M2 | SYSTOLIC BLOOD PRESSURE: 130 MMHG | WEIGHT: 216 LBS | DIASTOLIC BLOOD PRESSURE: 78 MMHG | OXYGEN SATURATION: 98 % | HEIGHT: 69 IN | TEMPERATURE: 98 F | HEART RATE: 98 BPM | RESPIRATION RATE: 18 BRPM

## 2024-03-04 DIAGNOSIS — M25.521 RIGHT ELBOW PAIN: ICD-10-CM

## 2024-03-04 DIAGNOSIS — R80.9 TYPE 2 DIABETES MELLITUS WITH MICROALBUMINURIA, WITHOUT LONG-TERM CURRENT USE OF INSULIN (HCC): ICD-10-CM

## 2024-03-04 DIAGNOSIS — M25.461 EFFUSION OF RIGHT KNEE: ICD-10-CM

## 2024-03-04 DIAGNOSIS — M25.461 EFFUSION OF RIGHT KNEE: Primary | ICD-10-CM

## 2024-03-04 DIAGNOSIS — E11.29 TYPE 2 DIABETES MELLITUS WITH MICROALBUMINURIA, WITHOUT LONG-TERM CURRENT USE OF INSULIN (HCC): ICD-10-CM

## 2024-03-04 DIAGNOSIS — M54.2 NECK PAIN: ICD-10-CM

## 2024-03-04 LAB
ALBUMIN SERPL-MCNC: 4.3 G/DL (ref 3.4–5)
ALBUMIN/GLOB SERPL: 1.2 (ref 0.8–1.7)
ALP SERPL-CCNC: 83 U/L (ref 45–117)
ALT SERPL-CCNC: 62 U/L (ref 16–61)
ANION GAP SERPL CALC-SCNC: 4 MMOL/L (ref 3–18)
AST SERPL-CCNC: 22 U/L (ref 10–38)
BILIRUB SERPL-MCNC: 1.1 MG/DL (ref 0.2–1)
BUN SERPL-MCNC: 12 MG/DL (ref 7–18)
BUN/CREAT SERPL: 9 (ref 12–20)
CALCIUM SERPL-MCNC: 10.3 MG/DL (ref 8.5–10.1)
CHLORIDE SERPL-SCNC: 107 MMOL/L (ref 100–111)
CO2 SERPL-SCNC: 30 MMOL/L (ref 21–32)
CREAT SERPL-MCNC: 1.27 MG/DL (ref 0.6–1.3)
EST. AVERAGE GLUCOSE BLD GHB EST-MCNC: 183 MG/DL
GLOBULIN SER CALC-MCNC: 3.5 G/DL (ref 2–4)
GLUCOSE SERPL-MCNC: 81 MG/DL (ref 74–99)
HBA1C MFR BLD: 8 % (ref 4.2–5.6)
POTASSIUM SERPL-SCNC: 4.6 MMOL/L (ref 3.5–5.5)
PROT SERPL-MCNC: 7.8 G/DL (ref 6.4–8.2)
SODIUM SERPL-SCNC: 141 MMOL/L (ref 136–145)

## 2024-03-04 PROCEDURE — 73080 X-RAY EXAM OF ELBOW: CPT

## 2024-03-04 PROCEDURE — 36415 COLL VENOUS BLD VENIPUNCTURE: CPT

## 2024-03-04 PROCEDURE — 83036 HEMOGLOBIN GLYCOSYLATED A1C: CPT

## 2024-03-04 PROCEDURE — 3075F SYST BP GE 130 - 139MM HG: CPT | Performed by: FAMILY MEDICINE

## 2024-03-04 PROCEDURE — 80053 COMPREHEN METABOLIC PANEL: CPT

## 2024-03-04 PROCEDURE — 3078F DIAST BP <80 MM HG: CPT | Performed by: FAMILY MEDICINE

## 2024-03-04 PROCEDURE — 73562 X-RAY EXAM OF KNEE 3: CPT

## 2024-03-04 PROCEDURE — 99213 OFFICE O/P EST LOW 20 MIN: CPT | Performed by: FAMILY MEDICINE

## 2024-03-04 ASSESSMENT — PATIENT HEALTH QUESTIONNAIRE - PHQ9
SUM OF ALL RESPONSES TO PHQ QUESTIONS 1-9: 0
1. LITTLE INTEREST OR PLEASURE IN DOING THINGS: 0
2. FEELING DOWN, DEPRESSED OR HOPELESS: 0
SUM OF ALL RESPONSES TO PHQ QUESTIONS 1-9: 0
SUM OF ALL RESPONSES TO PHQ9 QUESTIONS 1 & 2: 0

## 2024-03-04 NOTE — PATIENT INSTRUCTIONS
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
## 2024-03-04 NOTE — PROGRESS NOTES
\"Have you been to the ER, urgent care clinic since your last visit?  Hospitalized since your last visit?\"    YES - When: approximately 2 days ago.  Where and Why: [facility] ED .    “Have you seen or consulted any other health care providers outside of [facility] since your last visit?”    NO

## 2024-04-10 ENCOUNTER — TELEPHONE (OUTPATIENT)
Age: 44
End: 2024-04-10

## 2024-04-10 NOTE — TELEPHONE ENCOUNTER
Fax received from [employer] stating prior auth is required for the Jardiance 25 mg tablets.  Submit a PA request  1. Go to [email or web address] and click \"Enter a Key\"  2. Patient last name: Saud      : 1980      Key: [id number]  3. Click \"start a PA\", complete the form, and \"send to plan\"

## 2024-04-12 NOTE — TELEPHONE ENCOUNTER
This request has received an approval. View the bottom of the request for an electronic copy of the approval letter. Patient notified.

## 2024-05-28 NOTE — TELEPHONE ENCOUNTER
This patient contacted office for the following prescriptions to be filled:    Medication requested : Dulaglutide (TRULICITY) 0.75 MG/0.5ML SOPN QTY  4 Adjustable Dose Pre-filled Pen Syringe Refill 2  atorvastatin (LIPITOR) 80 MG tablet QTY 90  empagliflozin (JARDIANCE) 25 MG tablet QTY 90  losartan (COZAAR) 50 MG tablet QTY 90  glyBURIDE (DIABETA) 5 MG tablet    PCP: Glenna  Pharmacy or Print: [facility]   Mail order or Local pharmacy [facility]     Scheduled appointment if not seen by current providers in office: LOV 4/3/2024 JACKY 7/3/2024

## 2024-05-31 RX ORDER — LOSARTAN POTASSIUM 50 MG/1
50 TABLET ORAL DAILY
Qty: 30 TABLET | Refills: 11 | Status: SHIPPED | OUTPATIENT
Start: 2024-05-31

## 2024-05-31 RX ORDER — ATORVASTATIN CALCIUM 80 MG/1
80 TABLET, FILM COATED ORAL
Qty: 30 TABLET | Refills: 11 | Status: SHIPPED | OUTPATIENT
Start: 2024-05-31

## 2024-05-31 RX ORDER — DULAGLUTIDE 0.75 MG/.5ML
0.75 INJECTION, SOLUTION SUBCUTANEOUS WEEKLY
Qty: 4 ADJUSTABLE DOSE PRE-FILLED PEN SYRINGE | Refills: 1 | Status: SHIPPED | OUTPATIENT
Start: 2024-05-31

## 2024-05-31 RX ORDER — GLYBURIDE 5 MG/1
20 TABLET ORAL
Qty: 120 TABLET | Refills: 1 | Status: SHIPPED | OUTPATIENT
Start: 2024-05-31

## 2024-07-02 NOTE — PATIENT INSTRUCTIONS
instructions adapted under license by [employer]. If you have questions about a medical condition or this instruction, always ask your healthcare professional. Healthwise, [facility] disclaims any warranty or liability for your use of this information.       Patient Education        High Cholesterol: Care Instructions  Overview     Cholesterol is a type of fat in your blood. It is needed for many body functions, such as making new cells. Cholesterol is made by your body. It also comes from food you eat. High cholesterol means that you have too much of the fat in your blood. This raises your risk of a heart attack and stroke.  LDL and HDL are part of your total cholesterol. LDL is the \"bad\" cholesterol. High LDL can raise your risk for coronary artery disease, heart attack, and stroke. HDL is the \"good\" cholesterol. It helps clear bad cholesterol from the body. High HDL is linked with a lower risk of coronary artery disease, heart attack, and stroke.  Your cholesterol levels help your doctor find out your risk for having a heart attack or stroke. You and your doctor can talk about whether you need to lower your risk and what treatment is best for you.  Treatment options include a heart-healthy lifestyle and medicine. Both options can help lower your cholesterol and your risk. The way you choose to lower your risk will depend on how high your risk is for heart attack and stroke. It will also depend on how you feel about taking medicines.  Follow-up care is a key part of your treatment and safety. Be sure to make and go to all appointments, and call your doctor if you are having problems. It's also a good idea to know your test results and keep a list of the medicines you take.  How can you care for yourself at home?  Eat heart-healthy foods.  Eat fruits, vegetables, whole grains, beans, and other high-fiber foods.  Eat lean proteins, such as seafood, lean meats, beans, nuts, and soy products.  Eat healthy fats,

## 2024-07-02 NOTE — PROGRESS NOTES
Chief Complaint   Patient presents with    Hypertension    Diabetes    Cholesterol Problem      \"Have you been to the ER, urgent care clinic since your last visit?  Hospitalized since your last visit?\"    NO    “Have you seen or consulted any other health care providers outside of [facility] since your last visit?”    NO      Annual eye exam: 01/04/2021 - Overdue - (no-showed 02/04/2022 appointment) confirmed with [facility]  Pneumococcal vaccine: 03/16/2019  Flu vaccine: 10/12/2022  Patient instructed to remove shoes: due 10/31/2024         Click Here for Release of Records Request

## 2024-07-03 ENCOUNTER — OFFICE VISIT (OUTPATIENT)
Facility: CLINIC | Age: 44
End: 2024-07-03
Payer: COMMERCIAL

## 2024-07-03 VITALS
HEART RATE: 75 BPM | TEMPERATURE: 98 F | SYSTOLIC BLOOD PRESSURE: 120 MMHG | BODY MASS INDEX: 29.33 KG/M2 | OXYGEN SATURATION: 98 % | WEIGHT: 198 LBS | DIASTOLIC BLOOD PRESSURE: 84 MMHG | RESPIRATION RATE: 18 BRPM | HEIGHT: 69 IN

## 2024-07-03 DIAGNOSIS — E78.2 MIXED HYPERLIPIDEMIA: ICD-10-CM

## 2024-07-03 DIAGNOSIS — Z91.199 MEDICALLY NONCOMPLIANT: ICD-10-CM

## 2024-07-03 DIAGNOSIS — E11.29 TYPE 2 DIABETES MELLITUS WITH MICROALBUMINURIA, WITHOUT LONG-TERM CURRENT USE OF INSULIN (HCC): Primary | ICD-10-CM

## 2024-07-03 DIAGNOSIS — I10 ESSENTIAL (PRIMARY) HYPERTENSION: ICD-10-CM

## 2024-07-03 DIAGNOSIS — R80.9 TYPE 2 DIABETES MELLITUS WITH MICROALBUMINURIA, WITHOUT LONG-TERM CURRENT USE OF INSULIN (HCC): Primary | ICD-10-CM

## 2024-07-03 PROCEDURE — 3052F HG A1C>EQUAL 8.0%<EQUAL 9.0%: CPT | Performed by: FAMILY MEDICINE

## 2024-07-03 PROCEDURE — 99214 OFFICE O/P EST MOD 30 MIN: CPT | Performed by: FAMILY MEDICINE

## 2024-07-03 PROCEDURE — 3074F SYST BP LT 130 MM HG: CPT | Performed by: FAMILY MEDICINE

## 2024-07-03 PROCEDURE — 3079F DIAST BP 80-89 MM HG: CPT | Performed by: FAMILY MEDICINE

## 2024-07-03 RX ORDER — DULAGLUTIDE 0.75 MG/.5ML
0.75 INJECTION, SOLUTION SUBCUTANEOUS WEEKLY
Qty: 4 ADJUSTABLE DOSE PRE-FILLED PEN SYRINGE | Refills: 1 | Status: SHIPPED | OUTPATIENT
Start: 2024-07-03

## 2024-07-03 NOTE — PROGRESS NOTES
Chief Complaint   Patient presents with    Hypertension    Diabetes    Cholesterol Problem     SUBJECTIVE    Diabetes -  Patient presents for follow-up of diabetes.  He was supposed to get labs but he did not.  He also never started trulicity because his pharmacy did not have it.      Says his father had several diabetic complications to include dialysis and amputations.     Hyperlipidemia -   Currently reports that he is taking statin.  No side effects reported.    OBJECTIVE  Blood pressure 120/84, pulse 75, temperature 98 °F (36.7 °C), temperature source Temporal, resp. rate 18, height 1.753 m (5' 9\"), weight 89.8 kg (198 lb), SpO2 98 %.    General:  Alert, cooperative, well appearing, in no apparent distress.  Chest: clear, no w/r/r.  Heart:  normal S1S2, RRR, no murmurs.     Latest Reference Range & Units 03/04/24 12:50   Sodium 136 - 145 mmol/L 141   Potassium 3.5 - 5.5 mmol/L 4.6   Chloride 100 - 111 mmol/L 107   CO2 21 - 32 mmol/L 30   BUN,BUNPL 7.0 - 18 MG/DL 12   Creatinine 0.6 - 1.3 MG/DL 1.27   Bun/Cre Ratio 12 - 20   9 (L)   Anion Gap 3.0 - 18 mmol/L 4   Est, Glom Filt Rate >60 ml/min/1.73m2 >60   Glucose, Random 74 - 99 mg/dL 81   CALCIUM, SERUM, 437817 8.5 - 10.1 MG/DL 10.3 (H)   ALBUMIN/GLOBULIN RATIO 0.8 - 1.7   1.2   Total Protein 6.4 - 8.2 g/dL 7.8   Albumin 3.4 - 5.0 g/dL 4.3   Globulin 2.0 - 4.0 g/dL 3.5   Alk Phos 45 - 117 U/L 83   ALT 16 - 61 U/L 62 (H)   AST 10 - 38 U/L 22   BILIRUBIN TOTAL 0.2 - 1.0 MG/DL 1.1 (H)   Hemoglobin A1C 4.2 - 5.6 % 8.0 (H)   eAG (mg/dL) mg/dL 183   (L): Data is abnormally low  (H): Data is abnormally high    ASSESSMENT / PLAN   Diagnosis Orders   1. Type 2 diabetes mellitus with microalbuminuria, without long-term current use of insulin (HCC)        2. Essential (primary) hypertension        3. Mixed hyperlipidemia        4. Medically noncompliant          Uncontrolled type 2 diabetes mellitus with microalbuminuria (HCC)   - cont Jardiance and glyburide.   - restart

## 2024-07-05 ENCOUNTER — HOSPITAL ENCOUNTER (OUTPATIENT)
Facility: HOSPITAL | Age: 44
Discharge: HOME | End: 2024-07-05
Payer: COMMERCIAL

## 2024-07-05 DIAGNOSIS — I10 ESSENTIAL (PRIMARY) HYPERTENSION: ICD-10-CM

## 2024-07-05 DIAGNOSIS — R80.9 TYPE 2 DIABETES MELLITUS WITH MICROALBUMINURIA, WITHOUT LONG-TERM CURRENT USE OF INSULIN (HCC): ICD-10-CM

## 2024-07-05 DIAGNOSIS — E78.2 MIXED HYPERLIPIDEMIA: ICD-10-CM

## 2024-07-05 DIAGNOSIS — E11.29 TYPE 2 DIABETES MELLITUS WITH MICROALBUMINURIA, WITHOUT LONG-TERM CURRENT USE OF INSULIN (HCC): ICD-10-CM

## 2024-07-05 LAB
ALBUMIN SERPL-MCNC: 4.1 G/DL (ref 3.4–5)
ALBUMIN/GLOB SERPL: 1.2 (ref 0.8–1.7)
ALP SERPL-CCNC: 86 U/L (ref 45–117)
ALT SERPL-CCNC: 55 U/L (ref 16–61)
ANION GAP SERPL CALC-SCNC: 7 MMOL/L (ref 3–18)
AST SERPL-CCNC: 23 U/L (ref 10–38)
BASOPHILS # BLD: 0 K/UL (ref 0–0.1)
BASOPHILS NFR BLD: 0 % (ref 0–2)
BILIRUB SERPL-MCNC: 0.5 MG/DL (ref 0.2–1)
BUN SERPL-MCNC: 19 MG/DL (ref 7–18)
BUN/CREAT SERPL: 17 (ref 12–20)
CALCIUM SERPL-MCNC: 9.4 MG/DL (ref 8.5–10.1)
CHLORIDE SERPL-SCNC: 107 MMOL/L (ref 100–111)
CHOLEST SERPL-MCNC: 215 MG/DL
CO2 SERPL-SCNC: 24 MMOL/L (ref 21–32)
CREAT SERPL-MCNC: 1.15 MG/DL (ref 0.6–1.3)
CREAT UR-MCNC: 182 MG/DL (ref 30–125)
DIFFERENTIAL METHOD BLD: ABNORMAL
EOSINOPHIL # BLD: 0.2 K/UL (ref 0–0.4)
EOSINOPHIL NFR BLD: 2 % (ref 0–5)
ERYTHROCYTE [DISTWIDTH] IN BLOOD BY AUTOMATED COUNT: 14.4 % (ref 11.6–14.5)
EST. AVERAGE GLUCOSE BLD GHB EST-MCNC: 192 MG/DL
GLOBULIN SER CALC-MCNC: 3.4 G/DL (ref 2–4)
GLUCOSE SERPL-MCNC: 156 MG/DL (ref 74–99)
HBA1C MFR BLD: 8.3 % (ref 4.2–5.6)
HCT VFR BLD AUTO: 48.7 % (ref 36–48)
HDLC SERPL-MCNC: 37 MG/DL (ref 40–60)
HDLC SERPL: 5.8 (ref 0–5)
HGB BLD-MCNC: 15.9 G/DL (ref 13–16)
IMM GRANULOCYTES # BLD AUTO: 0 K/UL (ref 0–0.04)
IMM GRANULOCYTES NFR BLD AUTO: 0 % (ref 0–0.5)
LDLC SERPL CALC-MCNC: 153 MG/DL (ref 0–100)
LIPID PANEL: ABNORMAL
LYMPHOCYTES # BLD: 2.5 K/UL (ref 0.9–3.6)
LYMPHOCYTES NFR BLD: 25 % (ref 21–52)
MCH RBC QN AUTO: 28.8 PG (ref 24–34)
MCHC RBC AUTO-ENTMCNC: 32.6 G/DL (ref 31–37)
MCV RBC AUTO: 88.2 FL (ref 78–100)
MICROALBUMIN UR-MCNC: 73.4 MG/DL (ref 0–3)
MICROALBUMIN/CREAT UR-RTO: 403 MG/G (ref 0–30)
MONOCYTES # BLD: 0.7 K/UL (ref 0.05–1.2)
MONOCYTES NFR BLD: 7 % (ref 3–10)
NEUTS SEG # BLD: 6.5 K/UL (ref 1.8–8)
NEUTS SEG NFR BLD: 65 % (ref 40–73)
NRBC # BLD: 0 K/UL (ref 0–0.01)
NRBC BLD-RTO: 0 PER 100 WBC
PLATELET # BLD AUTO: 273 K/UL (ref 135–420)
PMV BLD AUTO: 9.6 FL (ref 9.2–11.8)
POTASSIUM SERPL-SCNC: 4.6 MMOL/L (ref 3.5–5.5)
PROT SERPL-MCNC: 7.5 G/DL (ref 6.4–8.2)
RBC # BLD AUTO: 5.52 M/UL (ref 4.35–5.65)
SODIUM SERPL-SCNC: 138 MMOL/L (ref 136–145)
TRIGL SERPL-MCNC: 125 MG/DL
VLDLC SERPL CALC-MCNC: 25 MG/DL
WBC # BLD AUTO: 10 K/UL (ref 4.6–13.2)

## 2024-07-05 PROCEDURE — 36415 COLL VENOUS BLD VENIPUNCTURE: CPT

## 2024-07-05 PROCEDURE — 80053 COMPREHEN METABOLIC PANEL: CPT

## 2024-07-05 PROCEDURE — 80061 LIPID PANEL: CPT

## 2024-07-05 PROCEDURE — 82043 UR ALBUMIN QUANTITATIVE: CPT

## 2024-07-05 PROCEDURE — 82570 ASSAY OF URINE CREATININE: CPT

## 2024-07-05 PROCEDURE — 85025 COMPLETE CBC W/AUTO DIFF WBC: CPT

## 2024-07-05 PROCEDURE — 83036 HEMOGLOBIN GLYCOSYLATED A1C: CPT

## 2024-07-31 ENCOUNTER — OFFICE VISIT (OUTPATIENT)
Facility: CLINIC | Age: 44
End: 2024-07-31
Payer: COMMERCIAL

## 2024-07-31 VITALS
SYSTOLIC BLOOD PRESSURE: 128 MMHG | DIASTOLIC BLOOD PRESSURE: 82 MMHG | HEIGHT: 69 IN | HEART RATE: 73 BPM | RESPIRATION RATE: 18 BRPM | WEIGHT: 212 LBS | OXYGEN SATURATION: 99 % | BODY MASS INDEX: 31.4 KG/M2 | TEMPERATURE: 98.1 F

## 2024-07-31 DIAGNOSIS — E11.29 TYPE 2 DIABETES MELLITUS WITH MICROALBUMINURIA, WITHOUT LONG-TERM CURRENT USE OF INSULIN (HCC): Primary | ICD-10-CM

## 2024-07-31 DIAGNOSIS — R80.9 TYPE 2 DIABETES MELLITUS WITH MICROALBUMINURIA, WITHOUT LONG-TERM CURRENT USE OF INSULIN (HCC): Primary | ICD-10-CM

## 2024-07-31 DIAGNOSIS — Z91.199 MEDICALLY NONCOMPLIANT: ICD-10-CM

## 2024-07-31 DIAGNOSIS — I10 ESSENTIAL (PRIMARY) HYPERTENSION: ICD-10-CM

## 2024-07-31 DIAGNOSIS — E78.2 MIXED HYPERLIPIDEMIA: ICD-10-CM

## 2024-07-31 PROCEDURE — 3079F DIAST BP 80-89 MM HG: CPT | Performed by: FAMILY MEDICINE

## 2024-07-31 PROCEDURE — 3052F HG A1C>EQUAL 8.0%<EQUAL 9.0%: CPT | Performed by: FAMILY MEDICINE

## 2024-07-31 PROCEDURE — 99214 OFFICE O/P EST MOD 30 MIN: CPT | Performed by: FAMILY MEDICINE

## 2024-07-31 PROCEDURE — 3074F SYST BP LT 130 MM HG: CPT | Performed by: FAMILY MEDICINE

## 2024-07-31 NOTE — PROGRESS NOTES
Chief Complaint   Patient presents with    Results    Diabetes      \"Have you been to the ER, urgent care clinic since your last visit?  Hospitalized since your last visit?\"    NO    “Have you seen or consulted any other health care providers outside of [facility] since your last visit?”    NO    Annual eye exam: 01/04/2021 - Overdue - (no-showed 02/04/2022 appointment) confirmed with [facility]  Pneumococcal vaccine: 03/16/2019  Flu vaccine: 10/12/2022  Patient instructed to remove shoes: Yes      No updated immunization noted on [place] Immunization Registry as of 07/30/2024        Click Here for Release of Records Request  
The patient is to call if condition worsens or fails to improve.     Return in about 3 months (around 10/31/2024) for routine care, non-fasting labs to be completed 3-5 days prior.

## 2024-07-31 NOTE — PATIENT INSTRUCTIONS
Please call [facility] to schedule your overdue diabetic eye exam at [phone]    Patient Education        A Healthy Lifestyle: Care Instructions  A healthy lifestyle can help you feel good, have more energy, and stay at a weight that's healthy for you. You can share a healthy lifestyle with your friends and family. And you can do it on your own.    Eat meals with your friends or family. You could try cooking together.   Plan activities with other people. Go for a walk with a friend, try a free online fitness class, or join a sports league.     Eat a variety of healthy foods. These include fruits, vegetables, whole grains, low-fat dairy, and lean protein.   Choose healthy portions of food. You can use the Nutrition Facts label on food packages as a guide.     Eat more fruits and vegetables. You could add vegetables to sandwiches or add fruit to cereal.   Drink water when you are thirsty. Limit soda, juice, and sports drinks.     Try to exercise most days. Aim for at least 2½ hours of exercise each week.   Keep moving. Work in the garden or take your dog on a walk. Use the stairs instead of the elevator.     If you use tobacco or nicotine, try to quit. Ask your doctor about programs and medicines to help you quit.   Limit alcohol. Men should have no more than 2 drinks a day. Women should have no more than 1. For some people, no alcohol is the best choice.   Follow-up care is a key part of your treatment and safety. Be sure to make and go to all appointments, and call your doctor if you are having problems. It's also a good idea to know your test results and keep a list of the medicines you take.  Where can you learn more?  Go to https://www.healthEcoark.net/patientEd and enter U807 to learn more about \"A Healthy Lifestyle: Care Instructions.\"  Current as of: August 6, 2023  Content Version: 14.1  © 6253-0969 Healthwise, Incorporated.   Care instructions adapted under license by [employer]. If you have

## 2024-11-01 NOTE — PROGRESS NOTES
Chief Complaint   Patient presents with    Diabetes      \"Have you been to the ER, urgent care clinic since your last visit?  Hospitalized since your last visit?\"    NO    “Have you seen or consulted any other health care providers outside our system since your last visit?”    NO      “Have you had a colorectal cancer screening such as a colonoscopy/FIT/Cologuard?    NO    No colonoscopy on file  No cologuard on file  No FIT/FOBT on file   No flexible sigmoidoscopy on file     “Have you had a diabetic eye exam?”    NO     Date of last diabetic eye exam: 1/4/2021     Annual eye exam: 01/04/2021 - Overdue - (no-showed 02/04/2022 appointment) confirmed with [facility] [phone] (information was also given at previous appointment on July 31,2024) Scheduled with Dr. Villarreal 11/27/2024 at 10:00 am   Pneumococcal vaccine: 03/16/2019  Flu vaccine: 10/31/2023  Patient instructed to remove shoes: due 07/31/2025    Declined annual influenza vaccine for 2024    No updated immunization noted on [place] Immunization Registry as of 11/01/2024

## 2024-11-04 ENCOUNTER — HOSPITAL ENCOUNTER (OUTPATIENT)
Facility: HOSPITAL | Age: 44
Discharge: HOME | End: 2024-11-07
Payer: COMMERCIAL

## 2024-11-04 DIAGNOSIS — R80.9 TYPE 2 DIABETES MELLITUS WITH MICROALBUMINURIA, WITHOUT LONG-TERM CURRENT USE OF INSULIN (HCC): ICD-10-CM

## 2024-11-04 DIAGNOSIS — E11.29 TYPE 2 DIABETES MELLITUS WITH MICROALBUMINURIA, WITHOUT LONG-TERM CURRENT USE OF INSULIN (HCC): ICD-10-CM

## 2024-11-04 LAB
EST. AVERAGE GLUCOSE BLD GHB EST-MCNC: 194 MG/DL
HBA1C MFR BLD: 8.4 % (ref 4.2–5.6)

## 2024-11-04 PROCEDURE — 83036 HEMOGLOBIN GLYCOSYLATED A1C: CPT

## 2024-11-04 PROCEDURE — 36415 COLL VENOUS BLD VENIPUNCTURE: CPT

## 2024-11-06 ENCOUNTER — OFFICE VISIT (OUTPATIENT)
Facility: CLINIC | Age: 44
End: 2024-11-06
Payer: COMMERCIAL

## 2024-11-06 VITALS
WEIGHT: 210 LBS | TEMPERATURE: 98.4 F | BODY MASS INDEX: 31.1 KG/M2 | RESPIRATION RATE: 18 BRPM | SYSTOLIC BLOOD PRESSURE: 134 MMHG | HEART RATE: 78 BPM | DIASTOLIC BLOOD PRESSURE: 80 MMHG | OXYGEN SATURATION: 97 % | HEIGHT: 69 IN

## 2024-11-06 DIAGNOSIS — R80.9 TYPE 2 DIABETES MELLITUS WITH MICROALBUMINURIA, WITHOUT LONG-TERM CURRENT USE OF INSULIN (HCC): Primary | ICD-10-CM

## 2024-11-06 DIAGNOSIS — I10 ESSENTIAL (PRIMARY) HYPERTENSION: ICD-10-CM

## 2024-11-06 DIAGNOSIS — Z91.199 MEDICALLY NONCOMPLIANT: ICD-10-CM

## 2024-11-06 DIAGNOSIS — E78.2 MIXED HYPERLIPIDEMIA: ICD-10-CM

## 2024-11-06 DIAGNOSIS — E11.29 TYPE 2 DIABETES MELLITUS WITH MICROALBUMINURIA, WITHOUT LONG-TERM CURRENT USE OF INSULIN (HCC): Primary | ICD-10-CM

## 2024-11-06 DIAGNOSIS — Z12.11 COLON CANCER SCREENING: ICD-10-CM

## 2024-11-06 PROCEDURE — 3079F DIAST BP 80-89 MM HG: CPT | Performed by: FAMILY MEDICINE

## 2024-11-06 PROCEDURE — 99214 OFFICE O/P EST MOD 30 MIN: CPT | Performed by: FAMILY MEDICINE

## 2024-11-06 PROCEDURE — 3075F SYST BP GE 130 - 139MM HG: CPT | Performed by: FAMILY MEDICINE

## 2024-11-06 PROCEDURE — 3052F HG A1C>EQUAL 8.0%<EQUAL 9.0%: CPT | Performed by: FAMILY MEDICINE

## 2024-11-06 NOTE — PATIENT INSTRUCTIONS

## 2024-11-06 NOTE — PROGRESS NOTES
Chief Complaint   Patient presents with    Diabetes     SUBJECTIVE    Diabetes -  Patient presents for follow-up of diabetes. Will not take ozmepic or trulicity due to side effects.  No side effects with jardiance or glyburide.  Says that h takes those 3-4 days per week.  Sometimes he goes to work and skips them because he simply doesn't feel like taking them.    Says his father had several diabetic complications to include dialysis and amputations.     Hyperlipidemia -   Currently reports that he is taking statin 3-4 days per weekk.  No side effects reported.    OBJECTIVE  Blood pressure 134/80, pulse 78, temperature 98.4 °F (36.9 °C), temperature source Tympanic, resp. rate 18, height 1.753 m (5' 9\"), weight 95.3 kg (210 lb), SpO2 97%.    General:  Alert, cooperative, well appearing, in no apparent distress.  Chest: clear, no w/r/r.  Heart:  normal S1S2, RRR, no murmurs.     Latest Reference Range & Units 11/04/24 10:38   Hemoglobin A1C 4.2 - 5.6 % 8.4 (H)   eAG (mg/dL) mg/dL 194   (H): Data is abnormally high    ASSESSMENT / PLAN   Diagnosis Orders   1. Type 2 diabetes mellitus with microalbuminuria, without long-term current use of insulin (HCC)  Hemoglobin A1C      2. Essential (primary) hypertension        3. Mixed hyperlipidemia        4. Medically noncompliant        5. Colon cancer screening  Cologuard (Fecal DNA Colorectal Cancer Screening)        Uncontrolled type 2 diabetes mellitus with microalbuminuria (HCC)   - cont Jardiance and glyburide, encourage better compliance.  - Work on diet & exercise   - cont losartan 50mg daily.  -overdue for diabetic eye exam.    HTN   -cont losartan 50mg daily.    Mixed hyperlipidemia  - Poorly controlled due to noncompliance in the past  - encourage daily Lipitor 80mg nightly.    Medically noncompliant   - he is counseled on med compliance and consistency     Cologuard ordered for [facility].     All chart history elements were reviewed by me at the time of the visit even

## 2024-11-22 ENCOUNTER — TELEPHONE (OUTPATIENT)
Facility: CLINIC | Age: 44
End: 2024-11-22

## 2024-11-22 RX ORDER — BLOOD-GLUCOSE METER
1 KIT MISCELLANEOUS DAILY
Qty: 1 KIT | Refills: 0 | Status: SHIPPED | OUTPATIENT
Start: 2024-11-22

## 2024-11-22 RX ORDER — LANCETS 30 GAUGE
1 EACH MISCELLANEOUS DAILY
Qty: 200 EACH | Refills: 3 | Status: SHIPPED | OUTPATIENT
Start: 2024-11-22

## 2024-11-22 RX ORDER — GLUCOSAMINE HCL/CHONDROITIN SU 500-400 MG
CAPSULE ORAL
Qty: 200 STRIP | Refills: 3 | Status: SHIPPED | OUTPATIENT
Start: 2024-11-22

## 2024-11-22 NOTE — TELEPHONE ENCOUNTER
Pt's mom is requesting a glucose meter and supplies. She states that he never received the meter that was order last year and pt is not testing like he is supposed to. Please send the order to [facility]. Please advise.

## 2024-11-22 NOTE — TELEPHONE ENCOUNTER
Jelani Villavicencio mom has been notified that her son diabetic supplies have been e-scribed to [facility].

## 2024-12-05 NOTE — PROGRESS NOTES
Chief Complaint   Patient presents with    Rectal Bleeding     Concerns of bloody stools      \"Have you been to the ER, urgent care clinic since your last visit?  Hospitalized since your last visit?\"    NO    “Have you seen or consulted any other health care providers outside our system since your last visit?”    NO      “Have you had a colorectal cancer screening such as a colonoscopy/FIT/Cologuard?    NO - colo guard order placed 11/06/2024    No colonoscopy on file  No cologuard on file  No FIT/FOBT on file   No flexible sigmoidoscopy on file     “Have you had a diabetic eye exam?”    NO     Date of last diabetic eye exam: 1/4/2021      Annual eye exam: December of 2024 -  Pneumococcal vaccine: 03/06/2019  Flu vaccine: 10/31/2023  Patient instructed to remove shoes : due 07/31/2025    Declined annual influenza vaccine for [id number] at visit on 11/06/2024

## 2024-12-09 ENCOUNTER — OFFICE VISIT (OUTPATIENT)
Facility: CLINIC | Age: 44
End: 2024-12-09
Payer: COMMERCIAL

## 2024-12-09 VITALS
SYSTOLIC BLOOD PRESSURE: 130 MMHG | HEIGHT: 69 IN | TEMPERATURE: 98 F | WEIGHT: 206 LBS | OXYGEN SATURATION: 98 % | BODY MASS INDEX: 30.51 KG/M2 | RESPIRATION RATE: 18 BRPM | DIASTOLIC BLOOD PRESSURE: 82 MMHG | HEART RATE: 75 BPM

## 2024-12-09 DIAGNOSIS — Z91.199 MEDICALLY NONCOMPLIANT: ICD-10-CM

## 2024-12-09 DIAGNOSIS — R80.9 TYPE 2 DIABETES MELLITUS WITH MICROALBUMINURIA, WITHOUT LONG-TERM CURRENT USE OF INSULIN (HCC): ICD-10-CM

## 2024-12-09 DIAGNOSIS — K62.5 BRBPR (BRIGHT RED BLOOD PER RECTUM): Primary | ICD-10-CM

## 2024-12-09 DIAGNOSIS — I10 ESSENTIAL (PRIMARY) HYPERTENSION: ICD-10-CM

## 2024-12-09 DIAGNOSIS — E78.2 MIXED HYPERLIPIDEMIA: ICD-10-CM

## 2024-12-09 DIAGNOSIS — E11.29 TYPE 2 DIABETES MELLITUS WITH MICROALBUMINURIA, WITHOUT LONG-TERM CURRENT USE OF INSULIN (HCC): ICD-10-CM

## 2024-12-09 PROCEDURE — 3075F SYST BP GE 130 - 139MM HG: CPT | Performed by: FAMILY MEDICINE

## 2024-12-09 PROCEDURE — 3052F HG A1C>EQUAL 8.0%<EQUAL 9.0%: CPT | Performed by: FAMILY MEDICINE

## 2024-12-09 PROCEDURE — 99214 OFFICE O/P EST MOD 30 MIN: CPT | Performed by: FAMILY MEDICINE

## 2024-12-09 PROCEDURE — 3079F DIAST BP 80-89 MM HG: CPT | Performed by: FAMILY MEDICINE

## 2024-12-09 RX ORDER — ATORVASTATIN CALCIUM 80 MG/1
80 TABLET, FILM COATED ORAL
Qty: 30 TABLET | Refills: 3 | Status: SHIPPED | OUTPATIENT
Start: 2024-12-09

## 2024-12-09 RX ORDER — LOSARTAN POTASSIUM 50 MG/1
50 TABLET ORAL DAILY
Qty: 30 TABLET | Refills: 3 | Status: SHIPPED | OUTPATIENT
Start: 2024-12-09

## 2024-12-09 RX ORDER — GLYBURIDE 5 MG/1
20 TABLET ORAL
Qty: 120 TABLET | Refills: 3 | Status: SHIPPED | OUTPATIENT
Start: 2024-12-09

## 2024-12-09 NOTE — PROGRESS NOTES
SUBJECTIVE  Chief Complaint   Patient presents with    Rectal Bleeding     Concerns of bloody stools     Patient presents for BRBPR episode last week.  Had this in mid-2023 and never went to GI for colo.   He has no pain.  He said it lasted one day.  He has no melena.  No dizziness, lightheadedness, or fatigue.  No dyspnea.  No abd pains.   No rectal pain but has felt a painless bump in anus.      OBJECTIVE    Blood pressure 130/82, pulse 75, temperature 98 °F (36.7 °C), temperature source Temporal, resp. rate 18, height 1.753 m (5' 9\"), weight 93.4 kg (206 lb), SpO2 98%.  General:  Alert, cooperative, well appearing, in no apparent distress.  Lungs:  Inspiratory and expiratory efforts are full and unlabored.   Abd: soft, nontender.  Rectal: patient declines.  Skin:  No bruising.    ASSESSMENT / PLAN   Diagnosis Orders   1. BRBPR (bright red blood per rectum)  External Referral To Gastroenterology      2. Type 2 diabetes mellitus with microalbuminuria, without long-term current use of insulin (HCC)  empagliflozin (JARDIANCE) 25 MG tablet    glyBURIDE (DIABETA) 5 MG tablet      3. Essential (primary) hypertension  losartan (COZAAR) 50 MG tablet      4. Mixed hyperlipidemia  atorvastatin (LIPITOR) 80 MG tablet      5. Medically noncompliant          BRBPR   -  referred in 2023.  I hope he follows through.  I explained my concern that he is 44 and has had BRBPR and that cancer is always something we have to rule out.   Throw Cologaurd away since he has blood in stools.  It is not a valid test in this scenario.     Uncontrolled type 2 diabetes mellitus with microalbuminuria (HCC)   - cont Jardiance and glyburide, encourage better compliance.  - Work on diet & exercise   - cont losartan 50mg daily.  - has had diabetic eye exam.    HTN   -cont losartan 50mg daily.    Mixed hyperlipidemia  - Poorly controlled due to noncompliance in the past  - encourage daily Lipitor 80mg nightly.    Medically noncompliant   - he is

## 2024-12-09 NOTE — PATIENT INSTRUCTIONS

## 2024-12-30 DIAGNOSIS — E11.29 TYPE 2 DIABETES MELLITUS WITH MICROALBUMINURIA, WITHOUT LONG-TERM CURRENT USE OF INSULIN (HCC): ICD-10-CM

## 2024-12-30 DIAGNOSIS — R80.9 TYPE 2 DIABETES MELLITUS WITH MICROALBUMINURIA, WITHOUT LONG-TERM CURRENT USE OF INSULIN (HCC): ICD-10-CM

## 2024-12-31 RX ORDER — GLYBURIDE 5 MG/1
20 TABLET ORAL
Qty: 120 TABLET | Refills: 1 | Status: SHIPPED | OUTPATIENT
Start: 2024-12-31

## 2025-03-04 ENCOUNTER — OFFICE VISIT (OUTPATIENT)
Facility: CLINIC | Age: 45
End: 2025-03-04
Payer: COMMERCIAL

## 2025-03-04 VITALS
DIASTOLIC BLOOD PRESSURE: 82 MMHG | HEART RATE: 108 BPM | HEIGHT: 69 IN | OXYGEN SATURATION: 98 % | BODY MASS INDEX: 29.92 KG/M2 | SYSTOLIC BLOOD PRESSURE: 134 MMHG | WEIGHT: 202 LBS | RESPIRATION RATE: 18 BRPM | TEMPERATURE: 98 F

## 2025-03-04 DIAGNOSIS — K62.5 BRBPR (BRIGHT RED BLOOD PER RECTUM): ICD-10-CM

## 2025-03-04 DIAGNOSIS — E78.2 MIXED HYPERLIPIDEMIA: ICD-10-CM

## 2025-03-04 DIAGNOSIS — Z91.199 MEDICALLY NONCOMPLIANT: ICD-10-CM

## 2025-03-04 DIAGNOSIS — I10 ESSENTIAL (PRIMARY) HYPERTENSION: ICD-10-CM

## 2025-03-04 DIAGNOSIS — E11.29 TYPE 2 DIABETES MELLITUS WITH MICROALBUMINURIA, WITHOUT LONG-TERM CURRENT USE OF INSULIN (HCC): Primary | ICD-10-CM

## 2025-03-04 DIAGNOSIS — R80.9 TYPE 2 DIABETES MELLITUS WITH MICROALBUMINURIA, WITHOUT LONG-TERM CURRENT USE OF INSULIN (HCC): Primary | ICD-10-CM

## 2025-03-04 PROCEDURE — 99214 OFFICE O/P EST MOD 30 MIN: CPT | Performed by: FAMILY MEDICINE

## 2025-03-04 PROCEDURE — 3075F SYST BP GE 130 - 139MM HG: CPT | Performed by: FAMILY MEDICINE

## 2025-03-04 PROCEDURE — 3079F DIAST BP 80-89 MM HG: CPT | Performed by: FAMILY MEDICINE

## 2025-03-04 SDOH — ECONOMIC STABILITY: FOOD INSECURITY: WITHIN THE PAST 12 MONTHS, YOU WORRIED THAT YOUR FOOD WOULD RUN OUT BEFORE YOU GOT MONEY TO BUY MORE.: NEVER TRUE

## 2025-03-04 SDOH — ECONOMIC STABILITY: FOOD INSECURITY: WITHIN THE PAST 12 MONTHS, THE FOOD YOU BOUGHT JUST DIDN'T LAST AND YOU DIDN'T HAVE MONEY TO GET MORE.: NEVER TRUE

## 2025-03-04 ASSESSMENT — ANXIETY QUESTIONNAIRES
4. TROUBLE RELAXING: NOT AT ALL
GAD7 TOTAL SCORE: 0
5. BEING SO RESTLESS THAT IT IS HARD TO SIT STILL: NOT AT ALL
7. FEELING AFRAID AS IF SOMETHING AWFUL MIGHT HAPPEN: NOT AT ALL
1. FEELING NERVOUS, ANXIOUS, OR ON EDGE: NOT AT ALL
IF YOU CHECKED OFF ANY PROBLEMS ON THIS QUESTIONNAIRE, HOW DIFFICULT HAVE THESE PROBLEMS MADE IT FOR YOU TO DO YOUR WORK, TAKE CARE OF THINGS AT HOME, OR GET ALONG WITH OTHER PEOPLE: NOT DIFFICULT AT ALL
2. NOT BEING ABLE TO STOP OR CONTROL WORRYING: NOT AT ALL
3. WORRYING TOO MUCH ABOUT DIFFERENT THINGS: NOT AT ALL
6. BECOMING EASILY ANNOYED OR IRRITABLE: NOT AT ALL

## 2025-03-04 ASSESSMENT — PATIENT HEALTH QUESTIONNAIRE - PHQ9
SUM OF ALL RESPONSES TO PHQ QUESTIONS 1-9: 0
2. FEELING DOWN, DEPRESSED OR HOPELESS: NOT AT ALL
SUM OF ALL RESPONSES TO PHQ QUESTIONS 1-9: 0
SUM OF ALL RESPONSES TO PHQ QUESTIONS 1-9: 0
1. LITTLE INTEREST OR PLEASURE IN DOING THINGS: NOT AT ALL
SUM OF ALL RESPONSES TO PHQ QUESTIONS 1-9: 0

## 2025-03-04 NOTE — PATIENT INSTRUCTIONS

## 2025-03-04 NOTE — PROGRESS NOTES
\"Have you been to the ER, urgent care clinic since your last visit?  Hospitalized since your last visit?\"    NO    “Have you seen or consulted any other health care providers outside our system since your last visit?”    NO      “Have you had a colorectal cancer screening such as a colonoscopy/FIT/Cologuard?    NO    No colonoscopy on file  No cologuard on file  No FIT/FOBT on file   No flexible sigmoidoscopy on file     “Have you had a diabetic eye exam?”    YES - Where: 11/2024 Nurse/CMA to request most recent records if not in the chart     Date of last diabetic eye exam: 1/4/2021     Annual eye exam: 11/2024 sent to  scanning   Pneumococcal vaccine: No  Flu vaccine: No  Patient instructed to remove shoes: due 07/31/2025    No update immunization noted on [place] Immunization Registry as of 03/03/2025       
nightly.  - labs today.    Medically noncompliant   - he is counseled on med compliance and consistency     BRBPR  - having colonoscopy March 21st.    All chart history elements were reviewed by me at the time of the visit even though marked at time of note closure. Patient understands our medical plan. Patient has provided input and agrees with goals. Alternatives have been explained and offered.  All questions answered.  The patient is to call if condition worsens or fails to improve.     Return in about 4 months (around 7/4/2025) for routine care, fasting labs to be completed today 03/04/2025.

## 2025-04-30 DIAGNOSIS — I10 ESSENTIAL (PRIMARY) HYPERTENSION: ICD-10-CM

## 2025-04-30 DIAGNOSIS — E78.2 MIXED HYPERLIPIDEMIA: ICD-10-CM

## 2025-04-30 DIAGNOSIS — E11.29 TYPE 2 DIABETES MELLITUS WITH MICROALBUMINURIA, WITHOUT LONG-TERM CURRENT USE OF INSULIN (HCC): ICD-10-CM

## 2025-04-30 DIAGNOSIS — R80.9 TYPE 2 DIABETES MELLITUS WITH MICROALBUMINURIA, WITHOUT LONG-TERM CURRENT USE OF INSULIN (HCC): ICD-10-CM

## 2025-05-01 RX ORDER — EMPAGLIFLOZIN 25 MG/1
25 TABLET, FILM COATED ORAL DAILY
Qty: 30 TABLET | Refills: 2 | Status: SHIPPED | OUTPATIENT
Start: 2025-05-01

## 2025-05-01 RX ORDER — LOSARTAN POTASSIUM 50 MG/1
50 TABLET ORAL DAILY
Qty: 30 TABLET | Refills: 2 | Status: SHIPPED | OUTPATIENT
Start: 2025-05-01

## 2025-05-01 RX ORDER — ATORVASTATIN CALCIUM 80 MG/1
80 TABLET, FILM COATED ORAL
Qty: 30 TABLET | Refills: 11 | Status: SHIPPED | OUTPATIENT
Start: 2025-05-01

## 2025-05-08 ENCOUNTER — TELEPHONE (OUTPATIENT)
Facility: CLINIC | Age: 45
End: 2025-05-08

## 2025-05-08 NOTE — TELEPHONE ENCOUNTER
Incoming call received from Ms. Villavicencio called about her son prescriptions and spoken to the pharmacy and let them know they was sent and confirmed on May 1st.

## 2025-06-04 ENCOUNTER — TELEPHONE (OUTPATIENT)
Facility: CLINIC | Age: 45
End: 2025-06-04

## 2025-06-07 ENCOUNTER — RESULTS FOLLOW-UP (OUTPATIENT)
Facility: CLINIC | Age: 45
End: 2025-06-07

## 2025-06-07 ENCOUNTER — HOSPITAL ENCOUNTER (OUTPATIENT)
Age: 45
Discharge: HOME | End: 2025-06-07
Payer: COMMERCIAL

## 2025-06-07 DIAGNOSIS — I10 ESSENTIAL (PRIMARY) HYPERTENSION: ICD-10-CM

## 2025-06-07 DIAGNOSIS — E78.2 MIXED HYPERLIPIDEMIA: ICD-10-CM

## 2025-06-07 DIAGNOSIS — R80.9 TYPE 2 DIABETES MELLITUS WITH MICROALBUMINURIA, WITHOUT LONG-TERM CURRENT USE OF INSULIN (HCC): ICD-10-CM

## 2025-06-07 DIAGNOSIS — E11.29 TYPE 2 DIABETES MELLITUS WITH MICROALBUMINURIA, WITHOUT LONG-TERM CURRENT USE OF INSULIN (HCC): ICD-10-CM

## 2025-06-07 LAB
ALBUMIN SERPL-MCNC: 4.4 G/DL (ref 3.4–5)
ALBUMIN/GLOB SERPL: 1.4 (ref 0.8–1.7)
ALP SERPL-CCNC: 86 U/L (ref 45–117)
ALT SERPL-CCNC: 30 U/L (ref 10–50)
ANION GAP SERPL CALC-SCNC: 14 MMOL/L (ref 3–18)
AST SERPL-CCNC: 15 U/L (ref 10–38)
BASOPHILS # BLD: 0.04 K/UL (ref 0–0.1)
BASOPHILS NFR BLD: 0.4 % (ref 0–2)
BILIRUB SERPL-MCNC: 0.4 MG/DL (ref 0.2–1)
BUN SERPL-MCNC: 10 MG/DL (ref 6–23)
BUN/CREAT SERPL: 9 (ref 12–20)
CALCIUM SERPL-MCNC: 10.1 MG/DL (ref 8.5–10.1)
CHLORIDE SERPL-SCNC: 105 MMOL/L (ref 98–107)
CHOLEST SERPL-MCNC: 280 MG/DL
CO2 SERPL-SCNC: 25 MMOL/L (ref 21–32)
CREAT SERPL-MCNC: 1.16 MG/DL (ref 0.6–1.3)
CREAT UR-MCNC: 74.3 MG/DL (ref 30–125)
DIFFERENTIAL METHOD BLD: ABNORMAL
EOSINOPHIL # BLD: 0.23 K/UL (ref 0–0.4)
EOSINOPHIL NFR BLD: 2.1 % (ref 0–5)
ERYTHROCYTE [DISTWIDTH] IN BLOOD BY AUTOMATED COUNT: 14 % (ref 11.6–14.5)
EST. AVERAGE GLUCOSE BLD GHB EST-MCNC: 280 MG/DL
GLOBULIN SER CALC-MCNC: 3.1 G/DL (ref 2–4)
GLUCOSE SERPL-MCNC: 208 MG/DL (ref 74–108)
HBA1C MFR BLD: 11.4 % (ref 4.2–5.6)
HCT VFR BLD AUTO: 50.1 % (ref 36–48)
HDLC SERPL-MCNC: 36 MG/DL (ref 40–60)
HDLC SERPL: 7.7 (ref 0–5)
HGB BLD-MCNC: 16.3 G/DL (ref 13–16)
IMM GRANULOCYTES # BLD AUTO: 0.03 K/UL (ref 0–0.04)
IMM GRANULOCYTES NFR BLD AUTO: 0.3 % (ref 0–0.5)
LDLC SERPL CALC-MCNC: 183 MG/DL (ref 0–100)
LYMPHOCYTES # BLD: 3.21 K/UL (ref 0.9–3.6)
LYMPHOCYTES NFR BLD: 29.2 % (ref 21–52)
MCH RBC QN AUTO: 28.8 PG (ref 24–34)
MCHC RBC AUTO-ENTMCNC: 32.5 G/DL (ref 31–37)
MCV RBC AUTO: 88.5 FL (ref 78–100)
MICROALBUMIN UR-MCNC: <1.2 MG/DL (ref 0–3)
MICROALBUMIN/CREAT UR-RTO: NORMAL MG/G (ref 0–30)
MONOCYTES # BLD: 0.93 K/UL (ref 0.05–1.2)
MONOCYTES NFR BLD: 8.5 % (ref 3–10)
NEUTS SEG # BLD: 6.54 K/UL (ref 1.8–8)
NEUTS SEG NFR BLD: 59.5 % (ref 40–73)
NRBC # BLD: 0 K/UL (ref 0–0.01)
NRBC BLD-RTO: 0 PER 100 WBC
PLATELET # BLD AUTO: 250 K/UL (ref 135–420)
PMV BLD AUTO: 10.2 FL (ref 9.2–11.8)
POTASSIUM SERPL-SCNC: 4.4 MMOL/L (ref 3.5–5.5)
PROT SERPL-MCNC: 7.5 G/DL (ref 6.4–8.2)
RBC # BLD AUTO: 5.66 M/UL (ref 4.35–5.65)
SODIUM SERPL-SCNC: 144 MMOL/L (ref 136–145)
TRIGL SERPL-MCNC: 304 MG/DL (ref 0–150)
VLDLC SERPL CALC-MCNC: 61 MG/DL
WBC # BLD AUTO: 11 K/UL (ref 4.6–13.2)

## 2025-06-07 PROCEDURE — 82570 ASSAY OF URINE CREATININE: CPT

## 2025-06-07 PROCEDURE — 83036 HEMOGLOBIN GLYCOSYLATED A1C: CPT

## 2025-06-07 PROCEDURE — 80061 LIPID PANEL: CPT

## 2025-06-07 PROCEDURE — 80053 COMPREHEN METABOLIC PANEL: CPT

## 2025-06-07 PROCEDURE — 82043 UR ALBUMIN QUANTITATIVE: CPT

## 2025-06-07 PROCEDURE — 85025 COMPLETE CBC W/AUTO DIFF WBC: CPT

## 2025-07-07 NOTE — PATIENT INSTRUCTIONS
Patient Education        A Healthy Lifestyle: Care Instructions  A healthy lifestyle can help you feel good, have more energy, and stay at a weight that's healthy for you. You can share a healthy lifestyle with your friends and family. And you can do it on your own.    Eat meals with your friends or family. You could try cooking together.   Plan activities with other people. Go for a walk with a friend, try a free online fitness class, or join a sports league.     Eat a variety of healthy foods. These include fruits, vegetables, whole grains, low-fat dairy, and lean protein.   Choose healthy portions of food. You can use the Nutrition Facts label on food packages as a guide.     Eat more fruits and vegetables. You could add vegetables to sandwiches or add fruit to cereal.   Drink water when you are thirsty. Limit soda, juice, and sports drinks.     Try to exercise most days. Aim for at least 2½ hours of exercise each week.   Keep moving. Work in the garden or take your dog on a walk. Use the stairs instead of the elevator.     If you use tobacco or nicotine, try to quit. Ask your doctor about programs and medicines to help you quit.   Limit alcohol. Men should have no more than 2 drinks a day. Women should have no more than 1. For some people, no alcohol is the best choice.   Follow-up care is a key part of your treatment and safety. Be sure to make and go to all appointments, and call your doctor if you are having problems. It's also a good idea to know your test results and keep a list of the medicines you take.  Where can you learn more?  Go to https://www.health[employer].net/patientEd and enter U807 to learn more about \"A Healthy Lifestyle: Care Instructions.\"  Current as of: April 30, 2024  Content Version: 14.5  © 6811-0211 [facility] [employer].   Care instructions adapted under license by [employer]. If you have questions about a medical condition or this instruction, always ask your healthcare professional.

## 2025-07-07 NOTE — PROGRESS NOTES
Chief Complaint   Patient presents with    Results     Review of results     Hypertension    Diabetes      Have you been to the ER, urgent care clinic since your last visit?  Hospitalized since your last visit?   NO    Have you seen or consulted any other health care providers outside our system since your last visit?   YES, under the care of gastroenterology     Annual eye exam: 11/27/2025  Pneumococcal vaccine: 03/06/2019  Flu vaccine: 10/31/2023  Patient instructed to remove shoes: Yes

## 2025-07-08 ENCOUNTER — OFFICE VISIT (OUTPATIENT)
Facility: CLINIC | Age: 45
End: 2025-07-08
Payer: COMMERCIAL

## 2025-07-08 VITALS
DIASTOLIC BLOOD PRESSURE: 80 MMHG | TEMPERATURE: 98.9 F | OXYGEN SATURATION: 100 % | HEIGHT: 69 IN | HEART RATE: 98 BPM | RESPIRATION RATE: 18 BRPM | WEIGHT: 205 LBS | SYSTOLIC BLOOD PRESSURE: 126 MMHG | BODY MASS INDEX: 30.36 KG/M2

## 2025-07-08 DIAGNOSIS — I10 ESSENTIAL (PRIMARY) HYPERTENSION: ICD-10-CM

## 2025-07-08 DIAGNOSIS — Z91.199 MEDICALLY NONCOMPLIANT: ICD-10-CM

## 2025-07-08 DIAGNOSIS — R80.9 TYPE 2 DIABETES MELLITUS WITH MICROALBUMINURIA, WITHOUT LONG-TERM CURRENT USE OF INSULIN (HCC): Primary | ICD-10-CM

## 2025-07-08 DIAGNOSIS — E11.29 TYPE 2 DIABETES MELLITUS WITH MICROALBUMINURIA, WITHOUT LONG-TERM CURRENT USE OF INSULIN (HCC): Primary | ICD-10-CM

## 2025-07-08 DIAGNOSIS — E78.2 MIXED HYPERLIPIDEMIA: ICD-10-CM

## 2025-07-08 DIAGNOSIS — K63.5 POLYP OF COLON, UNSPECIFIED PART OF COLON, UNSPECIFIED TYPE: ICD-10-CM

## 2025-07-08 PROCEDURE — 3046F HEMOGLOBIN A1C LEVEL >9.0%: CPT | Performed by: FAMILY MEDICINE

## 2025-07-08 PROCEDURE — 3074F SYST BP LT 130 MM HG: CPT | Performed by: FAMILY MEDICINE

## 2025-07-08 PROCEDURE — 3079F DIAST BP 80-89 MM HG: CPT | Performed by: FAMILY MEDICINE

## 2025-07-08 PROCEDURE — 99214 OFFICE O/P EST MOD 30 MIN: CPT | Performed by: FAMILY MEDICINE

## 2025-07-08 RX ORDER — GLYBURIDE 5 MG/1
20 TABLET ORAL
Qty: 120 TABLET | Refills: 1 | Status: SHIPPED | OUTPATIENT
Start: 2025-07-08

## 2025-07-08 NOTE — PROGRESS NOTES
Chief Complaint   Patient presents with    Results     Review of results     Hypertension    Diabetes     SUBJECTIVE    Diabetes -  Patient presents for follow-up of diabetes.  Has had a dramatic increase in his A1c due to medication noncompliance with Jardiance he states.  Will not take ozmepic or trulicity due to side effects.  No side effects with jardiance or glyburide reported.  He has stretches where he takes his pills 3-4 days per week in the past.      Says his father had several diabetic complications to include dialysis and amputations.     Hyperlipidemia -   Currently reports that he is taking statin daily as opposed to 3-4 days per week however his labs indicate a very high LDL.  No side effects reported.    Had colonoscopy 3/2025 due to BRBPR and had several polyps.    OBJECTIVE  Blood pressure 126/80, pulse 98, temperature 98.9 °F (37.2 °C), temperature source Skin, resp. rate 18, height 1.753 m (5' 9\"), weight 93 kg (205 lb), SpO2 100%.    General:  Alert, cooperative, well appearing, in no apparent distress.  Chest: clear, no w/r/r.  Heart:  normal S1S2, RRR, no murmurs.  Feet:  Monofilament testing intact.  Plantar callusing.  No open lesions.  No erythema.  Vascularly intact.  No deformities.      Latest Reference Range & Units 06/07/25 08:39   Sodium 136 - 145 mmol/L 144   Potassium 3.5 - 5.5 mmol/L 4.4   Chloride 98 - 107 mmol/L 105   CARBON DIOXIDE 21 - 32 mmol/L 25   BUN,BUNPL 6 - 23 MG/DL 10   Creatinine 0.6 - 1.3 MG/DL 1.16   Bun/Cre 12 - 20   9 (L)   Anion Gap 3.0 - 18.0 mmol/L 14   Est, Glom Filt Rate >60 ml/min/1.73m2 79   Glucose 74 - 108 mg/dL 208 (H)   Calcium 8.5 - 10.1 MG/DL 10.1   Albumin/Globulin Ratio 0.8 - 1.7   1.4   Total Protein 6.4 - 8.2 g/dL 7.5   Chol/HDL Ratio 0 - 5.0   7.7 (H)   Cholesterol, Total MG/   HDL Cholesterol 40 - 60 MG/DL 36 (L)   LDL Cholesterol 0 - 100 MG/ (H)   Triglycerides 0 - 150 MG/ (H)   VLDL MG/DL 61   Albumin 3.4 - 5.0 g/dL 4.4

## 2025-08-12 DIAGNOSIS — I10 ESSENTIAL (PRIMARY) HYPERTENSION: ICD-10-CM

## 2025-08-16 RX ORDER — BLOOD-GLUCOSE METER
1 KIT MISCELLANEOUS DAILY
Qty: 1 KIT | Refills: 0 | Status: SHIPPED | OUTPATIENT
Start: 2025-08-16

## 2025-08-16 RX ORDER — LOSARTAN POTASSIUM 50 MG/1
50 TABLET ORAL DAILY
Qty: 30 TABLET | Refills: 0 | Status: SHIPPED | OUTPATIENT
Start: 2025-08-16

## 2025-08-16 RX ORDER — AVOBENZONE, HOMOSALATE, OCTISALATE, OCTOCRYLENE 30; 40; 45; 26 MG/ML; MG/ML; MG/ML; MG/ML
CREAM TOPICAL
Qty: 200 EACH | Refills: 3 | Status: SHIPPED | OUTPATIENT
Start: 2025-08-16

## 2025-08-16 RX ORDER — GLUCOSAMINE HCL/CHONDROITIN SU 500-400 MG
CAPSULE ORAL
Qty: 200 STRIP | Refills: 3 | Status: SHIPPED | OUTPATIENT
Start: 2025-08-16

## 2025-09-04 ENCOUNTER — HOSPITAL ENCOUNTER (OUTPATIENT)
Age: 45
Discharge: HOME | End: 2025-09-04
Payer: COMMERCIAL

## 2025-09-04 DIAGNOSIS — R80.9 TYPE 2 DIABETES MELLITUS WITH MICROALBUMINURIA, WITHOUT LONG-TERM CURRENT USE OF INSULIN (HCC): ICD-10-CM

## 2025-09-04 DIAGNOSIS — E11.29 TYPE 2 DIABETES MELLITUS WITH MICROALBUMINURIA, WITHOUT LONG-TERM CURRENT USE OF INSULIN (HCC): ICD-10-CM

## 2025-09-04 LAB
EST. AVERAGE GLUCOSE BLD GHB EST-MCNC: 222 MG/DL
HBA1C MFR BLD: 9.4 % (ref 4.2–5.6)

## 2025-09-04 PROCEDURE — 36415 COLL VENOUS BLD VENIPUNCTURE: CPT

## 2025-09-04 PROCEDURE — 83036 HEMOGLOBIN GLYCOSYLATED A1C: CPT
